# Patient Record
Sex: FEMALE | Race: WHITE | NOT HISPANIC OR LATINO | Employment: FULL TIME | URBAN - METROPOLITAN AREA
[De-identification: names, ages, dates, MRNs, and addresses within clinical notes are randomized per-mention and may not be internally consistent; named-entity substitution may affect disease eponyms.]

---

## 2017-01-02 ENCOUNTER — ANESTHESIA EVENT (OUTPATIENT)
Dept: SURGERY | Facility: HOSPITAL | Age: 52
End: 2017-01-02
Payer: MEDICAID

## 2017-01-03 ENCOUNTER — SURGERY (OUTPATIENT)
Age: 52
End: 2017-01-03

## 2017-01-03 ENCOUNTER — ANESTHESIA (OUTPATIENT)
Dept: SURGERY | Facility: HOSPITAL | Age: 52
End: 2017-01-03
Payer: MEDICAID

## 2017-01-03 PROCEDURE — 25000003 PHARM REV CODE 250

## 2017-01-03 PROCEDURE — 63600175 PHARM REV CODE 636 W HCPCS: Performed by: NURSE ANESTHETIST, CERTIFIED REGISTERED

## 2017-01-03 PROCEDURE — 63600175 PHARM REV CODE 636 W HCPCS

## 2017-01-03 PROCEDURE — 25000003 PHARM REV CODE 250: Performed by: ANESTHESIOLOGY

## 2017-01-03 PROCEDURE — D9220A PRA ANESTHESIA: Mod: ANES,,, | Performed by: ANESTHESIOLOGY

## 2017-01-03 PROCEDURE — D9220A PRA ANESTHESIA: Mod: CRNA,,, | Performed by: NURSE ANESTHETIST, CERTIFIED REGISTERED

## 2017-01-03 RX ORDER — PHENYLEPHRINE HYDROCHLORIDE 10 MG/ML
INJECTION INTRAVENOUS
Status: DISCONTINUED | OUTPATIENT
Start: 2017-01-03 | End: 2017-01-03

## 2017-01-03 RX ADMIN — MIDAZOLAM HYDROCHLORIDE 2 MG: 1 INJECTION, SOLUTION INTRAMUSCULAR; INTRAVENOUS at 02:01

## 2017-01-03 RX ADMIN — ONDANSETRON 4 MG: 2 INJECTION, SOLUTION INTRAMUSCULAR; INTRAVENOUS at 02:01

## 2017-01-03 RX ADMIN — FENTANYL CITRATE 50 MCG: 50 INJECTION INTRAMUSCULAR; INTRAVENOUS at 02:01

## 2017-01-03 RX ADMIN — IOHEXOL 50 ML: 300 INJECTION, SOLUTION INTRAVENOUS at 01:01

## 2017-01-03 RX ADMIN — LIDOCAINE HYDROCHLORIDE 50 ML: 40 SOLUTION TOPICAL at 01:01

## 2017-01-03 RX ADMIN — PHENYLEPHRINE HYDROCHLORIDE 100 MCG: 10 INJECTION INTRAVENOUS at 02:01

## 2017-01-03 RX ADMIN — PROPOFOL 170 MG: 10 INJECTION, EMULSION INTRAVENOUS at 02:01

## 2017-01-03 RX ADMIN — METOCLOPRAMIDE 10 MG: 5 INJECTION, SOLUTION INTRAMUSCULAR; INTRAVENOUS at 02:01

## 2017-01-03 RX ADMIN — LIDOCAINE HYDROCHLORIDE 100 MG: 20 INJECTION, SOLUTION INFILTRATION; PERINEURAL at 02:01

## 2017-01-03 RX ADMIN — SODIUM CHLORIDE, SODIUM LACTATE, POTASSIUM CHLORIDE, AND CALCIUM CHLORIDE: .6; .31; .03; .02 INJECTION, SOLUTION INTRAVENOUS at 02:01

## 2017-01-03 RX ADMIN — CIPROFLOXACIN 400 MG: 2 INJECTION, SOLUTION INTRAVENOUS at 02:01

## 2017-01-03 RX ADMIN — GLYCOPYRROLATE 0.2 MG: 0.2 INJECTION, SOLUTION INTRAMUSCULAR; INTRAVENOUS at 02:01

## 2017-01-03 NOTE — ANESTHESIA POSTPROCEDURE EVALUATION
"Anesthesia Post Evaluation    Patient: Lorraine Childers    Procedure(s) Performed: Procedure(s):  CYSTOSCOPY WITH HYDRODISTENSION AND BLADDER INSTILLATION  PYELOGRAM-RETROGRADE    Final Anesthesia Type: MAC  Patient location during evaluation: PACU  Patient participation: Yes- Able to Participate  Level of consciousness: awake and alert and oriented  Post-procedure vital signs: reviewed and stable  Pain management: adequate  Airway patency: patent  PONV status at discharge: No PONV  Anesthetic complications: no      Cardiovascular status: blood pressure returned to baseline and hemodynamically stable  Respiratory status: unassisted, spontaneous ventilation and room air  Hydration status: euvolemic  Follow-up not needed.        Visit Vitals    /73 (BP Location: Left arm, Patient Position: Lying, BP Method: Automatic)    Pulse 79    Temp 35.8 °C (96.4 °F) (Tympanic)    Resp 17    Ht 5' 7" (1.702 m)    Wt 91.6 kg (202 lb)    LMP 08/29/2012    SpO2 99%    Breastfeeding No    BMI 31.64 kg/m2       Pain/Leodan Score: Pain Assessment Performed: Yes (1/3/2017  2:49 PM)  Presence of Pain: non-verbal indicators absent (sedated) (1/3/2017  2:49 PM)  Pain Rating Prior to Med Admin: 0 (1/3/2017  2:49 PM)  Leodan Score: 4 (1/3/2017  2:49 PM)      "

## 2017-01-03 NOTE — TRANSFER OF CARE
"Anesthesia Transfer of Care Note    Patient: Lorraine Childers    Procedure(s) Performed: Procedure(s):  CYSTOSCOPY WITH HYDRODISTENSION AND BLADDER INSTILLATION  PYELOGRAM-RETROGRADE    Patient location: PACU    Anesthesia Type: general    Transport from OR: Transported from OR on room air with adequate spontaneous ventilation    Post pain: adequate analgesia    Post assessment: no apparent anesthetic complications and tolerated procedure well    Post vital signs: stable    Level of consciousness: sedated and responds to stimulation    Nausea/Vomiting: no nausea/vomiting    Complications: none          Last vitals:   Visit Vitals    /73 (BP Location: Left arm, Patient Position: Lying, BP Method: Automatic)    Pulse 79    Temp 35.8 °C (96.4 °F) (Tympanic)    Resp 17    Ht 5' 7" (1.702 m)    Wt 91.6 kg (202 lb)    LMP 08/29/2012    SpO2 99%    Breastfeeding No    BMI 31.64 kg/m2     "

## 2017-01-03 NOTE — ANESTHESIA PREPROCEDURE EVALUATION
01/03/2017  Lorraine Childers is a 51 y.o., female.    [facility] Anesthesia Evaluation    I have reviewed the Patient Summary Reports.     I have reviewed the Medications.     Review of Systems  Anesthesia Hx:  History of prior surgery of interest to airway management or planning:   Social:  Smoker    Cardiovascular:  Cardiovascular Normal     Renal/:  Renal/ Normal     Hepatic/GI:  Hepatic/GI Normal    Musculoskeletal:  Spine Disorders:    Endocrine:   Diabetes    Psych:   anxiety             Anesthesia Plan  Type of Anesthesia, risks & benefits discussed:  Anesthesia Type:  general  Patient's Preference:   Intra-op Monitoring Plan: standard ASA monitors  Intra-op Monitoring Plan Comments:   Post Op Pain Control Plan:   Post Op Pain Control Plan Comments:   Induction:   IV  Beta Blocker:  Patient is not currently on a Beta-Blocker (No further documentation required).       Informed Consent: Patient understands risks and agrees with Anesthesia plan.  Questions answered. Anesthesia consent signed with patient.  ASA Score: 2     Day of Surgery Review of History & Physical:    H&P update referred to the provider.         Ready For Surgery From Anesthesia Perspective.

## 2017-02-10 ENCOUNTER — OFFICE VISIT (OUTPATIENT)
Dept: FAMILY MEDICINE | Facility: CLINIC | Age: 52
End: 2017-02-10
Payer: MEDICAID

## 2017-02-10 VITALS
SYSTOLIC BLOOD PRESSURE: 118 MMHG | WEIGHT: 205.69 LBS | HEIGHT: 67 IN | TEMPERATURE: 98 F | HEART RATE: 78 BPM | OXYGEN SATURATION: 98 % | BODY MASS INDEX: 32.28 KG/M2 | DIASTOLIC BLOOD PRESSURE: 72 MMHG

## 2017-02-10 DIAGNOSIS — M25.512 ACUTE PAIN OF LEFT SHOULDER: Primary | ICD-10-CM

## 2017-02-10 DIAGNOSIS — M75.82 ROTATOR CUFF TENDONITIS, LEFT: ICD-10-CM

## 2017-02-10 PROCEDURE — 99214 OFFICE O/P EST MOD 30 MIN: CPT | Mod: S$PBB,,, | Performed by: NURSE PRACTITIONER

## 2017-02-10 PROCEDURE — 99999 PR PBB SHADOW E&M-EST. PATIENT-LVL III: CPT | Mod: PBBFAC,,, | Performed by: NURSE PRACTITIONER

## 2017-02-10 PROCEDURE — 99213 OFFICE O/P EST LOW 20 MIN: CPT | Mod: PBBFAC,PO | Performed by: NURSE PRACTITIONER

## 2017-02-10 RX ORDER — IBUPROFEN 800 MG/1
800 TABLET ORAL 3 TIMES DAILY PRN
Qty: 90 TABLET | Refills: 0 | Status: SHIPPED | OUTPATIENT
Start: 2017-02-10 | End: 2018-08-31

## 2017-02-10 RX ORDER — TIZANIDINE 4 MG/1
4 TABLET ORAL EVERY 6 HOURS PRN
Qty: 60 TABLET | Refills: 0 | Status: SHIPPED | OUTPATIENT
Start: 2017-02-10 | End: 2017-02-20

## 2017-02-10 NOTE — PROGRESS NOTES
This dictation has been generated using Dragon Dictation some phonetic errors may occur.     Lorraine Fonseca was seen today for shoulder pain.    Diagnoses and all orders for this visit:    Acute pain of left shoulder    Rotator cuff tendonitis, left    Other orders  -     ibuprofen (ADVIL,MOTRIN) 800 MG tablet; Take 1 tablet (800 mg total) by mouth 3 (three) times daily as needed for Pain.  -     tizanidine (ZANAFLEX) 4 MG tablet; Take 1 tablet (4 mg total) by mouth every 6 (six) hours as needed.      Left shoulder pain rotator cuff tendinitis treat with NSAID and muscle relaxer as above.  Consult considered steroids next.  Patient refused joint injection.    Return in about 2 weeks (around 2/24/2017).      ________________________________________________________________  ________________________________________________________________        Chief Complaint   Patient presents with    Shoulder Pain     History of present illness  This 51 y.o. presents today for complaint of left shoulder pain.  Patient notes onset of symptoms at the end of last year.  In November she had x-rays of her shoulder while she was being evaluated for the chest pain.  No abnormalities noted.  Reviewed those images.  Patient notes some progression of the shoulder pain since then.  She states she cannot raise the arm above the head.  She has pain with rotation.  She does have difficulty dressing.  Review of systems  No chest pain or shortness of breath  Patient denies a rash or bruising  No neck pain.  No cervical radiculopathy symptoms.  Past medical and social history reviewed.    Past Medical History   Diagnosis Date    Anxiety     Back problem     Diabetes mellitus type I      no meds since weight loss few years back    General anesthetics causing adverse effect in therapeutic use      sometimes hard to sedate--    Pneumonia     Thyroid disease      thyroid nodules       Past Surgical History   Procedure Laterality Date     "Hysterectomy       Partial abd     section  x2      and     Rhinoplasty tip         Family History   Problem Relation Age of Onset    Diabetes Mother     Heart disease Mother     Hyperlipidemia Mother     Hypertension Mother        Social History     Social History    Marital status: Single     Spouse name: N/A    Number of children: N/A    Years of education: N/A     Social History Main Topics    Smoking status: Current Every Day Smoker     Packs/day: 1.00     Types: Cigarettes    Smokeless tobacco: None    Alcohol use No    Drug use: No    Sexual activity: Not Asked     Other Topics Concern    None     Social History Narrative       Current Outpatient Prescriptions   Medication Sig Dispense Refill    escitalopram oxalate (LEXAPRO) 10 MG tablet Take 1 tablet (10 mg total) by mouth once daily. 30 tablet 11    lorazepam (ATIVAN) 0.5 MG tablet Take 0.5 mg by mouth every 4 (four) hours as needed for Anxiety.      methen-m.blue-s.phos-phsal-hyo 118-10-40.8-36 mg Cap Take 1 capsule by mouth 3 (three) times daily as needed (bladder pain). 20 capsule 11    oxycodone-acetaminophen (PERCOCET) 5-325 mg per tablet Take 1 tablet by mouth every 4 (four) hours as needed for Pain. 10 tablet 0    tramadol (ULTRAM) 50 mg tablet Take 50 mg by mouth every 6 (six) hours as needed for Pain.      ibuprofen (ADVIL,MOTRIN) 800 MG tablet Take 1 tablet (800 mg total) by mouth 3 (three) times daily as needed for Pain. 90 tablet 0    tizanidine (ZANAFLEX) 4 MG tablet Take 1 tablet (4 mg total) by mouth every 6 (six) hours as needed. 60 tablet 0     No current facility-administered medications for this visit.        Review of patient's allergies indicates:   Allergen Reactions    Vicodin [hydrocodone-acetaminophen] Other (See Comments)     "Makes me Hyper." Doesn't help the pain.    Percocet [oxycodone-acetaminophen] Nausea Only     Can take Percocet if she takes Phenergan or Zofran.       Physical " examination  Vitals Reviewed  Gen. Well-dressed well-nourished in mild distress due to left shoulder pain.  Skin warm dry and intact.  No rashes noted.  Chest.  Respirations are even unlabored.  Lungs are clear to auscultation.  Cardiac regular rate and rhythm.  No chest wall adenopathy noted.  Neuro. Awake alert oriented x4.  Normal judgment and cognition noted.  Extremities no clubbing cyanosis or edema noted.  Left shoulder decreased range of motion.  Patient has pain with elevation at 90°.  She is able to elevate the arm more in the vertical plane versus the horizontal plane.  She has pain with internal and external rotation and external rotation is limited.  She is able to get the back of the hand over the small of the back.  Patient states pain with doing so.  She has some palpable spasms of the deltoid supraspinatus and shoulder girdle muscles.    Call or return to clinic prn if these symptoms worsen or fail to improve as anticipated.

## 2017-02-10 NOTE — MR AVS SNAPSHOT
[facility]  [address]  [place] [address]  Phone: [phone]  Fax: [phone]                  Lorraine Childers   2/10/2017 1:20 PM   Office Visit    Description:  Female : 1965   Provider:  Jose Felix NP   Department:  [facility]           Reason for Visit     Shoulder Pain           Diagnoses this Visit        Comments    Acute pain of left shoulder    -  Primary     Rotator cuff tendonitis, left                To Do List           Goals (5 Years of Data)     None      Follow-Up and Disposition     Return in about 2 weeks (around 2017).       These Medications        Disp Refills Start End    ibuprofen (ADVIL,MOTRIN) 800 MG tablet 90 tablet 0 2/10/2017     Take 1 tablet (800 mg total) by mouth 3 (three) times daily as needed for Pain. - Oral    Pharmacy: [facility] [address] [place] [address] EXPY AT [facility] Ph #: [phone]       tizanidine (ZANAFLEX) 4 MG tablet 60 tablet 0 2/10/2017 2017    Take 1 tablet (4 mg total) by mouth every 6 (six) hours as needed. - Oral    Pharmacy: [facility] [address] [place] [address] EXP AT [facility] Ph #: [phone]         [facility] On Call     [facility] On Call Nurse Care Line -  Assistance  Registered nurses in the [facility] provide clinical advisement, health education, appointment booking, and other advisory services.  Call for this free service at [phone].             Medications           Message regarding Medications     Verify the changes and/or additions to your medication regime listed below are the same as discussed with your clinician today.  If any of these changes or additions are incorrect, please notify your healthcare provider.        START taking these NEW medications        Refills    ibuprofen (ADVIL,MOTRIN) 800 MG tablet 0    Sig: Take 1 tablet (800 mg total) by mouth 3 (three)  "times daily as needed for Pain.    Class: Normal    Route: Oral    tizanidine (ZANAFLEX) 4 MG tablet 0    Sig: Take 1 tablet (4 mg total) by mouth every 6 (six) hours as needed.    Class: Normal    Route: Oral           Verify that the below list of medications is an accurate representation of the medications you are currently taking.  If none reported, the list may be blank. If incorrect, please contact your healthcare provider. Carry this list with you in case of emergency.           Current Medications     escitalopram oxalate (LEXAPRO) 10 MG tablet Take 1 tablet (10 mg total) by mouth once daily.    lorazepam (ATIVAN) 0.5 MG tablet Take 0.5 mg by mouth every 4 (four) hours as needed for Anxiety.    methen-m.blue-s.phos-phsal-hyo 118-10-40.8-36 mg Cap Take 1 capsule by mouth 3 (three) times daily as needed (bladder pain).    oxycodone-acetaminophen (PERCOCET) 5-325 mg per tablet Take 1 tablet by mouth every 4 (four) hours as needed for Pain.    tramadol (ULTRAM) 50 mg tablet Take 50 mg by mouth every 6 (six) hours as needed for Pain.    ibuprofen (ADVIL,MOTRIN) 800 MG tablet Take 1 tablet (800 mg total) by mouth 3 (three) times daily as needed for Pain.    tizanidine (ZANAFLEX) 4 MG tablet Take 1 tablet (4 mg total) by mouth every 6 (six) hours as needed.           Clinical Reference Information           Your Vitals Were     BP Pulse Temp Height Weight Last Period    118/72 (BP Location: Right arm, Patient Position: Sitting, BP Method: Manual) 78 97.9 °F (36.6 °C) (Oral) 5' 7" (1.702 m) 93.3 kg (205 lb 11 oz) 08/29/2012    SpO2 BMI             98% 32.22 kg/m2         Blood Pressure          Most Recent Value    BP  118/72      Allergies as of 2/10/2017     Vicodin [Hydrocodone-acetaminophen]    Percocet [Oxycodone-acetaminophen]      Immunizations Administered on Date of Encounter - 2/10/2017     None      Smoking Cessation     If you would like to quit smoking:   You may be eligible for free services if you " are a [place] resident and started smoking cigarettes before September 1, 1988.  Call the Smoking Cessation [employer] (SCT) toll free at (661) 618-2647 or (768) 486-5446.   Call 1-800-QUIT-NOW if you do not meet the above criteria.            Language Assistance Services     ATTENTION: Language assistance services are available, free of charge. Please call [phone].      ATENCIÓN: Si habla español, tiene a granados disposición servicios gratuitos de asistencia lingüística. Llame al [phone].     CHÚ Ý: N?u b?n nói Ti?ng Vi?t, có các d?ch v? h? tr? ngôn ng? mi?n phí dành cho b?n. G?i s? [phone].         [facility] complies with applicable Federal civil rights laws and does not discriminate on the basis of race, color, national origin, age, disability, or sex.

## 2017-04-10 ENCOUNTER — OFFICE VISIT (OUTPATIENT)
Dept: FAMILY MEDICINE | Facility: CLINIC | Age: 52
End: 2017-04-10
Payer: MEDICAID

## 2017-04-10 ENCOUNTER — HOSPITAL ENCOUNTER (OUTPATIENT)
Dept: RADIOLOGY | Facility: HOSPITAL | Age: 52
Discharge: HOME | End: 2017-04-10
Attending: NURSE PRACTITIONER
Payer: MEDICAID

## 2017-04-10 VITALS
TEMPERATURE: 98 F | HEIGHT: 67 IN | BODY MASS INDEX: 31.11 KG/M2 | OXYGEN SATURATION: 97 % | WEIGHT: 198.19 LBS | HEART RATE: 82 BPM | DIASTOLIC BLOOD PRESSURE: 78 MMHG | SYSTOLIC BLOOD PRESSURE: 108 MMHG

## 2017-04-10 DIAGNOSIS — M54.2 NECK PAIN: ICD-10-CM

## 2017-04-10 DIAGNOSIS — M77.8 TENDINITIS OF SHOULDER, LEFT: ICD-10-CM

## 2017-04-10 DIAGNOSIS — R39.9 UTI SYMPTOMS: ICD-10-CM

## 2017-04-10 DIAGNOSIS — J18.9 PNEUMONIA DUE TO INFECTIOUS ORGANISM, UNSPECIFIED LATERALITY, UNSPECIFIED PART OF LUNG: ICD-10-CM

## 2017-04-10 DIAGNOSIS — M54.2 NECK PAIN: Primary | ICD-10-CM

## 2017-04-10 DIAGNOSIS — M25.512 ACUTE PAIN OF LEFT SHOULDER: ICD-10-CM

## 2017-04-10 LAB
BILIRUB SERPL-MCNC: NEGATIVE MG/DL
BLOOD URINE, POC: NEGATIVE
COLOR, POC UA: YELLOW
GLUCOSE UR QL STRIP: NEGATIVE
KETONES UR QL STRIP: NEGATIVE
LEUKOCYTE ESTERASE URINE, POC: NEGATIVE
NITRITE, POC UA: NEGATIVE
PH, POC UA: 6
PROTEIN, POC: NEGATIVE
SPECIFIC GRAVITY, POC UA: 1.01
UROBILINOGEN, POC UA: NEGATIVE

## 2017-04-10 PROCEDURE — 99999 PR PBB SHADOW E&M-EST. PATIENT-LVL IV: CPT | Mod: PBBFAC,,, | Performed by: NURSE PRACTITIONER

## 2017-04-10 PROCEDURE — 72040 X-RAY EXAM NECK SPINE 2-3 VW: CPT | Mod: TC,PO

## 2017-04-10 PROCEDURE — 71020 XR CHEST PA AND LATERAL: CPT | Mod: TC,PO

## 2017-04-10 PROCEDURE — 99215 OFFICE O/P EST HI 40 MIN: CPT | Mod: S$PBB,,, | Performed by: NURSE PRACTITIONER

## 2017-04-10 PROCEDURE — 72040 X-RAY EXAM NECK SPINE 2-3 VW: CPT | Mod: 26,,, | Performed by: RADIOLOGY

## 2017-04-10 PROCEDURE — 71020 XR CHEST PA AND LATERAL: CPT | Mod: 26,,, | Performed by: RADIOLOGY

## 2017-04-10 RX ORDER — AMOXICILLIN AND CLAVULANATE POTASSIUM 875; 125 MG/1; MG/1
1 TABLET, FILM COATED ORAL EVERY 12 HOURS
Qty: 20 TABLET | Refills: 0 | Status: SHIPPED | OUTPATIENT
Start: 2017-04-10 | End: 2017-04-20

## 2017-04-10 RX ORDER — BUPROPION HYDROCHLORIDE 150 MG/1
150 TABLET ORAL EVERY MORNING
Refills: 3 | COMMUNITY
Start: 2017-03-09 | End: 2017-05-16 | Stop reason: SDUPTHER

## 2017-04-10 RX ORDER — PREDNISONE 20 MG/1
20 TABLET ORAL 2 TIMES DAILY
Qty: 10 TABLET | Refills: 0 | Status: SHIPPED | OUTPATIENT
Start: 2017-04-10 | End: 2017-04-15

## 2017-04-10 RX ORDER — HYDROCODONE BITARTRATE AND ACETAMINOPHEN 7.5; 325 MG/1; MG/1
1 TABLET ORAL EVERY 6 HOURS PRN
Qty: 60 TABLET | Refills: 0 | Status: SHIPPED | OUTPATIENT
Start: 2017-04-10 | End: 2017-05-16

## 2017-04-10 RX ORDER — PROMETHAZINE HYDROCHLORIDE 25 MG/1
25 TABLET ORAL EVERY 6 HOURS PRN
Qty: 60 TABLET | Refills: 0 | Status: SHIPPED | OUTPATIENT
Start: 2017-04-10 | End: 2018-04-19

## 2017-04-10 NOTE — PROGRESS NOTES
This dictation has been generated using Dragon Dictation some phonetic errors may occur.     Lorraine Fonseca was seen today for arm pain, cough, nasal congestion, flank pain, back pain and urinary tract infection.    Diagnoses and all orders for this visit:    Neck pain  -     X-Ray Cervical Spine AP And Lateral; Future  -     Ambulatory Referral to Physical/Occupational Therapy    Acute pain of left shoulder  -     Ambulatory Referral to Physical/Occupational Therapy    Pneumonia due to infectious organism, unspecified laterality, unspecified part of lung  -     X-Ray Chest PA And Lateral; Future    UTI symptoms  -     POCT urine dipstick without microscope    Tendinitis of shoulder, left  -     Ambulatory Referral to Physical/Occupational Therapy    Other orders  -     amoxicillin-clavulanate 875-125mg (AUGMENTIN) 875-125 mg per tablet; Take 1 tablet by mouth every 12 (twelve) hours.  -     predniSONE (DELTASONE) 20 MG tablet; Take 1 tablet (20 mg total) by mouth 2 (two) times daily.  -     hydrocodone-acetaminophen 7.5-325mg (NORCO) 7.5-325 mg per tablet; Take 1 tablet by mouth every 6 (six) hours as needed for Pain.  -     promethazine (PHENERGAN) 25 MG tablet; Take 1 tablet (25 mg total) by mouth every 6 (six) hours as needed for Nausea.      In excessive of 45 minutes spent with patient with greater than 50% of time dedicated to education on symptoms, diagnosis, treatments, and coordination of care.     Neck pain likely arthritic.  Check x-ray as above.  Old x-ray from prior practice did note fracture.  Left shoulder pain calcified tendinitis noted on prior x-ray.  Pain med and prednisone as above for orthopedic issues.  Consider PT. Order placed.    Productive cough check chest x-ray rule out pneumonia.  Also with back pain and recommended updating mammogram.  Needs to follow-up with gynecologist Dr. Jensen.  Last reported mammography 3 years ago.  Record not available for my review.  UTI symptoms point of  service urinalysis no UTI per my interpretation.  Neck shoulder and back pain referred to PT.    Return if symptoms worsen or fail to improve.    Colonoscopy up to date per pt.   ________________________________________________________________  ________________________________________________________________        Chief Complaint   Patient presents with    Arm Pain     left arm    Cough    Nasal Congestion    Flank Pain     right side    Back Pain    Urinary Tract Infection     History of present illness  This 51 y.o. presents today for complaint of multiple complaints.  Patient notes cough and cold symptoms.  She is coughing up green for about 2 weeks.  She's had some shortness of breath with coughing.  She denies any chest pain.  Unfortunately still smoking some.  Left shoulder pain.  This has been an ongoing issue.  We reviewed the old x-ray report.  She does have some calcified tendinitis in the left shoulder.  She denies any new injury.  Patient notes neck pain.  Remote injury of the neck.  Recent flare of pain.  Depression is probably also contributing to pain symptoms.  We will update her x-ray images as prior images are not available for my review.  Depression.  Patient notes loss of job.  Job stress.  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  No suicidal or violent ideation.  Caring for elementary age daughter.  Also helping her young adult daughter and grandchild.  Also with elderly mother who has diabetes and chronic medical disease.  Review of systems  No fever or chills.  No facial pain or pressure.  No sore throat.  Patient denies gland swelling  No nausea vomiting or diarrhea  Patient denies chest pain.  She does note thoracic back pain.  Neck and shoulder pain.  Patient denies a rash    Past medical and social history reviewed.    Past Medical History:   Diagnosis Date    Anxiety     Back problem     Diabetes mellitus type I     no meds since weight loss few  years back    General anesthetics causing adverse effect in therapeutic use     sometimes hard to sedate--    Pneumonia     Thyroid disease     thyroid nodules       Past Surgical History:   Procedure Laterality Date     SECTION  x2     and     HYSTERECTOMY      Partial abd    RHINOPLASTY TIP         Family History   Problem Relation Age of Onset    Diabetes Mother     Heart disease Mother     Hyperlipidemia Mother     Hypertension Mother        Social History     Social History    Marital status: Single     Spouse name: N/A    Number of children: N/A    Years of education: N/A     Social History Main Topics    Smoking status: Current Every Day Smoker     Packs/day: 1.00     Types: Cigarettes    Smokeless tobacco: None    Alcohol use No    Drug use: No    Sexual activity: Not Asked     Other Topics Concern    None     Social History Narrative       Current Outpatient Prescriptions   Medication Sig Dispense Refill    buPROPion (WELLBUTRIN XL) 150 MG TB24 tablet Take 150 mg by mouth every morning.  3    ibuprofen (ADVIL,MOTRIN) 800 MG tablet Take 1 tablet (800 mg total) by mouth 3 (three) times daily as needed for Pain. 90 tablet 0    lorazepam (ATIVAN) 0.5 MG tablet Take 0.5 mg by mouth every 4 (four) hours as needed for Anxiety.      methen-m.blue-s.phos-phsal-hyo 118-10-40.8-36 mg Cap Take 1 capsule by mouth 3 (three) times daily as needed (bladder pain). 20 capsule 11    amoxicillin-clavulanate 875-125mg (AUGMENTIN) 875-125 mg per tablet Take 1 tablet by mouth every 12 (twelve) hours. 20 tablet 0    hydrocodone-acetaminophen 7.5-325mg (NORCO) 7.5-325 mg per tablet Take 1 tablet by mouth every 6 (six) hours as needed for Pain. 60 tablet 0    predniSONE (DELTASONE) 20 MG tablet Take 1 tablet (20 mg total) by mouth 2 (two) times daily. 10 tablet 0    promethazine (PHENERGAN) 25 MG tablet Take 1 tablet (25 mg total) by mouth every 6 (six) hours as needed for Nausea. 60 tablet  "0     No current facility-administered medications for this visit.        Review of patient's allergies indicates:   Allergen Reactions    Vicodin [hydrocodone-acetaminophen] Other (See Comments)     "Makes me Hyper." Doesn't help the pain.    Percocet [oxycodone-acetaminophen] Nausea Only     Can take Percocet if she takes Phenergan or Zofran.       Physical examination  Vitals Reviewed  Gen. Well-dressed well-nourished no apparent distress  Skin warm dry and intact.  No rashes noted.  HEENT.  TM intact bilateral with normal light reflex.  No mastoid tenderness during percussion.  Nares patent bilateral.  Pharynx is unremarkable.  No maxillary or frontal sinus tenderness when percussed.    Neck is supple without adenopathy  Chest.  Respirations are even unlabored.  Lungs are clear to auscultation.  Cardiac regular rate and rhythm.  No chest wall adenopathy noted.  Abdomen is soft and not distended.  Bowel sounds are present.  No tenderness during palpation of the abdomen.  No Hepatosplenomegaly noted.  No hernia noted.  No CVA tenderness to percussion.    Neuro. Awake alert oriented x4.  Normal judgment and cognition noted.  Extremities no clubbing cyanosis or edema noted. Palpable spasm cervical and thoracic spine.  Palpable spasm left shoulder deltoid and triceps muscle.     Call or return to clinic prn if these symptoms worsen or fail to improve as anticipated.    "

## 2017-04-10 NOTE — MR AVS SNAPSHOT
[facility]  [address]  [place] [address]  Phone: [phone]  Fax: [phone]                  Lorraine Childers   4/10/2017 2:40 PM   Office Visit    Description:  Female : 1965   Provider:  Jose Felix NP   Department:  [facility]           Reason for Visit     Arm Pain     Cough     Nasal Congestion     Flank Pain     Back Pain     Urinary Tract Infection           Diagnoses this Visit        Comments    Neck pain    -  Primary     Acute pain of left shoulder         Pneumonia due to infectious organism, unspecified laterality, unspecified part of lung                To Do List           Goals (5 Years of Data)     None      Follow-Up and Disposition     Return if symptoms worsen or fail to improve.       These Medications        Disp Refills Start End    amoxicillin-clavulanate 875-125mg (AUGMENTIN) 875-125 mg per tablet 20 tablet 0 4/10/2017 2017    Take 1 tablet by mouth every 12 (twelve) hours. - Oral    Pharmacy: [facility] [address] [facility] Ph #: [phone]       predniSONE (DELTASONE) 20 MG tablet 10 tablet 0 4/10/2017 4/15/2017    Take 1 tablet (20 mg total) by mouth 2 (two) times daily. - Oral    Pharmacy: [facility] [address] [place] [facility] Ph #: [phone]       hydrocodone-acetaminophen 7.5-325mg (NORCO) 7.5-325 mg per tablet 60 tablet 0 4/10/2017     Take 1 tablet by mouth every 6 (six) hours as needed for Pain. - Oral    Pharmacy: [facility] [address] [place] [facility] Ph #: [phone]       promethazine (PHENERGAN) 25 MG tablet 60 tablet 0 4/10/2017     Take 1 tablet (25 mg total) by mouth every 6 (six) hours as needed for Nausea. - Oral    Pharmacy: [facility] [address] [facility] Ph  #: [phone]         [facility]     [facility] On Call Nurse Care Line - 24/7 Assistance  Unless otherwise directed by your provider, please contact [facility], our nurse care line that is available for 24/7 assistance.     Registered nurses in the [facility] provide: appointment scheduling, clinical advisement, health education, and other advisory services.  Call: [phone] (toll free)               Medications           Message regarding Medications     Verify the changes and/or additions to your medication regime listed below are the same as discussed with your clinician today.  If any of these changes or additions are incorrect, please notify your healthcare provider.        START taking these NEW medications        Refills    amoxicillin-clavulanate 875-125mg (AUGMENTIN) 875-125 mg per tablet 0    Sig: Take 1 tablet by mouth every 12 (twelve) hours.    Class: Normal    Route: Oral    predniSONE (DELTASONE) 20 MG tablet 0    Sig: Take 1 tablet (20 mg total) by mouth 2 (two) times daily.    Class: Normal    Route: Oral    hydrocodone-acetaminophen 7.5-325mg (NORCO) 7.5-325 mg per tablet 0    Sig: Take 1 tablet by mouth every 6 (six) hours as needed for Pain.    Class: Print    Route: Oral    promethazine (PHENERGAN) 25 MG tablet 0    Sig: Take 1 tablet (25 mg total) by mouth every 6 (six) hours as needed for Nausea.    Class: Normal    Route: Oral      STOP taking these medications     escitalopram oxalate (LEXAPRO) 10 MG tablet Take 1 tablet (10 mg total) by mouth once daily.    tramadol (ULTRAM) 50 mg tablet Take 50 mg by mouth every 6 (six) hours as needed for Pain.    oxycodone-acetaminophen (PERCOCET) 5-325 mg per tablet Take 1 tablet by mouth every 4 (four) hours as needed for Pain.           Verify that the below list of medications is an accurate representation of the medications you are currently taking.  If none reported, the list may be blank. If incorrect, please contact  "your healthcare provider. Carry this list with you in case of emergency.           Current Medications     buPROPion (WELLBUTRIN XL) 150 MG TB24 tablet Take 150 mg by mouth every morning.    ibuprofen (ADVIL,MOTRIN) 800 MG tablet Take 1 tablet (800 mg total) by mouth 3 (three) times daily as needed for Pain.    lorazepam (ATIVAN) 0.5 MG tablet Take 0.5 mg by mouth every 4 (four) hours as needed for Anxiety.    methen-mJuwanblue-s.phos-phsal-hyo 118-10-40.8-36 mg Cap Take 1 capsule by mouth 3 (three) times daily as needed (bladder pain).    amoxicillin-clavulanate 875-125mg (AUGMENTIN) 875-125 mg per tablet Take 1 tablet by mouth every 12 (twelve) hours.    hydrocodone-acetaminophen 7.5-325mg (NORCO) 7.5-325 mg per tablet Take 1 tablet by mouth every 6 (six) hours as needed for Pain.    predniSONE (DELTASONE) 20 MG tablet Take 1 tablet (20 mg total) by mouth 2 (two) times daily.    promethazine (PHENERGAN) 25 MG tablet Take 1 tablet (25 mg total) by mouth every 6 (six) hours as needed for Nausea.           Clinical Reference Information           Your Vitals Were     BP Pulse Temp Height Weight Last Period    108/78 (BP Location: Right arm, Patient Position: Sitting, BP Method: Manual) 82 97.9 °F (36.6 °C) (Oral) 5' 7" (1.702 m) 89.9 kg (198 lb 3.1 oz) 08/29/2012    SpO2 BMI             97% 31.04 kg/m2         Blood Pressure          Most Recent Value    BP  108/78      Allergies as of 4/10/2017     Vicodin [Hydrocodone-acetaminophen]    Percocet [Oxycodone-acetaminophen]      Immunizations Administered on Date of Encounter - 4/10/2017     None      Orders Placed During Today's Visit     Future Labs/Procedures Expected by Expires    X-Ray Cervical Spine AP And Lateral  4/10/2017 4/10/2018    X-Ray Chest PA And Lateral  4/10/2017 4/10/2018      Smoking Cessation     If you would like to quit smoking:   You may be eligible for free services if you are a [place] resident and started smoking cigarettes before September 1, " 1988.  Call the Smoking Cessation Trust ([employer]) toll free at (687) 516-4258 or (960) 625-6987.   Call 1-800-QUIT-NOW if you do not meet the above criteria.   Contact us via email: [email or web address]   View our website for more information: [email or web address]        Language Assistance Services     ATTENTION: Language assistance services are available, free of charge. Please call [phone].      ATENCIÓN: Si amarilis woody, tiene a granados disposición servicios gratuitos de asistencia lingüística. Llame al [phone].     CHÚ Ý: N?u b?n nói Ti?ng Vi?t, có các d?ch v? h? tr? ngôn ng? mi?n phí dành cho b?n. G?i s? [phone].         [facility] complies with applicable Federal civil rights laws and does not discriminate on the basis of race, color, national origin, age, disability, or sex.

## 2017-04-11 ENCOUNTER — TELEPHONE (OUTPATIENT)
Dept: FAMILY MEDICINE | Facility: CLINIC | Age: 52
End: 2017-04-11

## 2017-04-11 DIAGNOSIS — M54.12 CERVICAL RADICULOPATHY AT C6: Primary | ICD-10-CM

## 2017-04-11 NOTE — TELEPHONE ENCOUNTER
I contacted the pt about her neck xray. i am ordering an MRI of the neck. She will be expecting your call.

## 2017-05-10 RX ORDER — LORAZEPAM 0.5 MG/1
TABLET ORAL
Qty: 60 TABLET | Refills: 0 | Status: SHIPPED | OUTPATIENT
Start: 2017-05-10 | End: 2017-05-16 | Stop reason: SDUPTHER

## 2017-05-16 ENCOUNTER — OFFICE VISIT (OUTPATIENT)
Dept: FAMILY MEDICINE | Facility: CLINIC | Age: 52
End: 2017-05-16
Payer: MEDICAID

## 2017-05-16 ENCOUNTER — LAB VISIT (OUTPATIENT)
Dept: LAB | Facility: HOSPITAL | Age: 52
End: 2017-05-16
Payer: MEDICAID

## 2017-05-16 VITALS
TEMPERATURE: 98 F | HEIGHT: 67 IN | WEIGHT: 196.44 LBS | SYSTOLIC BLOOD PRESSURE: 110 MMHG | BODY MASS INDEX: 30.83 KG/M2 | OXYGEN SATURATION: 99 % | DIASTOLIC BLOOD PRESSURE: 70 MMHG | HEART RATE: 100 BPM

## 2017-05-16 DIAGNOSIS — R10.11 RUQ ABDOMINAL PAIN: ICD-10-CM

## 2017-05-16 DIAGNOSIS — R53.83 FATIGUE, UNSPECIFIED TYPE: Primary | ICD-10-CM

## 2017-05-16 DIAGNOSIS — Z80.8 FAMILY HISTORY OF THYROID CANCER: ICD-10-CM

## 2017-05-16 DIAGNOSIS — R53.83 FATIGUE, UNSPECIFIED TYPE: ICD-10-CM

## 2017-05-16 DIAGNOSIS — E04.1 THYROID NODULE: ICD-10-CM

## 2017-05-16 LAB
25(OH)D3+25(OH)D2 SERPL-MCNC: 28 NG/ML
TSH SERPL DL<=0.005 MIU/L-ACNC: 1.28 UIU/ML
VIT B12 SERPL-MCNC: 684 PG/ML

## 2017-05-16 PROCEDURE — 84443 ASSAY THYROID STIM HORMONE: CPT

## 2017-05-16 PROCEDURE — 99999 PR PBB SHADOW E&M-EST. PATIENT-LVL III: CPT | Mod: PBBFAC,,, | Performed by: NURSE PRACTITIONER

## 2017-05-16 PROCEDURE — 82607 VITAMIN B-12: CPT

## 2017-05-16 PROCEDURE — 82306 VITAMIN D 25 HYDROXY: CPT

## 2017-05-16 PROCEDURE — 36415 COLL VENOUS BLD VENIPUNCTURE: CPT | Mod: PO

## 2017-05-16 PROCEDURE — 99214 OFFICE O/P EST MOD 30 MIN: CPT | Mod: S$PBB,,, | Performed by: NURSE PRACTITIONER

## 2017-05-16 RX ORDER — LORAZEPAM 0.5 MG/1
0.5 TABLET ORAL EVERY 6 HOURS PRN
Qty: 60 TABLET | Refills: 3 | Status: SHIPPED | OUTPATIENT
Start: 2017-05-16 | End: 2017-10-03 | Stop reason: SDUPTHER

## 2017-05-16 RX ORDER — BUPROPION HYDROCHLORIDE 150 MG/1
150 TABLET ORAL EVERY MORNING
Qty: 30 TABLET | Refills: 3 | Status: SHIPPED | OUTPATIENT
Start: 2017-05-16 | End: 2018-04-19

## 2017-05-16 NOTE — PROGRESS NOTES
This dictation has been generated using Dragon Dictation some phonetic errors may occur.     Lorraine Fonseca was seen today for abdominal pain, shoulder pain and urinary tract infection.    Diagnoses and all orders for this visit:    Fatigue, unspecified type  -     Vitamin B12; Future  -     Vitamin D; Future  -     TSH; Future    Family history of thyroid cancer  -     US Soft Tissue Head Neck Thyroid; Future    Thyroid nodule  -     US Soft Tissue Head Neck Thyroid; Future    RUQ abdominal pain  -     Urinalysis; Future  -     Urine culture; Future    Other orders  -     buPROPion (WELLBUTRIN XL) 150 MG TB24 tablet; Take 1 tablet (150 mg total) by mouth every morning.  -     lorazepam (ATIVAN) 0.5 MG tablet; Take 1 tablet (0.5 mg total) by mouth every 6 (six) hours as needed.      Fatigue check vitamin levels thyroid as above.  History of thyroid nodules repeat ultrasound.  Patient requests referral for specialist FNA.    Right upper quadrant abdominal pain.  Check urinalysis urine culture rule out infection.  Consider gastritis.  Also consider anxiety/depression contributing to pain symptoms and abdominal pain.  Refill Wellbutrin.  Refill lorazepam.  MRI of neck needs reorder. Pt unable to keep apt due to family issues. Reviewed images DDD of cervical spine noted.  Back pain: I had ordered xray of the lumbar spine 7/2016 but that was not completed. Continues with back pain for more that a year now.     Return in about 1 week (around 5/23/2017).      ________________________________________________________________  ________________________________________________________________        Chief Complaint   Patient presents with    Abdominal Pain    Shoulder Pain    Urinary Tract Infection     History of present illness  This 51 y.o. presents today for complaint of fatigue, right upper quadrant abdominal pain, and shoulder pain.  Patient is concerned she might have UTI.  She also thinks stress might be adding to  her upper abdominal complaints.  Patient does note decreased appetite.  Symptoms are not exacerbated by eating.  Occasional nausea.  No vomiting or diarrhea.  No stool changes.  Patient has ongoing chronic back pain unclear etiology(previously ordered xrays).  Also with shoulder pain similar to previous visit(tendonitis).  No history of injury.  Refuses pain med.  Continues with neck pain DDD noted on xray. Pt complains of radiculopathy as previously noted.  Pt unable to complete mri as previously ordered family issue.  ROS:   CONST: weight stable.  EYES: no vision change.  ENT: No sore throat, headache, or earache.  CV: no chest pain w/ exertion.  RESP: No shortness of breath. No BRIONES. No cough. GI: no nausea, vomiting, diarrhea. No dysphagia.   : no urinary issues.  No vaginal issues. RLQ pain needs to follow up with Dr. Jensen.   MUSCULOSKELETAL: no new myalgias or arthralgias. Ongoing left shoulder pain. SKIN: no new changes.  NEURO: no focal deficits. Denies headache.  PSYCH: no new issues.  ENDOCRINE: no polyuria.  HEME: no lymph nodes.  ALLERGY: no general pruritis.      Reviewed histories.     Past Medical History:   Diagnosis Date    Anxiety     Back problem     Diabetes mellitus type I     no meds since weight loss few years back    General anesthetics causing adverse effect in therapeutic use     sometimes hard to sedate--    Pneumonia     Thyroid disease     thyroid nodules       Past Surgical History:   Procedure Laterality Date     SECTION  x2     and     HYSTERECTOMY      Partial abd    RHINOPLASTY TIP         Family History   Problem Relation Age of Onset    Diabetes Mother     Heart disease Mother     Hyperlipidemia Mother     Hypertension Mother        Social History     Social History    Marital status: Single     Spouse name: N/A    Number of children: N/A    Years of education: N/A     Social History Main Topics    Smoking status: Current Every Day Smoker      "Packs/day: 1.00     Types: Cigarettes    Smokeless tobacco: None    Alcohol use No    Drug use: No    Sexual activity: Not Asked     Other Topics Concern    None     Social History Narrative       Current Outpatient Prescriptions   Medication Sig Dispense Refill    buPROPion (WELLBUTRIN XL) 150 MG TB24 tablet Take 1 tablet (150 mg total) by mouth every morning. 30 tablet 3    ibuprofen (ADVIL,MOTRIN) 800 MG tablet Take 1 tablet (800 mg total) by mouth 3 (three) times daily as needed for Pain. 90 tablet 0    lorazepam (ATIVAN) 0.5 MG tablet Take 1 tablet (0.5 mg total) by mouth every 6 (six) hours as needed. 60 tablet 3    promethazine (PHENERGAN) 25 MG tablet Take 1 tablet (25 mg total) by mouth every 6 (six) hours as needed for Nausea. 60 tablet 0     No current facility-administered medications for this visit.        Review of patient's allergies indicates:   Allergen Reactions    Vicodin [hydrocodone-acetaminophen] Other (See Comments)     "Makes me Hyper." Doesn't help the pain.    Percocet [oxycodone-acetaminophen] Nausea Only     Can take Percocet if she takes Phenergan or Zofran.       Physical examination  Vitals Reviewed  Gen. Well-dressed well-nourished no apparent distress  Skin warm dry and intact.  No rashes noted.  Neck is supple without adenopathy  Chest.  Respirations are even unlabored.  Lungs are clear to auscultation.  Cardiac regular rate and rhythm.  No chest wall adenopathy noted.  Abdomen is soft and not distended.  Bowel sounds are present.  Minimal tenderness during palpation of the RUQ abdomen.  Tender with palpation of RLQ.  Negative Jacob sign.  No Hepatosplenomegaly noted.  No hernia noted.  No CVA tenderness to percussion.   Neuro. Awake alert oriented x4.  Normal judgment and cognition noted.  Extremities no clubbing cyanosis or edema noted. Neck and back assessment unchanged. Crepitus in shoulder.     Call or return to clinic prn if these symptoms worsen or fail to improve " as anticipated.

## 2017-05-16 NOTE — MR AVS SNAPSHOT
[facility]  [address]  [place] [address]  Phone: [phone]  Fax: [phone]                  Lorraine Childers   2017 1:00 PM   Office Visit    Description:  Female : 1965   Provider:  Jose Felix NP   Department:  [facility]           Reason for Visit     Abdominal Pain     Shoulder Pain     Urinary Tract Infection           Diagnoses this Visit        Comments    Fatigue, unspecified type    -  Primary     Family history of thyroid cancer         Thyroid nodule                To Do List           Goals (5 Years of Data)     None      Follow-Up and Disposition     Return in about 1 week (around 2017).       These Medications        Disp Refills Start End    buPROPion (WELLBUTRIN XL) 150 MG TB24 tablet 30 tablet 3 2017     Take 1 tablet (150 mg total) by mouth every morning. - Oral    Pharmacy: [facility] [place] [address] [address] [facility] Ph #: [phone]       lorazepam (ATIVAN) 0.5 MG tablet 60 tablet 3 2017     Take 1 tablet (0.5 mg total) by mouth every 6 (six) hours as needed. - Oral    [address] [facility] [address] [facility] Ph #: [phone]         [facility]     [facility] Nurse Care Line - 24 Assistance  Unless otherwise directed by your provider, please contact [facility], our nurse care line that is available for / assistance.     Registered nurses in the [facility] provide: appointment scheduling, clinical advisement, health education, and other advisory services.  Call: [phone] (toll free)               Medications           Message regarding Medications     Verify the changes and/or additions to your medication regime listed below are the same as discussed with your clinician today.  If any of these changes or additions are incorrect, please notify your healthcare provider.        CHANGE how you are taking  "these medications     Start Taking Instead of    buPROPion (WELLBUTRIN XL) 150 MG TB24 tablet buPROPion (WELLBUTRIN XL) 150 MG TB24 tablet    Dosage:  Take 1 tablet (150 mg total) by mouth every morning. Dosage:  Take 150 mg by mouth every morning.    Reason for Change:  Reorder     lorazepam (ATIVAN) 0.5 MG tablet lorazepam (ATIVAN) 0.5 MG tablet    Dosage:  Take 1 tablet (0.5 mg total) by mouth every 6 (six) hours as needed. Dosage:  TAKE 1 TABLET BY MOUTH EVERY 6 HOURS AS NEEDED    Reason for Change:  Reorder       STOP taking these medications     hydrocodone-acetaminophen 7.5-325mg (NORCO) 7.5-325 mg per tablet Take 1 tablet by mouth every 6 (six) hours as needed for Pain.    methen-m.blue-s.phos-phsal-hyo 118-10-40.8-36 mg Cap Take 1 capsule by mouth 3 (three) times daily as needed (bladder pain).           Verify that the below list of medications is an accurate representation of the medications you are currently taking.  If none reported, the list may be blank. If incorrect, please contact your healthcare provider. Carry this list with you in case of emergency.           Current Medications     buPROPion (WELLBUTRIN XL) 150 MG TB24 tablet Take 1 tablet (150 mg total) by mouth every morning.    ibuprofen (ADVIL,MOTRIN) 800 MG tablet Take 1 tablet (800 mg total) by mouth 3 (three) times daily as needed for Pain.    lorazepam (ATIVAN) 0.5 MG tablet Take 1 tablet (0.5 mg total) by mouth every 6 (six) hours as needed.    promethazine (PHENERGAN) 25 MG tablet Take 1 tablet (25 mg total) by mouth every 6 (six) hours as needed for Nausea.           Clinical Reference Information           Your Vitals Were     BP Pulse Temp Height Weight Last Period    110/70 (BP Location: Right arm, Patient Position: Sitting, BP Method: Manual) 100 97.9 °F (36.6 °C) 5' 7" (1.702 m) 89.1 kg (196 lb 6.9 oz) 08/29/2012    SpO2 BMI             99% 30.77 kg/m2         Blood Pressure          Most Recent Value    BP  110/70      Allergies " as of 5/16/2017     Vicodin [Hydrocodone-acetaminophen]    Percocet [Oxycodone-acetaminophen]      Immunizations Administered on Date of Encounter - 5/16/2017     None      Orders Placed During Today's Visit     Future Labs/Procedures Expected by Expires    TSH  5/16/2017 8/14/2017    US Soft Tissue Head Neck Thyroid  5/16/2017 5/16/2018    Vitamin B12  5/16/2017 7/15/2018    Vitamin D  5/16/2017 7/15/2018      Smoking Cessation     If you would like to quit smoking:   You may be eligible for free services if you are a [place] resident and started smoking cigarettes before September 1, 1988.  Call the Smoking Cessation Trust ([facility]) toll free at ([phone] or (846) 589-3843.   Call 1-800-QUIT-NOW if you do not meet the above criteria.   Contact us via email: [email or web address]   View our website for more information: [email or web address]        Language Assistance Services     ATTENTION: Language assistance services are available, free of charge. Please call [phone].      ATENCIÓN: Si habla español, tiene a granados disposición servicios gratuitos de asistencia lingüística. Llame al [phone].     CHÚ Ý: N?u b?n nói Ti?ng Vi?t, có các d?ch v? h? tr? ngôn ng? mi?n phí dành cho b?n. G?i s? [phone].         [facility] complies with applicable Federal civil rights laws and does not discriminate on the basis of race, color, national origin, age, disability, or sex.

## 2017-05-17 ENCOUNTER — TELEPHONE (OUTPATIENT)
Dept: FAMILY MEDICINE | Facility: CLINIC | Age: 52
End: 2017-05-17

## 2017-05-17 ENCOUNTER — PATIENT MESSAGE (OUTPATIENT)
Dept: FAMILY MEDICINE | Facility: CLINIC | Age: 52
End: 2017-05-17

## 2017-05-17 DIAGNOSIS — M54.30 BACK PAIN WITH SCIATICA: Primary | ICD-10-CM

## 2017-05-17 DIAGNOSIS — M54.9 BACK PAIN WITH SCIATICA: Primary | ICD-10-CM

## 2017-05-17 RX ORDER — ERGOCALCIFEROL 1.25 MG/1
50000 CAPSULE ORAL
Qty: 4 CAPSULE | Refills: 11 | Status: SHIPPED | OUTPATIENT
Start: 2017-05-17 | End: 2017-06-16

## 2017-05-17 NOTE — TELEPHONE ENCOUNTER
Call pt. I reviewed her xrays. We do not have a low back xray. I need to order that before doing any MRIs. I have placed the order please arrange a time for the pt to do xray.

## 2017-05-18 ENCOUNTER — TELEPHONE (OUTPATIENT)
Dept: FAMILY MEDICINE | Facility: CLINIC | Age: 52
End: 2017-05-18

## 2017-05-18 ENCOUNTER — HOSPITAL ENCOUNTER (OUTPATIENT)
Dept: RADIOLOGY | Facility: HOSPITAL | Age: 52
Discharge: HOME | End: 2017-05-18
Attending: NURSE PRACTITIONER
Payer: MEDICAID

## 2017-05-18 DIAGNOSIS — M54.12 CERVICAL RADICULOPATHY: Primary | ICD-10-CM

## 2017-05-18 DIAGNOSIS — M54.9 BACK PAIN WITH SCIATICA: ICD-10-CM

## 2017-05-18 DIAGNOSIS — M54.16 LUMBAR RADICULOPATHY: ICD-10-CM

## 2017-05-18 DIAGNOSIS — M54.30 BACK PAIN WITH SCIATICA: ICD-10-CM

## 2017-05-18 PROCEDURE — 72100 X-RAY EXAM L-S SPINE 2/3 VWS: CPT | Mod: 26,,, | Performed by: RADIOLOGY

## 2017-05-18 PROCEDURE — 72100 X-RAY EXAM L-S SPINE 2/3 VWS: CPT | Mod: TC,PO

## 2017-05-18 NOTE — TELEPHONE ENCOUNTER
Call pt  She has arthritis changes in her back which are not that severe. Her disc space looks good. With the pain the legs we will do the MRI of the back.  Also MRI of neck as discussed at visit.

## 2017-05-18 NOTE — TELEPHONE ENCOUNTER
Urine testing is normal so far. We will have final results tomorrow or the weekend.   I am going to refer her to urology. She needs to follow up with Dr. Hamilton

## 2017-05-18 NOTE — TELEPHONE ENCOUNTER
Patient returned call about results. He verbally understood.about her test results. She stated she she is still burning with urination. Please advise.

## 2017-06-16 DIAGNOSIS — Z12.31 OTHER SCREENING MAMMOGRAM: ICD-10-CM

## 2017-06-23 DIAGNOSIS — Z12.31 OTHER SCREENING MAMMOGRAM: ICD-10-CM

## 2017-07-24 ENCOUNTER — TELEPHONE (OUTPATIENT)
Dept: FAMILY MEDICINE | Facility: CLINIC | Age: 52
End: 2017-07-24

## 2017-07-24 ENCOUNTER — LAB VISIT (OUTPATIENT)
Dept: LAB | Facility: HOSPITAL | Age: 52
End: 2017-07-24
Attending: NURSE PRACTITIONER
Payer: MEDICAID

## 2017-07-24 DIAGNOSIS — R10.2 PELVIC PAIN IN FEMALE: ICD-10-CM

## 2017-07-24 DIAGNOSIS — Z20.9 EXPOSURE TO COMMUNICABLE DISEASE: ICD-10-CM

## 2017-07-24 DIAGNOSIS — R10.9 FLANK PAIN: ICD-10-CM

## 2017-07-24 DIAGNOSIS — R31.9 HEMATURIA: ICD-10-CM

## 2017-07-24 DIAGNOSIS — R07.89 CHEST WALL PAIN: ICD-10-CM

## 2017-07-24 DIAGNOSIS — R10.2 PELVIC PAIN IN FEMALE: Primary | ICD-10-CM

## 2017-07-24 LAB
ALBUMIN SERPL BCP-MCNC: 3.7 G/DL
ALP SERPL-CCNC: 84 U/L
ALT SERPL W/O P-5'-P-CCNC: 17 U/L
ANION GAP SERPL CALC-SCNC: 6 MMOL/L
AST SERPL-CCNC: 13 U/L
BASOPHILS # BLD AUTO: 0.03 K/UL
BASOPHILS NFR BLD: 0.6 %
BILIRUB SERPL-MCNC: 0.3 MG/DL
BUN SERPL-MCNC: 14 MG/DL
C TRACH DNA SPEC QL NAA+PROBE: NOT DETECTED
CALCIUM SERPL-MCNC: 9.2 MG/DL
CHLORIDE SERPL-SCNC: 105 MMOL/L
CO2 SERPL-SCNC: 27 MMOL/L
CREAT SERPL-MCNC: 0.8 MG/DL
DIFFERENTIAL METHOD: NORMAL
EOSINOPHIL # BLD AUTO: 0.2 K/UL
EOSINOPHIL NFR BLD: 3.9 %
ERYTHROCYTE [DISTWIDTH] IN BLOOD BY AUTOMATED COUNT: 13.7 %
EST. GFR  (AFRICAN AMERICAN): >60 ML/MIN/1.73 M^2
EST. GFR  (NON AFRICAN AMERICAN): >60 ML/MIN/1.73 M^2
GLUCOSE SERPL-MCNC: 87 MG/DL
HCT VFR BLD AUTO: 42 %
HGB BLD-MCNC: 13.8 G/DL
LYMPHOCYTES # BLD AUTO: 1.7 K/UL
LYMPHOCYTES NFR BLD: 30.6 %
MCH RBC QN AUTO: 29 PG
MCHC RBC AUTO-ENTMCNC: 32.9 G/DL
MCV RBC AUTO: 88 FL
MONOCYTES # BLD AUTO: 0.4 K/UL
MONOCYTES NFR BLD: 7.1 %
N GONORRHOEA DNA SPEC QL NAA+PROBE: NOT DETECTED
NEUTROPHILS # BLD AUTO: 3.1 K/UL
NEUTROPHILS NFR BLD: 57.6 %
PLATELET # BLD AUTO: 208 K/UL
PMV BLD AUTO: 10.4 FL
POTASSIUM SERPL-SCNC: 4.1 MMOL/L
PROT SERPL-MCNC: 7.1 G/DL
RBC # BLD AUTO: 4.76 M/UL
SODIUM SERPL-SCNC: 138 MMOL/L
WBC # BLD AUTO: 5.39 K/UL

## 2017-07-24 PROCEDURE — 85025 COMPLETE CBC W/AUTO DIFF WBC: CPT

## 2017-07-24 PROCEDURE — 80053 COMPREHEN METABOLIC PANEL: CPT

## 2017-07-24 PROCEDURE — 87591 N.GONORRHOEAE DNA AMP PROB: CPT

## 2017-07-24 PROCEDURE — 36415 COLL VENOUS BLD VENIPUNCTURE: CPT | Mod: PO

## 2017-07-24 PROCEDURE — 86703 HIV-1/HIV-2 1 RESULT ANTBDY: CPT

## 2017-07-24 NOTE — TELEPHONE ENCOUNTER
Patient called about pelvic pain and dysuria.  She's also had some right-sided back and chest pain.  She has experienced right-sided and chest and back pain previously.  She was unable to attend the MRI due to transportation and business issues at that time.  She continues to mention neck shoulder and arm pain.  She has some difficulty with ADLs.  We discussed MRI in the past but again as noted had to reschedule.  She states intent to call and reschedule MRI.  We discussed checking a urine test and some blood work.  I'll go ahead and schedule that.  Offered std test.

## 2017-07-25 LAB — HIV 1+2 AB+HIV1 P24 AG SERPL QL IA: NEGATIVE

## 2017-07-25 RX ORDER — OXYCODONE AND ACETAMINOPHEN 5; 325 MG/1; MG/1
1 TABLET ORAL EVERY 6 HOURS PRN
Qty: 15 TABLET | Refills: 0 | Status: SHIPPED | OUTPATIENT
Start: 2017-07-25 | End: 2018-04-19

## 2017-07-25 NOTE — TELEPHONE ENCOUNTER
Spoke with pt again today. Lab results shared. Pt states fever 101.3 last night.  She has abd and back pain and urine test shows blood in urine. I advise her to get CT. I will order stat due to blood, pain, and fever.    Consider renal stone. Add percocet.

## 2017-07-26 ENCOUNTER — TELEPHONE (OUTPATIENT)
Dept: FAMILY MEDICINE | Facility: CLINIC | Age: 52
End: 2017-07-26

## 2017-07-26 ENCOUNTER — HOSPITAL ENCOUNTER (OUTPATIENT)
Dept: RADIOLOGY | Facility: HOSPITAL | Age: 52
Discharge: HOME | End: 2017-07-26
Attending: NURSE PRACTITIONER
Payer: MEDICAID

## 2017-07-26 DIAGNOSIS — R10.9 FLANK PAIN: ICD-10-CM

## 2017-07-26 DIAGNOSIS — R31.9 HEMATURIA: ICD-10-CM

## 2017-07-26 DIAGNOSIS — R10.2 PELVIC PAIN IN FEMALE: ICD-10-CM

## 2017-07-26 DIAGNOSIS — R10.2 PELVIC PAIN IN FEMALE: Primary | ICD-10-CM

## 2017-07-26 PROCEDURE — 74177 CT ABD & PELVIS W/CONTRAST: CPT | Mod: 26,,, | Performed by: RADIOLOGY

## 2017-07-26 PROCEDURE — 25500020 PHARM REV CODE 255: Performed by: NURSE PRACTITIONER

## 2017-07-26 PROCEDURE — 74177 CT ABD & PELVIS W/CONTRAST: CPT | Mod: TC

## 2017-07-26 RX ADMIN — IOHEXOL 100 ML: 350 INJECTION, SOLUTION INTRAVENOUS at 07:07

## 2017-07-26 NOTE — TELEPHONE ENCOUNTER
Call pt. Ct looks good. No masses.  Urine had trace blood. I am going to refer her to urology for evaluation. This is routine.   Also consider hormones contributing.

## 2017-07-31 ENCOUNTER — OFFICE VISIT (OUTPATIENT)
Dept: FAMILY MEDICINE | Facility: CLINIC | Age: 52
End: 2017-07-31
Payer: MEDICAID

## 2017-07-31 VITALS
TEMPERATURE: 98 F | SYSTOLIC BLOOD PRESSURE: 118 MMHG | OXYGEN SATURATION: 97 % | BODY MASS INDEX: 30.49 KG/M2 | DIASTOLIC BLOOD PRESSURE: 78 MMHG | HEIGHT: 67 IN | HEART RATE: 98 BPM | WEIGHT: 194.25 LBS

## 2017-07-31 DIAGNOSIS — M50.30 DDD (DEGENERATIVE DISC DISEASE), CERVICAL: Primary | ICD-10-CM

## 2017-07-31 DIAGNOSIS — M25.512 ACUTE PAIN OF LEFT SHOULDER: ICD-10-CM

## 2017-07-31 DIAGNOSIS — M54.50 ACUTE BILATERAL LOW BACK PAIN WITHOUT SCIATICA: ICD-10-CM

## 2017-07-31 PROCEDURE — 99213 OFFICE O/P EST LOW 20 MIN: CPT | Mod: PBBFAC,PO | Performed by: NURSE PRACTITIONER

## 2017-07-31 PROCEDURE — 99999 PR PBB SHADOW E&M-EST. PATIENT-LVL III: CPT | Mod: PBBFAC,,, | Performed by: NURSE PRACTITIONER

## 2017-07-31 PROCEDURE — 99215 OFFICE O/P EST HI 40 MIN: CPT | Mod: S$PBB,,, | Performed by: NURSE PRACTITIONER

## 2017-07-31 RX ORDER — ESTRADIOL 0.1 MG/G
CREAM VAGINAL
COMMUNITY
Start: 2017-07-26 | End: 2018-04-19

## 2017-07-31 NOTE — PROGRESS NOTES
This dictation has been generated using Dragon Dictation some phonetic errors may occur.     Lorraine Fonseca was seen today for results.    Diagnoses and all orders for this visit:    DDD (degenerative disc disease), cervical  -     Ambulatory Referral to Physical/Occupational Therapy    Acute pain of left shoulder  Comments:  suspect rotator cuff involvement. likely degenerative  Orders:  -     Ambulatory Referral to Physical/Occupational Therapy    Acute bilateral low back pain without sciatica      Degenerative disc disease cervical spine patient will call at MRI arranged.  Also proceed with PT.  Left shoulder pain I suspect some degenerative rotator cuff disease.  Proceed with physical therapy.  Offered patient an orthopedic referral but she is hesitant.  Not really desirous of shot at this time.  Low back pain.  Refer to physical therapy.  Atrophic vaginitis.  GYN has started her on vaginal hormone replacement.  Also reviewing the record I see suggestion of interstitial cystitis on cystoscope.  She needs to follow-up with urology.  These 2 things likely account for hematuria.  Patient denies any gross hematuria.      Return if symptoms worsen or fail to improve.    In excessive of 45 minutes spent with patient with greater than 50% of time dedicated to education on symptoms, diagnosis, treatments, and coordination of care.     ________________________________________________________________  ________________________________________________________________        Chief Complaint   Patient presents with    Results     History of present illness  This 51 y.o. presents today for complaint of follow-up on multiple issues.  She contacted me recently about vaginal discomfort.  She has seen GYN since that discussion.  As suspected she was diagnosed with atrophic vaginitis.  She states they have started vaginal hormones etc.  She has had some blood in her urine on testing in the past.  She did see urology at the end of  last year and had a cystoscope in January.  There was some suggestion of interstitial cystitis.  She has not followed up with urology.  We discussed that she needs to do so.  Patient continues to note neck pain and left arm pain.  She notes pain that goes down the arm.  She does get numbness in her fingers at times.  Previous x-ray of the neck does note some degenerative disc disease.  She hasn't completed the MRI as scheduled.  Discussed that she would probably benefit from some physical therapy.  Patient also notes some shoulder pain.  She notes decreased range of motion in the shoulder.  Patient indicates pain with elevation.  She is on replace her arm behind her back.  She has difficulty dressing.  We discussed physical therapy might be of benefit.  I offered her orthopedic referral with injection but she was hesitant.  Low back pain.  This has been an ongoing issue.  Old x-ray and MRI is reviewed.  She is open to trying PT.  Review of systems  No fever or chills  No chest pain at today's visit.  No shortness of breath or dyspnea on exertion.  No nausea vomiting or diarrhea  No rash  Denies gross hematuria  No change in bowel or bladder habits    Past medical and social history reviewed    Past Medical History:   Diagnosis Date    Anxiety     Back problem     Diabetes mellitus type I     no meds since weight loss few years back    General anesthetics causing adverse effect in therapeutic use     sometimes hard to sedate--    Pneumonia     Thyroid disease     thyroid nodules       Past Surgical History:   Procedure Laterality Date     SECTION  x2     and     HYSTERECTOMY      Partial abd    RHINOPLASTY TIP         Family History   Problem Relation Age of Onset    Diabetes Mother     Heart disease Mother     Hyperlipidemia Mother     Hypertension Mother        Social History     Social History    Marital status: Single     Spouse name: N/A    Number of children: N/A    Years of  "education: N/A     Social History Main Topics    Smoking status: Current Every Day Smoker     Packs/day: 1.00     Types: Cigarettes    Smokeless tobacco: None    Alcohol use No    Drug use: No    Sexual activity: Not Asked     Other Topics Concern    None     Social History Narrative    None       Current Outpatient Prescriptions   Medication Sig Dispense Refill    buPROPion (WELLBUTRIN XL) 150 MG TB24 tablet Take 1 tablet (150 mg total) by mouth every morning. 30 tablet 3    ESTRACE 0.01 % (0.1 mg/gram) vaginal cream       ibuprofen (ADVIL,MOTRIN) 800 MG tablet Take 1 tablet (800 mg total) by mouth 3 (three) times daily as needed for Pain. 90 tablet 0    lorazepam (ATIVAN) 0.5 MG tablet Take 1 tablet (0.5 mg total) by mouth every 6 (six) hours as needed. 60 tablet 3    oxycodone-acetaminophen (PERCOCET) 5-325 mg per tablet Take 1 tablet by mouth every 6 (six) hours as needed for Pain. 15 tablet 0    promethazine (PHENERGAN) 25 MG tablet Take 1 tablet (25 mg total) by mouth every 6 (six) hours as needed for Nausea. 60 tablet 0     No current facility-administered medications for this visit.        Review of patient's allergies indicates:   Allergen Reactions    Vicodin [hydrocodone-acetaminophen] Other (See Comments)     "Makes me Hyper." Doesn't help the pain.    Percocet [oxycodone-acetaminophen] Nausea Only     Can take Percocet if she takes Phenergan or Zofran.       Physical examination  Vitals Reviewed  Gen. Well-dressed well-nourished no apparent distress  Skin warm dry and intact.  No rashes noted.  Neck is supple without adenopathy  Chest.  Respirations are even unlabored.  Lungs are clear to auscultation.  Cardiac regular rate and rhythm.  No chest wall adenopathy noted.    Abdomen is soft and nondistended.  Neuro. Awake alert oriented x4.  Normal judgment and cognition noted.  Straight leg raise is negative.  Extremities no clubbing cyanosis or edema noted.  Fairly normal spinal alignment.  " Patient does have tenderness in the lumbar spine.  She has palpable spasms of the lumbar paraspinal muscles and thoracic.  No pain with internal or external rotation of the hip.  No pain with cross leg examination.  Left shoulder decreased range of motion due to stated pain even with passive range of motion patient unable to elevate the arm 90°.  She is unable to put her hand behind her back.  Strength is intact when compared to the ipsilateral side so doubtful of acute complete tear.  She is able to externally rotate against minimal resistance and internally rotate partially against minimal resistance.  She is also able to move the elbow laterally away from the body and towards the body against minimal resistance.  She does have some restriction in range of motion on the right also but denies any pain.  This restriction is loss of range of motion with elevation of the arm above the head.  Cervical spine evaluation fairly normal.  There is minimal loss of curvature.  There is some restriction in range of motion.    Call or return to clinic prn if these symptoms worsen or fail to improve as anticipated.

## 2017-08-09 ENCOUNTER — CLINICAL SUPPORT (OUTPATIENT)
Dept: REHABILITATION | Facility: HOSPITAL | Age: 52
End: 2017-08-09
Attending: NURSE PRACTITIONER
Payer: MEDICAID

## 2017-08-09 DIAGNOSIS — M62.89 MUSCLE TIGHTNESS: ICD-10-CM

## 2017-08-09 DIAGNOSIS — G89.29 CHRONIC LEFT SHOULDER PAIN: ICD-10-CM

## 2017-08-09 DIAGNOSIS — M54.2 NECK PAIN: ICD-10-CM

## 2017-08-09 DIAGNOSIS — R29.3 POSTURE IMBALANCE: ICD-10-CM

## 2017-08-09 DIAGNOSIS — M25.512 CHRONIC LEFT SHOULDER PAIN: ICD-10-CM

## 2017-08-09 PROCEDURE — 97110 THERAPEUTIC EXERCISES: CPT | Mod: PN

## 2017-08-09 PROCEDURE — 97161 PT EVAL LOW COMPLEX 20 MIN: CPT | Mod: PN

## 2017-08-09 NOTE — PLAN OF CARE
"  TIME RECORD    Date: 08/09/2017    Start Time:  1414  Stop Time:  1510      OUTPATIENT PHYSICAL THERAPY   PATIENT EVALUATION  Onset Date: 3-4 months prior  Primary Diagnosis:   1. Neck pain     2. Chronic left shoulder pain     3. Posture imbalance       Treatment Diagnosis: muscle tightness, posture imbalance, muscle weakness, neck pain, shoulder pain, decreased ROM  Past Medical History:   Diagnosis Date    Anxiety     Back problem     Diabetes mellitus type I     no meds since weight loss few years back    General anesthetics causing adverse effect in therapeutic use     sometimes hard to sedate--    Pneumonia     Thyroid disease     thyroid nodules     Precautions: as listed above  Prior Therapy: none  Medications: Lorraine Childers has a current medication list which includes the following prescription(s): bupropion, estrace, ibuprofen, lorazepam, oxycodone-acetaminophen, and promethazine.  Nutrition:  Normal, Overweight  History of Present Illness: PT states she fell about 15 years prior falling off of a 4 awad landing on her L side, however, pt was able to get up without problem. Pt also stating she was a  in [place] about 20 years prior with heavy use of the LUE. Pt states her recent pain onset was about 3-4 months prior that began with swelling of her LUE and has intermittently progressed with pain into the forearm. Pt denies treatment.   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 Pt with great difficulty grabbing a cup of " "coffee or donning upper body clothing. PT is R hand dominant. PT has noticed a decrease in  strength, however, no loss of fine motor. PT repots pain wakes her up at night, sometimes she is able to change position and return to sleep. "I feel like I am carrying cement on my shoulders"    Pain:  Location: L side cervical spine, LUE    Description: Aching, Dull, Burning, Throbbing, Tight, Tingling, Sharp, Electric and Shooting  Activities Which Increase Pain: anything requiring UE movement  Activities Which Decrease Pain: rest  Pain Scale: 3/10 at best 5/10 now  10/10 at worst    Objective     OBSERVATION/POSTURE: pt in sitting with forward head posture, rounded shoulder  Spurling: positive - pressur ein lower back  Vertebral Artery: NT    Cervical ROM:   Pain/Dysfunction with Movement:   Norms   Flexion  35* 80-90   Extension  34* 70   Right side bending  33* 20-45   Left side bending  25* 20-45   Right rotation  30% limitaiton increased pain 90   Left rotation  25% limitaiton increased pain L side 90       Segment Myotome Positive/Negative   C5 Infraspinatus (suprascapular n.) +*    Deltoid (axillary n.) +*   C6 Subscapularis (subscapular n.) +*    Biceps (musculocutaneous n.) +*    ECRL (Radial n.) +*   C7 Triceps (radial n.) +*    FCU (ulnar n.) +*   C8 Extensor Pollicis Longus (radial n) +*    Opponens Pollicis (median n.) +*   T1 Interossei  (deep ulnar n.) +*     RUE: WNL thorughout    SENSATION/REFLEX: grossly intact to light touch throughout BUE  PALPATION: significant tenderness to palpation over the L upper trap, L levator, L supraspinatus, LUE  TESTS:     Cervical Spine joint mobility: unable to assess 2/2 pain   Distraction: mild relief of symptoms  ULTT   Median Nerve: +   Radial Nerve: +   Ulnar Nerve: +  Thoracic Outlet: NT  SHOULDER SCREEN:  Shoulder  Right   Left  Pain/Dysfunction with Movement    AROM PROM MMT AROM PROM MMT    flexion WNL  4 113*  NT Shoulder and under arm   extension 45  4 38*  " NT    abduction 128  4 72*  NT Pain in lateral shoulder   adduction   4   NT    Internal rotation T8  4 sacrum*  NT Pain in shoulder and neck   ER at 90° abd C7  4 occiput*  NT Pain in shoulder and neck     SCAPULAR STRENGTH: RUE 4-/5 throughout - LUE unable to be tested 2/2 pain    FOTO: 54% limitation    today's treatment x15 minutes:  education: educated in evaluation findings and POC.  Educated in expectations of treatment, provider's contact information (email and phone) given to pt for communication of any questions and concerns.  HEP instruction and ther ex: instructed and performed following:  manual therapy: application of kinesiotape to the L upper trap, inhibition technique to anterior and posterior fibers using Y strip. Patient received education regarding appropriate care and removal of Kinesiotape. Patient instructed in proper removal techniques if skin irritation occurs.        Assessment       Initial Assessment (Pertinent finding, problem list and factors affecting outcome): Ms. Lorraine Benedict is a 50 y/o female referred to outpatient PT for neck and shoulder pain. Pt presenting with decreased ROM of the cervical spine and L shoulder 2/2 pain, muscle tightness of the cervical spine, posture imbalance, weakness of trunk musculature, neural tension throughout the LUE and positive myotomes throughout the LUE. Pt signs and symptoms consistent with referring diagnosis likely secondary to increased tension of the L upper trap, overuse injury of the L rotator cuff, and postural imbalance. Pt would benefit from skilled PT to address above stated problems, as well as, achieve pt goals within a timely manner. Pt has set realistic goals and has verbalized good understanding and agreement with reported diagnosis, prognosis and treatment. Pt demonstrates no additional cultural, spiritual or educational need and currently has no barriers to learning.      History  Co-morbidities and personal factors that may  impact the plan of care Examination  Body Structures and Functions, activity limitations and participation restrictions that may impact the plan of care Clinical Presentation   Decision Making/ Complexity Score   Co-morbidities:      anxiety            Personal Factors:   Takes care of 2 acre yard and is a single mother Body Regions:  weakness, impaired functional mobility, pain, decreased ROM, impaired joint extensibility and impaired muscle length  Body Systems:   MSK, NM      Activity limitations:   Unable to lift, reach, push, pull    Participation Restrictions: (W/D/S)  Unable to drive for work, difficulty sitting at desk, unable to assist with household chores       stable  low             Rehab Potiential: fair    Short Term Goals (4 Weeks):   1. Pt will report 20% reduction in pain of the L cervical spine and shoulder for ease with ADL's  2. PT will demonstrate 5 degree improvement in BUE and cervical spine ROM for ease with donning upper body clothing  3. Pt will demonstrate 1/3 MMT improvement in bilateral pericapsular strength for ease with sustaining upright posture at work  Long Term Goals (8 Weeks):   1. Pt will report 50% reduction in pain of the L cervical spine and shoulder for ease with resting at night  2. Pt will demonstrate functional cervical spine and LUE ROM in all planes for ease with donning undergarments  3. Pt will demonstrate 4/5 strength or greater in the L shoudler and periscapular muscles for ease with completing work related activities  4. Pt will report being independent with HEP for maintenance of improvements gained during therapy session    Plan     Certification Period: 8/9/17 to 11/9/17  Recommended Treatment Plan: 2 times per week for 8 weeks: Electrical Stimulation PRN, Iontophoresis (with dexamethasone PRN), Manual Therapy, Moist Heat/ Ice, Neuromuscular Re-ed, Patient Education, Therapeutic Activites, Therapeutic Exercise and Other therapeutic taping, dry needling, aquatic  therapy    Other Recommendations: none      Therapist: OSIEL Muniz THE NEED FOR THESE SERVICES FURNISHED UNDER THIS PLAN OF TREATMENT AND WHILE UNDER MY CARE    Physician's comments: ________________________________________________________________________________________________________________________________________________      Physician's Name: ___________________________________

## 2017-08-23 ENCOUNTER — CLINICAL SUPPORT (OUTPATIENT)
Dept: REHABILITATION | Facility: HOSPITAL | Age: 52
End: 2017-08-23
Attending: NURSE PRACTITIONER
Payer: MEDICAID

## 2017-08-23 DIAGNOSIS — G89.29 CHRONIC LEFT SHOULDER PAIN: ICD-10-CM

## 2017-08-23 DIAGNOSIS — M54.2 NECK PAIN: Primary | ICD-10-CM

## 2017-08-23 DIAGNOSIS — M25.512 CHRONIC LEFT SHOULDER PAIN: ICD-10-CM

## 2017-08-23 DIAGNOSIS — M62.89 MUSCLE TIGHTNESS: ICD-10-CM

## 2017-08-23 DIAGNOSIS — R29.3 POSTURE IMBALANCE: ICD-10-CM

## 2017-08-23 PROCEDURE — 97110 THERAPEUTIC EXERCISES: CPT | Mod: PN

## 2017-08-23 NOTE — PROGRESS NOTES
Name: Lorraine Childers  Clinic Number: [id number]  Date of Treatment: 08/23/2017   Diagnosis:   Encounter Diagnoses   Name Primary?    Neck pain Yes    Chronic left shoulder pain     Posture imbalance     Muscle tightness        Time in: 0200  Time Out: 0300  Total Treatment Time: 60'        Subjective:    Lorraine Fonseca reports continues increased pain into the LUE and cervical spine. Pt stating she has had to go out of town and on vacation so she hasn't been able to come to therapy. Pt reports mild relief from kinesiotape but would like to try dry needling. Pt stating the only way she can sleep is if she uses her granddaughters boppy.  Patient reports their pain to be 4/10 on a 0-10 scale with 0 being no pain and 10 being the worst pain imaginable.    Objective    Patient received individual therapy to increase strength, endurance, ROM, flexibility, posture and core stabilization with activities as follows:     Lorraine Fonseca received therapeutic exercises to develop strength, endurance, ROM, flexibility, posture and core stabilization for 45 minutes including:   UBE 3' forward 3' back  Seated DNF 3x10 3 second holds  Seated upper trap stretch 2x30 seconds ea.   Seated levator stretch 2x30 seconds  Seated scapular retraction 2x30 seconds  BUE flexion with dowel 2x10   Passive L shoulder ABD painfree range with grade 1 distraction 2x10  Isometric (PT resisted) gentle L shoulder ER/IR at 45 degrees abd  Supine L UE median nerve glide pain free range at 45 degrees abd      Lorraine Fonseca received the following manual therapy techniques: Soft tissue Mobilization were applied to the: L cervical spine for 15 minutes.   Application of FDN (10 minutes): Pt educated on benefits and potential side effects of dry needling. Educated pt on benefits, precautions, side effects followign IDN. Educated pt to use heat following treatment sessions if pt is experiencing pain or soreness. Pt verbalized good understanding of  education.  Pt signed written consent to dry needling Rx. Pt gave verbal consent for DN   Pre Post   Active Cervical Flexion DN DN   Active Cervical Extension DN DN (10% improvement)   Cervical Rotation DN DN (25% improvement to R)     Pt received dry needling to the below listed muscles using 1 needles.  L upper trap - twitch response elicited - stim applied      Written Home Exercises Provided: reviewed from IE  Pt demo good understanding of the education provided. Lorraine Fonseca demonstrated good return demonstration of activities.     Assessment:   Lorraine tolerated treatment session well this morning. Pt with excellent tissue response to FDN with great improvement in tissue mobility and immediate mild improvements in cervical spine ROM and would like to continue next treatment session. Pt requiring cueing for correct performance of scapular strengthening for decreased upper trap recruitment, as well as, for correct DNF without compensation. Pt with reported decreased subjective reports of soreness at end of treatment session.   Pt will continue to benefit from skilled PT intervention. Medical Necessity is demonstrated by:  Unable to participate fully in daily activities, Requires skilled supervision to complete and progress HEP and Weakness.    Patient is making good progress towards established goals.    Short Term Goals (4 Weeks):   1. Pt will report 20% reduction in pain of the L cervical spine and shoulder for ease with ADL's  2. PT will demonstrate 5 degree improvement in BUE and cervical spine ROM for ease with donning upper body clothing  3. Pt will demonstrate 1/3 MMT improvement in bilateral pericapsular strength for ease with sustaining upright posture at work  Long Term Goals (8 Weeks):   1. Pt will report 50% reduction in pain of the L cervical spine and shoulder for ease with resting at night  2. Pt will demonstrate functional cervical spine and LUE ROM in all planes for ease with donning  undergarments  3. Pt will demonstrate 4/5 strength or greater in the L shoudler and periscapular muscles for ease with completing work related activities  4. Pt will report being independent with HEP for maintenance of improvements gained during therapy session     Plan      Certification Period: 8/9/17 to 11/9/17  Recommended Treatment Plan: 2 times per week for 8 weeks: Electrical Stimulation PRN, Iontophoresis (with dexamethasone PRN), Manual Therapy, Moist Heat/ Ice, Neuromuscular Re-ed, Patient Education, Therapeutic Activites, Therapeutic Exercise and Other therapeutic taping, dry needling, aquatic therapy     Other Recommendations: none       Continue with established Plan of Care towards PT goals.     Salima Engle, PT

## 2017-08-25 ENCOUNTER — CLINICAL SUPPORT (OUTPATIENT)
Dept: REHABILITATION | Facility: HOSPITAL | Age: 52
End: 2017-08-25
Attending: NURSE PRACTITIONER
Payer: MEDICAID

## 2017-08-25 DIAGNOSIS — M25.512 CHRONIC LEFT SHOULDER PAIN: ICD-10-CM

## 2017-08-25 DIAGNOSIS — R29.3 POSTURE IMBALANCE: ICD-10-CM

## 2017-08-25 DIAGNOSIS — M54.2 NECK PAIN: Primary | ICD-10-CM

## 2017-08-25 DIAGNOSIS — M62.89 MUSCLE TIGHTNESS: ICD-10-CM

## 2017-08-25 DIAGNOSIS — G89.29 CHRONIC LEFT SHOULDER PAIN: ICD-10-CM

## 2017-08-25 PROCEDURE — 97110 THERAPEUTIC EXERCISES: CPT | Mod: PN

## 2017-08-25 NOTE — PROGRESS NOTES
Name: Lorraine Childers  Clinic Number: [id number]  Date of Treatment: 08/25/2017   Diagnosis:   Encounter Diagnoses   Name Primary?    Neck pain Yes    Chronic left shoulder pain     Posture imbalance     Muscle tightness        Time in: 1000  Time Out: 1045  Total Treatment Time: 45' (plus 10 minutes of heat at end of session)        Subjective:    Lorraine Fonseca reports she can tell a difference after being dry needled, yet continues to be sore in the LUE. Pt stating she would like to try needling again next week.   Patient reports their pain to be 3- 4/10 on a 0-10 scale with 0 being no pain and 10 being the worst pain imaginable. Pt reports compliance with her HEP    Objective    Patient received individual therapy to increase strength, endurance, ROM, flexibility, posture and core stabilization with activities as follows:     Lorraine Fonseca received therapeutic exercises to develop strength, endurance, ROM, flexibility, posture and core stabilization for 40 minutes including:   UBE 3' forward 3' back  Supine DNF 3x10 3 second holds  Supine L shoulder IR/ER AROM at 70 degrees abd 2x10  Sidelying shoulder ER 2x10 (painfree range)  Sidelying shoulder ABD painfree 2x10  Seated DNF 2x10  Seated cervical rotation x2 minutes  Seated upper trap stretch 2x30 seconds ea.   Seated levator stretch 2x30 seconds  Seated scapular retraction 2x10 3 second holds  BUE flexion with dowel 2x10   Standing median nerve glides (UE in neutral, sustained wrist extension with elbow flexion/extension) 2x10    Lorraine Fonseca received the following manual therapy techniques: Soft tissue Mobilization were applied to the: L cervical spine for 5 minutes.       Written Home Exercises Provided: reviewed from IE  Pt demo good understanding of the education provided. Lorraine Fonseca demonstrated good return demonstration of activities.     Assessment:   Lorraine tolerated treatment session well this morning. Pt with excellent tolerance to  progression of exercises this session, demonstrating ability to perform AROM of shoulder and cervical spine without significant provocation of pain in mid range. Pt able to perform median nerve glides independently without significant provocation of neural tension symptoms. Plan to continue improving periscapular strength and UE ROM as tolerated in the following sessions. Pt will continue to benefit from skilled PT intervention. Medical Necessity is demonstrated by:  Unable to participate fully in daily activities, Requires skilled supervision to complete and progress HEP and Weakness.    Patient is making good progress towards established goals.    Short Term Goals (4 Weeks):   1. Pt will report 20% reduction in pain of the L cervical spine and shoulder for ease with ADL's  2. PT will demonstrate 5 degree improvement in BUE and cervical spine ROM for ease with donning upper body clothing  3. Pt will demonstrate 1/3 MMT improvement in bilateral pericapsular strength for ease with sustaining upright posture at work  Long Term Goals (8 Weeks):   1. Pt will report 50% reduction in pain of the L cervical spine and shoulder for ease with resting at night  2. Pt will demonstrate functional cervical spine and LUE ROM in all planes for ease with donning undergarments  3. Pt will demonstrate 4/5 strength or greater in the L shoudler and periscapular muscles for ease with completing work related activities  4. Pt will report being independent with HEP for maintenance of improvements gained during therapy session     Plan      Certification Period: 8/9/17 to 11/9/17  Recommended Treatment Plan: 2 times per week for 8 weeks: Electrical Stimulation PRN, Iontophoresis (with dexamethasone PRN), Manual Therapy, Moist Heat/ Ice, Neuromuscular Re-ed, Patient Education, Therapeutic Activites, Therapeutic Exercise and Other therapeutic taping, dry needling, aquatic therapy     Other Recommendations: none       Continue with established  Plan of Care towards PT goals.     Salima Engle, PT

## 2017-09-25 ENCOUNTER — OFFICE VISIT (OUTPATIENT)
Dept: UROLOGY | Facility: CLINIC | Age: 52
End: 2017-09-25
Payer: MEDICAID

## 2017-09-25 VITALS
BODY MASS INDEX: 31.39 KG/M2 | RESPIRATION RATE: 14 BRPM | DIASTOLIC BLOOD PRESSURE: 78 MMHG | SYSTOLIC BLOOD PRESSURE: 116 MMHG | WEIGHT: 200 LBS | HEIGHT: 67 IN

## 2017-09-25 DIAGNOSIS — R31.29 MICROHEMATURIA: ICD-10-CM

## 2017-09-25 DIAGNOSIS — R10.2 PELVIC PAIN IN FEMALE: Primary | ICD-10-CM

## 2017-09-25 DIAGNOSIS — N94.10 DYSPAREUNIA IN FEMALE: ICD-10-CM

## 2017-09-25 DIAGNOSIS — N39.0 RECURRENT UTI: ICD-10-CM

## 2017-09-25 PROCEDURE — 99214 OFFICE O/P EST MOD 30 MIN: CPT | Mod: S$PBB,,, | Performed by: UROLOGY

## 2017-09-25 PROCEDURE — 3078F DIAST BP <80 MM HG: CPT | Mod: ,,, | Performed by: UROLOGY

## 2017-09-25 PROCEDURE — 3074F SYST BP LT 130 MM HG: CPT | Mod: ,,, | Performed by: UROLOGY

## 2017-09-25 PROCEDURE — 99213 OFFICE O/P EST LOW 20 MIN: CPT | Mod: PBBFAC | Performed by: UROLOGY

## 2017-09-25 PROCEDURE — 99999 PR PBB SHADOW E&M-EST. PATIENT-LVL III: CPT | Mod: PBBFAC,,, | Performed by: UROLOGY

## 2017-09-25 PROCEDURE — 3008F BODY MASS INDEX DOCD: CPT | Mod: ,,, | Performed by: UROLOGY

## 2017-09-25 RX ORDER — METHOCARBAMOL 750 MG/1
TABLET, FILM COATED ORAL
COMMUNITY
Start: 2017-08-07 | End: 2017-12-25 | Stop reason: ALTCHOICE

## 2017-09-25 RX ORDER — METRONIDAZOLE 7.5 MG/G
GEL VAGINAL
COMMUNITY
Start: 2017-08-09 | End: 2018-04-19 | Stop reason: ALTCHOICE

## 2017-09-25 RX ORDER — ESTRADIOL 1 MG/1
TABLET ORAL
Refills: 11 | COMMUNITY
Start: 2017-07-26 | End: 2018-04-19

## 2017-09-25 NOTE — LETTER
September 25, 2017      Jose Felix, NP  [address] [facility] [address]           [facility]  [address] [facility] [address]  [facility] [address]  Phone: [phone]          Patient: Lorraine Childers   MR Number: [id number]   YOB: 1965   Date of Visit: 9/25/2017       Dear Jose Felix:    Thank you for referring Lorraine Childers to me for evaluation. Attached you will find relevant portions of my assessment and plan of care.    If you have questions, please do not hesitate to call me. I look forward to following Lorraine Childers along with you.    Sincerely,    Loreta Hamilton MD    Enclosure  CC:  No Recipients    If you would like to receive this communication electronically, please contact [email or web address] or [phone] to request more information on [employer] Link access.    For providers and/or their staff who would like to refer a patient to [facility], please contact us through our one-stop-shop provider referral line, [facility] , at [phone].    If you feel you have received this communication in error or would no longer like to receive these types of communications, please e-mail [email or web address]

## 2017-09-25 NOTE — PROGRESS NOTES
Subjective:       Lorraine Childers is a 51 y.o. female who is an established patient who was referred by Isidro BEGUM for evaluation of pelvic pain.      She has been seeing PCP for pelvic pain/dysuria. She reports recurrent UTI symptoms since 10/16. Gyn has examined her and treated her for BV and yeast vaginitis (notes not available for review). She continues to have dysuria, frequency, and urgency. Denies UI. She continues to report constant pelvic pain. She had pelvic US from gyn that was reportedly normal. She is s/p hysterectomy. She saw GI who is planning colonoscopy in 1/17. Denies nephrolithiasis. Also with urgency, frequency, nocturia x 2. Pain worse when laying down at night.     She reports pain in pelvis but not during voiding. Pain is in pelvic region. She took Tramadol and ibuprofen rx from Isidro BEGUM with some relief.     She saw Dr Garcia several years ago for similar symptoms who did cystoscopy under anesthesia due to pain. I repeated cystoscopy in 1/17 - +petechiae after hydrodistention.     She has known long term anxiety. Currently everyday smoker. Chart states DM1 but not accurate - she is prediabetic. Not on insulin.     UCx 12/16, 5/17, 7/17 - negative    She returns now with c/o microhematuria, continued pelvic pain, and UTIs (all cultures negative). She continues to have issues with should pain, LBP, etc (seeing PT). She does not high level of stress lately. UA clear of RBCs today. UA micro 7/17 showed 6 RBCs.       The following portions of the patient's history were reviewed and updated as appropriate: allergies, current medications, past family history, past medical history, past social history, past surgical history and problem list.    Review of Systems  Constitutional: no fever or chills  ENT: no nasal congestion or sore throat  Respiratory: no cough or shortness of breath  Cardiovascular: no chest pain or palpitations  Gastrointestinal: no nausea or vomiting, tolerating  "diet  Genitourinary: as per HPI  Hematologic/Lymphatic: no easy bruising or lymphadenopathy  Musculoskeletal: no arthralgias or myalgias  Skin: no rashes or lesions  Neurological: no seizures or tremors  Behavioral/Psych: no auditory or visual hallucinations       Objective:    Vitals:   /78   Resp 14   Ht 5' 7" (1.702 m)   Wt 90.7 kg (200 lb)   LMP 08/29/2012   BMI 31.32 kg/m²     Physical Exam   General: well developed, well nourished in no acute distress  Head: normocephalic, atraumatic  Neck: supple, trachea midline, no obvious enlargement of thyroid  HEENT: EOMI, mucus membranes moist, sclera anicteric, no hearing impairment  Lungs: symmetric expansion, non-labored breathing  Cardiovascular: regular rate and rhythm, normal pulses  Abdomen: soft, non tender, non distended, no palpable masses, no hepatosplenomegaly, no hernias, no CVA tenderness  Musculoskeletal: no peripheral edema, normal ROM in bilateral upper and lower extremities  Lymphatics: no cervical or inguinal lymphadenopathy  Skin: no rashes or lesions  Neuro: alert and oriented x 3, no gross deficits  Psych: normal judgment and insight, normal mood/affect and non-anxious  Genitourinary:   patient declined exam      Lab Review   Urine analysis today in clinic shows negative for all components     Lab Results   Component Value Date    WBC 5.39 07/24/2017    HGB 13.8 07/24/2017    HCT 42.0 07/24/2017    MCV 88 07/24/2017     07/24/2017     Lab Results   Component Value Date    CREATININE 0.8 07/24/2017    BUN 14 07/24/2017       Imaging  NA       Assessment/Plan:      1. Pelvic pain in female / dyspareunia / dysuria   - UA clear   - Uribel PRN - new rx given   - CT abd/pel 7/17 - normal kidneys   - Cystoscopy with lidocaine instillation in OR on 1/3/17    - Continue vaginal cream for atrophic vaginitis   - Consider vaginal valium, PFPT - discussed again today   - Referral for PFPT ([facility])     2. Recurrent UTI   - UCx negative   - May " be more stress related irritation    3. Microhematuria   - UA clear today   - Cystoscopy 1/17 essentially normal (no tumors)   - No need to repeat cystoscopy now      Follow up in 3 months

## 2017-10-03 RX ORDER — LORAZEPAM 0.5 MG/1
0.5 TABLET ORAL EVERY 6 HOURS PRN
Qty: 60 TABLET | Refills: 3 | Status: SHIPPED | OUTPATIENT
Start: 2017-10-03 | End: 2018-01-12 | Stop reason: SDUPTHER

## 2017-10-04 NOTE — PROGRESS NOTES
Requested refill. She had to cancel apt today due to daughters health and office visit at [facility].

## 2017-11-17 DIAGNOSIS — Z12.11 COLON CANCER SCREENING: ICD-10-CM

## 2017-12-25 ENCOUNTER — HOSPITAL ENCOUNTER (EMERGENCY)
Facility: HOSPITAL | Age: 52
Discharge: HOME | End: 2017-12-25
Attending: EMERGENCY MEDICINE
Payer: MEDICAID

## 2017-12-25 VITALS
TEMPERATURE: 97 F | BODY MASS INDEX: 31.39 KG/M2 | SYSTOLIC BLOOD PRESSURE: 131 MMHG | OXYGEN SATURATION: 99 % | WEIGHT: 200 LBS | HEART RATE: 59 BPM | HEIGHT: 67 IN | DIASTOLIC BLOOD PRESSURE: 69 MMHG | RESPIRATION RATE: 20 BRPM

## 2017-12-25 DIAGNOSIS — M79.602 LEFT ARM PAIN: Primary | ICD-10-CM
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
## 2017-12-25 PROCEDURE — 99283 EMERGENCY DEPT VISIT LOW MDM: CPT | Mod: 25

## 2017-12-25 PROCEDURE — 96372 THER/PROPH/DIAG INJ SC/IM: CPT

## 2017-12-25 PROCEDURE — 63600175 PHARM REV CODE 636 W HCPCS: Performed by: NURSE PRACTITIONER

## 2017-12-25 RX ORDER — MORPHINE SULFATE 10 MG/ML
2 INJECTION INTRAMUSCULAR; INTRAVENOUS; SUBCUTANEOUS
Status: COMPLETED | OUTPATIENT
Start: 2017-12-25 | End: 2017-12-25

## 2017-12-25 RX ORDER — ORPHENADRINE CITRATE 30 MG/ML
30 INJECTION INTRAMUSCULAR; INTRAVENOUS
Status: COMPLETED | OUTPATIENT
Start: 2017-12-25 | End: 2017-12-25

## 2017-12-25 RX ORDER — SULINDAC 150 MG/1
150 TABLET ORAL 2 TIMES DAILY
Qty: 10 TABLET | Refills: 0 | Status: SHIPPED | OUTPATIENT
Start: 2017-12-25 | End: 2018-04-19

## 2017-12-25 RX ORDER — KETOROLAC TROMETHAMINE 30 MG/ML
15 INJECTION, SOLUTION INTRAMUSCULAR; INTRAVENOUS
Status: DISCONTINUED | OUTPATIENT
Start: 2017-12-25 | End: 2017-12-25

## 2017-12-25 RX ORDER — CYCLOBENZAPRINE HCL 10 MG
10 TABLET ORAL 3 TIMES DAILY PRN
Qty: 21 TABLET | Refills: 0 | Status: SHIPPED | OUTPATIENT
Start: 2017-12-25 | End: 2018-04-19

## 2017-12-25 RX ADMIN — ORPHENADRINE CITRATE 30 MG: 30 INJECTION INTRAMUSCULAR; INTRAVENOUS at 10:12

## 2017-12-25 RX ADMIN — MORPHINE SULFATE 2 MG: 10 INJECTION INTRAVENOUS at 10:12

## 2017-12-25 NOTE — DISCHARGE INSTRUCTIONS
Use sling for comfort, maintain Ace wrap around elbow.  Do not take medications prescribed for 8 hours following discharge from emergency Department.  Medications given may cause drowsiness do not drive or operate heavy machinery while taking these medications.

## 2018-01-12 ENCOUNTER — OFFICE VISIT (OUTPATIENT)
Dept: FAMILY MEDICINE | Facility: CLINIC | Age: 53
End: 2018-01-12
Payer: MEDICAID

## 2018-01-12 VITALS
DIASTOLIC BLOOD PRESSURE: 72 MMHG | HEART RATE: 79 BPM | TEMPERATURE: 99 F | BODY MASS INDEX: 31.87 KG/M2 | HEIGHT: 67 IN | OXYGEN SATURATION: 99 % | WEIGHT: 203.06 LBS | SYSTOLIC BLOOD PRESSURE: 120 MMHG

## 2018-01-12 DIAGNOSIS — M25.522 LEFT ELBOW PAIN: Primary | ICD-10-CM

## 2018-01-12 DIAGNOSIS — T14.90XA TRAUMA: ICD-10-CM

## 2018-01-12 PROCEDURE — 99214 OFFICE O/P EST MOD 30 MIN: CPT | Mod: S$PBB,,, | Performed by: NURSE PRACTITIONER

## 2018-01-12 PROCEDURE — 99213 OFFICE O/P EST LOW 20 MIN: CPT | Mod: PBBFAC,PO | Performed by: NURSE PRACTITIONER

## 2018-01-12 PROCEDURE — 99999 PR PBB SHADOW E&M-EST. PATIENT-LVL III: CPT | Mod: PBBFAC,,, | Performed by: NURSE PRACTITIONER

## 2018-01-12 RX ORDER — LORAZEPAM 0.5 MG/1
TABLET ORAL
Qty: 90 TABLET | Refills: 2 | Status: SHIPPED | OUTPATIENT
Start: 2018-01-12 | End: 2018-05-16 | Stop reason: SDUPTHER

## 2018-01-12 NOTE — PROGRESS NOTES
This dictation has been generated using Dragon Dictation some phonetic errors may occur.     Lorraine Fonseca was seen today for fall.    Diagnoses and all orders for this visit:    Left elbow pain  -     X-Ray Elbow Complete Left; Future    Trauma  -     X-Ray Elbow Complete Left; Future      Left elbow pain check x-ray as above.  Discussed that sometimes small hairline fractures of missed on early x-rays and after some calcium deposits become more evident.  If x-rays are unremarkable consider proceeding to MRI of the elbow.  I'll review results and address accordingly.    Follow-up if symptoms worsen or fail to improve.      ________________________________________________________________  ________________________________________________________________        Chief Complaint   Patient presents with    Fall     left arm     History of present illness  This 52 y.o. presents today for complaint of left elbow pain.  Patient reports trauma to the elbow.  She followed up at the emergency room.  They did check an x-ray.  I reviewed the x-ray images and see no evidence of a fracture.  Given the history of fall and continued pain we discussed the possibility of a hairline fracture.  We will repeat the x-ray.  If nothing is evident we will check MRI.  Patient has pain with movement.  She notes pain with rotation of the radial head.  She notes pain with extension of the elbow.  She did have some numbness in the left index finger but that has resolved.  She still has some swelling and bruising.  Patient has been taking over-the-counter pain meds.  Review of systems  Complete review of systems otherwise negative    Past Medical History:   Diagnosis Date    Anxiety     Back problem     Diabetes mellitus type I     no meds since weight loss few years back    General anesthetics causing adverse effect in therapeutic use     sometimes hard to sedate--    Pneumonia     Thyroid disease     thyroid nodules       Past Surgical  History:   Procedure Laterality Date     SECTION  x2     and     HYSTERECTOMY      Partial abd    RHINOPLASTY TIP         Family History   Problem Relation Age of Onset    Diabetes Mother     Heart disease Mother     Hyperlipidemia Mother     Hypertension Mother        Social History     Social History    Marital status: Single     Spouse name: N/A    Number of children: N/A    Years of education: N/A     Social History Main Topics    Smoking status: Current Every Day Smoker     Packs/day: 1.00     Types: Cigarettes    Smokeless tobacco: Never Used    Alcohol use No    Drug use: No    Sexual activity: Not Asked     Other Topics Concern    None     Social History Narrative    None       Current Outpatient Prescriptions   Medication Sig Dispense Refill    buPROPion (WELLBUTRIN XL) 150 MG TB24 tablet Take 1 tablet (150 mg total) by mouth every morning. 30 tablet 3    cyclobenzaprine (FLEXERIL) 10 MG tablet Take 1 tablet (10 mg total) by mouth 3 (three) times daily as needed for Muscle spasms. 21 tablet 0    ESTRACE 0.01 % (0.1 mg/gram) vaginal cream       estradiol (ESTRACE) 1 MG tablet TK 1 T PO QAM  11    ibuprofen (ADVIL,MOTRIN) 800 MG tablet Take 1 tablet (800 mg total) by mouth 3 (three) times daily as needed for Pain. 90 tablet 0    methen-m.blue-s.phos-phsal-hyo 118-10-40.8-36 mg Cap Take 1 capsule by mouth 3 (three) times daily as needed (bladder pain). 40 capsule 11    metronidazole (METROGEL) 0.75 % vaginal gel       oxycodone-acetaminophen (PERCOCET) 5-325 mg per tablet Take 1 tablet by mouth every 6 (six) hours as needed for Pain. 15 tablet 0    promethazine (PHENERGAN) 25 MG tablet Take 1 tablet (25 mg total) by mouth every 6 (six) hours as needed for Nausea. 60 tablet 0    sulindac (CLINORIL) 150 MG tablet Take 1 tablet (150 mg total) by mouth 2 (two) times daily. 10 tablet 0    LORazepam (ATIVAN) 0.5 MG tablet TAKE 1 TABLET BY MOUTH EVERY 6 HOURS AS NEEDED 90  "tablet 2     No current facility-administered medications for this visit.        Review of patient's allergies indicates:   Allergen Reactions    Vicodin [hydrocodone-acetaminophen] Other (See Comments)     "Makes me Hyper." Doesn't help the pain.    Percocet [oxycodone-acetaminophen] Nausea Only     Can take Percocet if she takes Phenergan or Zofran.       Physical examination  Vitals Reviewed  Gen. Well-dressed well-nourished no apparent distress  Skin warm dry and intact.  No rashes noted.  Chest.  Respirations are even unlabored.    Neuro. Awake alert oriented x4.  Normal judgment and cognition noted.  Extremities no clubbing cyanosis or edema noted.  Left elbow patient able to extend elbow fully but.  Carefully due to pain.  She is able to rotate the thumb laterally without limitation but does so slowly due to pain.  She indicates pain over the area of the radial head.  She has some bruising over the lateral epicondyles.  No localized warmth palpable.  No crepitus with range of motion.    Call or return to clinic prn if these symptoms worsen or fail to improve as anticipated.    "

## 2018-01-16 ENCOUNTER — TELEPHONE (OUTPATIENT)
Dept: FAMILY MEDICINE | Facility: CLINIC | Age: 53
End: 2018-01-16

## 2018-01-16 NOTE — TELEPHONE ENCOUNTER
----- Message from Evy Wang sent at 1/12/2018  4:01 PM CST -----  Contact: Self   Patient returned your phone call. Please call back at [phone].

## 2018-02-07 ENCOUNTER — TELEPHONE (OUTPATIENT)
Dept: FAMILY MEDICINE | Facility: CLINIC | Age: 53
End: 2018-02-07

## 2018-02-07 NOTE — TELEPHONE ENCOUNTER
----- Message from Tami Kulkarni sent at 2/7/2018  8:45 AM CST -----  Contact: self  [phone]  Pt is requesting to speak to you regarding a xray on her arm and a sooner appt then what she has, Pt has a appt on 2-26-18. Pls call pt [phone]. Thanks......Dawn

## 2018-02-26 ENCOUNTER — OFFICE VISIT (OUTPATIENT)
Dept: FAMILY MEDICINE | Facility: CLINIC | Age: 53
End: 2018-02-26
Payer: MEDICAID

## 2018-02-26 VITALS
BODY MASS INDEX: 31.66 KG/M2 | TEMPERATURE: 98 F | HEART RATE: 70 BPM | WEIGHT: 201.75 LBS | OXYGEN SATURATION: 98 % | HEIGHT: 67 IN | SYSTOLIC BLOOD PRESSURE: 124 MMHG | DIASTOLIC BLOOD PRESSURE: 64 MMHG

## 2018-02-26 DIAGNOSIS — M54.9 BACK PAIN, UNSPECIFIED BACK LOCATION, UNSPECIFIED BACK PAIN LATERALITY, UNSPECIFIED CHRONICITY: ICD-10-CM

## 2018-02-26 DIAGNOSIS — M79.604 PAIN OF RIGHT LOWER EXTREMITY: ICD-10-CM

## 2018-02-26 DIAGNOSIS — M54.2 NECK PAIN: ICD-10-CM

## 2018-02-26 DIAGNOSIS — M54.16 LUMBAR RADICULOPATHY: ICD-10-CM

## 2018-02-26 DIAGNOSIS — T14.90XA TRAUMA: Primary | ICD-10-CM

## 2018-02-26 DIAGNOSIS — M54.12 CERVICAL RADICULOPATHY: ICD-10-CM

## 2018-02-26 DIAGNOSIS — M25.522 LEFT ELBOW PAIN: ICD-10-CM

## 2018-02-26 PROCEDURE — 99215 OFFICE O/P EST HI 40 MIN: CPT | Mod: PBBFAC,PO | Performed by: NURSE PRACTITIONER

## 2018-02-26 PROCEDURE — 99213 OFFICE O/P EST LOW 20 MIN: CPT | Mod: S$PBB,,, | Performed by: NURSE PRACTITIONER

## 2018-02-26 PROCEDURE — 99999 PR PBB SHADOW E&M-EST. PATIENT-LVL V: CPT | Mod: PBBFAC,,, | Performed by: NURSE PRACTITIONER

## 2018-02-26 PROCEDURE — 3008F BODY MASS INDEX DOCD: CPT | Mod: ,,, | Performed by: NURSE PRACTITIONER

## 2018-02-26 RX ORDER — DIAZEPAM 5 MG/1
TABLET ORAL
Qty: 4 TABLET | Refills: 0 | Status: SHIPPED | OUTPATIENT
Start: 2018-02-26 | End: 2018-04-19

## 2018-02-26 NOTE — PROGRESS NOTES
This dictation has been generated using Dragon Dictation some phonetic errors may occur.     Lorraine Fonseca was seen today for elbow pain.    Diagnoses and all orders for this visit:    Trauma    Left elbow pain  -     X-Ray Elbow Complete Left; Future    Neck pain  -     MRI Cervical Spine Without Contrast; Future    Cervical radiculopathy  -     Cancel: X-Ray Cervical Spine AP And Lateral; Future  -     MRI Cervical Spine Without Contrast; Future    Lumbar radiculopathy  -     Cancel: X-Ray Lumbar Spine AP And Lateral; Future  -     MRI Lumbar Spine Without Contrast; Future    Back pain, unspecified back location, unspecified back pain laterality, unspecified chronicity  -     Urinalysis; Future    Pain of right lower extremity  -     Cancel: US Lower Extremity Arteries Right; Future  -     US Lower Extremity Arteries Right; Future    Other orders  -     diazePAM (VALIUM) 5 MG tablet; ONE (2) TABLETS 30 MINUTES BEFORE PROCEDURE AS NEEDED FOR ANXIETY. PT MAY REPEAT X ONE (1-2) DOSES IF NEEDED.      History of a fall with trauma.  Ongoing left elbow pain was unable to reschedule the x-ray after last visit.  She had to  her daughter.  We will reschedule the x-ray. She is desirous of further imaging of the elbow and request MRI however I explained her that really need to repeat the x-ray and make sure there is a hairline fracture that was not visible on acute presentation to the emergency department. She does have a history of degenerative disc disease and since fall has had flare up in neck back and radicular pains.  Update MRI of the cervical and lumbar spine.  She is claustrophobic and will try her some diazepam for the imaging.    With lower back pain check urinalysis rule out urinary tract infection  Family history of neuropathy and personal history of numbness in the foot.  Patient concerned about circulation issues.  Unfortunately does have a history of smoking.  Check ultrasound as above.    Follow-up  if symptoms worsen or fail to improve.      ________________________________________________________________  ________________________________________________________________        Chief Complaint   Patient presents with    Elbow Pain     History of present illness  This 52 y.o. presents today for complaint of multiple complaints.  Patient does note trauma to the left elbow.  We had ordered an x-ray at last visit however she had to  her daughter for school and was unable to make it active in time.  She's had trouble rescheduling and we will reorder the x-ray at today's visit.  Since the fall she's had increase in neck and back pain.  She notes cervical radiculopathy on the left with pain going down into the index and middle finger of the left hand.  She also notes pain going down the left leg.  She does have numbness in the right leg at times and indicates the thigh area lateral aspect.  She does have a family history of neuropathy.  Her father who is not diabetic had neuropathy and ultimately had amputation of the toe.  Patient is concerned about her circulation.  She does smoke and expresses interest in quitting.  She has tried meds in the past but notes currently anxiety issues interfere with her ability to quit.  Left shoulder pain.  She has seen [facility] for surgery and awaiting a call back from them.  I encouraged her to reach out for them again today.  Has difficulties with activities of daily living dressing and combing hair and such.  Review of systems  No fever or chills.  Weight is stable.  No chest pain or shortness of breath  No nausea vomiting or diarrhea  No suicidal or violent ideation  Patient denies rash    Past Medical History:   Diagnosis Date    Anxiety     Back problem     Diabetes mellitus type I     no meds since weight loss few years back    General anesthetics causing adverse effect in therapeutic use     sometimes hard to sedate--    Pneumonia     Thyroid disease      thyroid nodules       Past Surgical History:   Procedure Laterality Date     SECTION  x2     and     HYSTERECTOMY      Partial abd    RHINOPLASTY TIP         Family History   Problem Relation Age of Onset    Diabetes Mother     Heart disease Mother     Hyperlipidemia Mother     Hypertension Mother        Social History     Social History    Marital status: Single     Spouse name: N/A    Number of children: N/A    Years of education: N/A     Social History Main Topics    Smoking status: Current Every Day Smoker     Packs/day: 1.00     Types: Cigarettes    Smokeless tobacco: Never Used    Alcohol use No    Drug use: No    Sexual activity: Not Asked     Other Topics Concern    None     Social History Narrative    None       Current Outpatient Prescriptions   Medication Sig Dispense Refill    buPROPion (WELLBUTRIN XL) 150 MG TB24 tablet Take 1 tablet (150 mg total) by mouth every morning. 30 tablet 3    cyclobenzaprine (FLEXERIL) 10 MG tablet Take 1 tablet (10 mg total) by mouth 3 (three) times daily as needed for Muscle spasms. 21 tablet 0    ESTRACE 0.01 % (0.1 mg/gram) vaginal cream       estradiol (ESTRACE) 1 MG tablet TK 1 T PO QAM  11    ibuprofen (ADVIL,MOTRIN) 800 MG tablet Take 1 tablet (800 mg total) by mouth 3 (three) times daily as needed for Pain. 90 tablet 0    LORazepam (ATIVAN) 0.5 MG tablet TAKE 1 TABLET BY MOUTH EVERY 6 HOURS AS NEEDED 90 tablet 2    andrew-m.blue-s.phos-phsal-hyo 118-10-40.8-36 mg Cap Take 1 capsule by mouth 3 (three) times daily as needed (bladder pain). 40 capsule 11    metronidazole (METROGEL) 0.75 % vaginal gel       oxycodone-acetaminophen (PERCOCET) 5-325 mg per tablet Take 1 tablet by mouth every 6 (six) hours as needed for Pain. 15 tablet 0    promethazine (PHENERGAN) 25 MG tablet Take 1 tablet (25 mg total) by mouth every 6 (six) hours as needed for Nausea. 60 tablet 0    sulindac (CLINORIL) 150 MG tablet Take 1 tablet (150 mg  "total) by mouth 2 (two) times daily. 10 tablet 0    diazePAM (VALIUM) 5 MG tablet ONE (2) TABLETS 30 MINUTES BEFORE PROCEDURE AS NEEDED FOR ANXIETY. PT MAY REPEAT X ONE (1-2) DOSES IF NEEDED. 4 tablet 0     No current facility-administered medications for this visit.        Review of patient's allergies indicates:   Allergen Reactions    Vicodin [hydrocodone-acetaminophen] Other (See Comments)     "Makes me Hyper." Doesn't help the pain.    Percocet [oxycodone-acetaminophen] Nausea Only     Can take Percocet if she takes Phenergan or Zofran.       Physical examination  Vitals Reviewed  Gen. Well-dressed well-nourished no apparent distress  Skin warm dry and intact.  No rashes noted.  Neck is supple without adenopathy  Chest.  Respirations are even unlabored.  Lungs are clear to auscultation.  Cardiac regular rate and rhythm.  No chest wall adenopathy noted.  Neuro. Awake alert oriented x4.  Normal judgment and cognition noted.  Extremities no clubbing cyanosis or edema noted.  Continues with tenderness over the lateral aspect of the elbow.  She does have pain with rotation of the radial head.  No localized warmth in the affected area.    Call or return to clinic prn if these symptoms worsen or fail to improve as anticipated.    "

## 2018-03-16 ENCOUNTER — TELEPHONE (OUTPATIENT)
Dept: FAMILY MEDICINE | Facility: CLINIC | Age: 53
End: 2018-03-16

## 2018-03-16 DIAGNOSIS — M25.519 SHOULDER PAIN, UNSPECIFIED CHRONICITY, UNSPECIFIED LATERALITY: Primary | ICD-10-CM

## 2018-03-16 DIAGNOSIS — S46.009D ROTATOR CUFF INJURY, UNSPECIFIED LATERALITY, SUBSEQUENT ENCOUNTER: ICD-10-CM

## 2018-03-16 NOTE — TELEPHONE ENCOUNTER
----- Message from Kaylyn Bautista sent at 3/16/2018  9:43 AM CDT -----  Contact: Pt  Pt was calling to get a referral to orthopedics.     Pt would like a call back at [phone].      Thank You

## 2018-03-19 ENCOUNTER — TELEPHONE (OUTPATIENT)
Dept: FAMILY MEDICINE | Facility: CLINIC | Age: 53
End: 2018-03-19

## 2018-03-26 ENCOUNTER — HOSPITAL ENCOUNTER (OUTPATIENT)
Dept: RADIOLOGY | Facility: HOSPITAL | Age: 53
Discharge: HOME | End: 2018-03-26
Attending: ORTHOPAEDIC SURGERY
Payer: MEDICAID

## 2018-03-26 ENCOUNTER — OFFICE VISIT (OUTPATIENT)
Dept: SPORTS MEDICINE | Facility: CLINIC | Age: 53
End: 2018-03-26
Payer: MEDICAID

## 2018-03-26 VITALS
BODY MASS INDEX: 31.55 KG/M2 | WEIGHT: 201 LBS | SYSTOLIC BLOOD PRESSURE: 107 MMHG | HEIGHT: 67 IN | DIASTOLIC BLOOD PRESSURE: 78 MMHG

## 2018-03-26 DIAGNOSIS — M25.512 LEFT SHOULDER PAIN, UNSPECIFIED CHRONICITY: ICD-10-CM

## 2018-03-26 DIAGNOSIS — M75.102 ROTATOR CUFF SYNDROME OF LEFT SHOULDER: ICD-10-CM

## 2018-03-26 DIAGNOSIS — G89.29 CHRONIC LEFT SHOULDER PAIN: ICD-10-CM

## 2018-03-26 DIAGNOSIS — M75.22 BICEPS TENDINITIS OF LEFT UPPER EXTREMITY: ICD-10-CM

## 2018-03-26 DIAGNOSIS — M25.512 CHRONIC LEFT SHOULDER PAIN: ICD-10-CM

## 2018-03-26 DIAGNOSIS — Z72.0 TOBACCO USER: ICD-10-CM

## 2018-03-26 DIAGNOSIS — M75.102 ROTATOR CUFF SYNDROME OF LEFT SHOULDER: Primary | ICD-10-CM

## 2018-03-26 DIAGNOSIS — M25.512 LEFT SHOULDER PAIN, UNSPECIFIED CHRONICITY: Primary | ICD-10-CM

## 2018-03-26 DIAGNOSIS — S46.012A TRAUMATIC COMPLETE TEAR OF LEFT ROTATOR CUFF, INITIAL ENCOUNTER: ICD-10-CM

## 2018-03-26 DIAGNOSIS — E66.9 OBESITY, UNSPECIFIED CLASSIFICATION, UNSPECIFIED OBESITY TYPE, UNSPECIFIED WHETHER SERIOUS COMORBIDITY PRESENT: ICD-10-CM

## 2018-03-26 PROCEDURE — 99213 OFFICE O/P EST LOW 20 MIN: CPT | Mod: PBBFAC,25,PO | Performed by: ORTHOPAEDIC SURGERY

## 2018-03-26 PROCEDURE — 99204 OFFICE O/P NEW MOD 45 MIN: CPT | Mod: 57,S$PBB,, | Performed by: ORTHOPAEDIC SURGERY

## 2018-03-26 PROCEDURE — 73030 X-RAY EXAM OF SHOULDER: CPT | Mod: TC,FY,PO,LT

## 2018-03-26 PROCEDURE — 73030 X-RAY EXAM OF SHOULDER: CPT | Mod: 26,LT,, | Performed by: RADIOLOGY

## 2018-03-26 PROCEDURE — 99999 PR PBB SHADOW E&M-EST. PATIENT-LVL III: CPT | Mod: PBBFAC,,, | Performed by: ORTHOPAEDIC SURGERY

## 2018-03-26 NOTE — LETTER
March 26, 2018      Jose Felix, NP  [address]  [place] [address]           [address] [address]  Phone: [phone]          Patient: Lorraine Childers   MR Number: [id number]   YOB: 1965   Date of Visit: 3/26/2018       Dear Jose Felix:    Thank you for referring Lorraine Childers to me for evaluation. Attached you will find relevant portions of my assessment and plan of care.    If you have questions, please do not hesitate to call me. I look forward to following Lorraine Childers along with you.    Sincerely,    Susanna Vann MD    Enclosure  CC:  No Recipients    If you would like to receive this communication electronically, please contact [email or web address] or [phone] to request more information on [employer] Link access.    For providers and/or their staff who would like to refer a patient to [facility], please contact us through our one-stop-shop provider referral line, [facility], at [phone].    If you feel you have received this communication in error or would no longer like to receive these types of communications, please e-mail [email or web address]

## 2018-03-26 NOTE — PATIENT INSTRUCTIONS
Rotator Cuff Tear  The rotator cuff is a group of muscles and tendons that surround the shoulder joint. These muscles and tendons hold the arm in its joint. They also help the shoulder to rotate. The rotator cuff can be torn from overuse or injury. Gradual wear and tear can lead to inflammation of these tendons. This can progress to gradual or sudden tears.  Symptoms of a torn rotator cuff include:  · Shoulder pain that gets worse when you raise your arm overhead  · Weakness of the shoulder muscles with overhead activity  · Popping and clicking when you move your shoulder  · Shoulder pain that wakes you up at night when sleeping on the hurt shoulder  Diagnosis is made by an MRI or arthroscopy. This is a surgical procedure to look inside the joint through a small tube. Partial rotator cuff tears can be treated by first resting, then strengthening the rotator cuff muscles.  Anti-inflammatory medicines, such as ibuprofen or naproxen, are useful. A limited number of steroid injections can be given. Surgery may be recommended for complete tears and partial tears that do not respond to medical treatment.  Home care  · Avoid activities that make your pain worse. This includes overhead activities, doing the same motion over and over, and heavy lifting.  · You may use over-the-counter pain medicines to control pain, unless another medicine was prescribed. If you have chronic liver or kidney disease or ever had a stomach ulcer or GI bleeding, talk with your healthcare provider before using these medicines.  · If you were given a sling, use it for comfort. After your pain decreases, dont keep your arm in the sling all the time. Take your arm out several times a day and move the shoulder joint, as you are able.  · Your healthcare provider may recommend gentle pendulum exercises. Stand or sit with your arm vertical and close to your side. Relax your shoulder muscles and gently swing the arm forward and back, side to side, and  in small circles for about 5 minutes. Do this once or twice a day. There should be only slight pain with this exercise.  · You may benefit from physical therapy or a home exercise program to strengthen your shoulder muscles. This will also increase your pain-free range of motion. Applying heat prior to exercises can help prepare the muscles and joint for activity. Talk to your healthcare provider about what is best for your condition.  Follow-up care  Follow up with your healthcare provider, or as advised.  When to seek medical advice  Call your healthcare provider right away if any of the following occur:  · Increasing shoulder pain  · Rapid swelling in the involved shoulder or arm  · Numbness, tingling, or pain radiating down the arm to the hand  · Loss of strength in the affected arm  Date Last Reviewed: 11/23/2015  © 6785-9445 [employer]. [address] [place] [address], [place] [address]. All rights reserved. This information is not intended as a substitute for professional medical care. Always follow your healthcare professional's instructions.        Understanding a Rotator Cuff Tendon Tear    The rotator cuff is a group of 4 muscles and their tendons in the shoulder. The muscles are located in the front, back, and top of the shoulder joint. They each have a strong band of tissue (tendon) that attaches to the top of the upper arm bone. This helps keep the arm bone firmly in place in the socket of the shoulder joint. The muscles and tendons of the rotator cuff also help the shoulder joint with certain movements. These include reaching the arm over the head and rotating the arm.  Any one of the rotator cuff tendons can fray or tear from causes such as injury and overuse. A tear may be partial, with some of the tendon still intact. Or it may be a complete tear, with the tendon fully torn. Both types can cause pain and weakness, and limit arm and shoulder movement. A rotator cuff tear often needs treatment  to heal properly.  Causes of a rotator cuff tendon tear  Causes can include:  · Wear and tear of the tendons from aging or normal use over time  · Overuse of the tendons from sports or work activities, especially those that involve repeated overhead movements  · Injury to the tendons from a fall or other accident  Symptoms of a rotator cuff tendon tear  Some people with a rotator cuff tendon tear have few or no symptoms. Others may have symptoms that range from mild to severe. Possible symptoms include:  · Pain in your shoulder, which may be worse with overhead movements or at night from lying on the affected side  · Weakness in your arm and shoulder  · Trouble lifting your arm up or rotating your arm  · Clicking or crackling sounds when moving or using your arm and shoulder  Treating a rotator cuff tendon tear  Treatment for a rotator cuff tendon tear depends on several factors. These include the severity of the tear and your symptoms. Options may include:  · Resting your arm and shoulder. This involves limiting certain movements, such as reaching above your head or lifting your arm up. These can slow healing and worsen symptoms. You may also need to avoid certain sports and types of work for a time.  · Cold packs or heat packs. These help reduce pain and swelling.  · Prescription or over-the-counter pain medicines. These help reduce pain and swelling.  · Injections of medicine into your shoulder. These help relieve pain and swelling for a time.  · Physical therapy and exercises.  These help improve strength, flexibility, and range of motion in your arm and shoulder.   · Surgery. You may need surgery if your tendon is completely torn or if other treatments dont relieve your symptoms. Different options are available. In many cases, the damaged tendon is repaired. It is then reattached to your arm bone.  Possible complications  · If a partial tear isnt given time to heal, it may get larger or tear completely. You  may then need more treatment.  · Even with treatment, a partial or complete tear may sometimes have trouble healing. The problem may become long-term (chronic). This can cause ongoing pain, weakness, and limited movement of your arm and shoulder.     When to call your healthcare provider  Call your healthcare provider right away if you have any of these:  · Fever of 100.4°F (38°C) or higher, or as directed  · Symptoms that dont get better with treatment, or get worse  · After surgery, symptoms at the incision sites such as redness, warmth, swelling, bleeding, or drainage  · New symptoms   Date Last Reviewed: 3/10/2016  © 8867-7702 [employer]. [address] [place] [address], [place] [address]. All rights reserved. This information is not intended as a substitute for professional medical care. Always follow your healthcare professional's instructions.        Shoulder Arthroscopy: Conditions Treated  You will be given instructions for how to prepare for your surgery and when you should arrive at the hospital or surgery center. Just before surgery, a doctor will talk to you about the anesthesia that will be used to keep you free of pain during surgery. You may be asked by several people to confirm which shoulder is being operated on. This is for your safety. Below are common shoulder problems and how they are treated during arthroscopy.       Impingement  · Repeated overhead movements can inflame your rotator cuff and bursa. A bone spur may also form. This causes pain and problems with certain arm movements. Impingement is also called bursitis or tendinitis.  · During surgery, an inflamed bursa may be removed. Bone may be trimmed. And the coracoacromial ligament may be detached. These steps make more room, relieving pressure and allowing the arm to move more freely.    Torn rotator cuff  · A rotator cuff can tear due to a sudden injury or from overuse. This can cause pain, weakness, and loss of normal shoulder  movement.  · During surgery, torn rotator cuff tendons may be trimmed. The tendons are then reattached to the humerus. This is done with stitches, anchors, or surgical tacks.    Stretched capsule  · A stretched capsule will remain loose. A loose capsule cant hold the joint firmly in place. The bones of the joint may feel like they move too much and the shoulder can dislocate.  · During arthroscopy, a stretched capsule is folded over itself and sutured in place. (This is done from inside the capsule.) This tightens the capsule, helping make the shoulder joint more stable.    Torn labrum  · The shoulder is a ball and socket joint.  The labrum normally supports and cushions the shoulder joint. In the case of a torn labrum, the socket that the ball (the upper end of your arm) fits into is not very deep. The shallow socket increases the  potential for instability.  · The labrum may tear off the rim of the glenoid. This can cause the joint to catch or feel like its slipping out of place. The shoulder may even dislocate.  · A torn labrum is repaired by reattaching it to the glenoid. This is often done with special anchors put into the glenoid bone. Sutures attached to the anchors are tied to hold the labrum in place. The joint then feels more stable.       Arthritis and loose bodies  · Arthritis is damage to joint cartilage with age and use. Injury or disease can also cause it. Wear and tear may also lead to loose bodies (pieces of bone or cartilage) or bone spurs in a joint.  · During surgery, bone spurs are removed. Rough parts of the joint are smoothed. Any loose body is removed from the joint. Bone may also be scraped or shaved to promote new cartilage growth.  Date Last Reviewed: 8/31/2015  © 8202-9570 [employer]. [address] [place] [address], [place] [address]. All rights reserved. This information is not intended as a substitute for professional medical care. Always follow your healthcare professional's  instructions.        Shoulder Arthroscopy  The shoulder is your bodys most flexible joint. It lets the arm move in almost any direction. But this flexibility has a price -- it makes the joint prone to injury. If you have a shoulder problem, a surgical procedure called arthroscopy can help.     A camera in the arthroscope allows your surgeon to view your shoulder joint on a monitor.   Your orthopaedic evaluation  Your doctor will ask about your symptoms and the history of your shoulder problem. He or she will examine your shoulder and may give you tests, such as an X-ray or MRI. These help your doctor find the cause of your shoulder problem.  Arthroscopy: Looking inside your joint  Arthroscopy is a procedure that allows your doctor to see and work inside your shoulder joint. Your doctor makes small incision in your shoulder and inserts a long, thin, lighted instrument, called an arthroscope. During surgery, the arthroscope sends live video images from inside your joint to a screen that your doctor views. Using these images, your doctor can diagnose and treat your shoulder problem. Because arthroscopy uses much smaller incisions than open surgery, recovery is often shorter and less painful.  Risks and possible complications of shoulder arthroscopy  · Stiffness or ongoing pain in your shoulder  · Bleeding or blood clots  · Infection  · Damage to nerves or blood vessels  You may still need open surgery after having arthroscopy.  Date Last Reviewed: 9/10/2015  © 6706-0100 [employer]. [address] [place] [address], [place] [address]. All rights reserved. This information is not intended as a substitute for professional medical care. Always follow your healthcare professional's instructions.        After Shoulder Arthroscopy     Physical therapy can help you regain full use of your shoulder. Your program will be tailored to your surgery.   After your arthroscopy, you will recover in the hospital or surgery center  for a few hours. In some cases, you may stay overnight. When you are able to go home, your healthcare provider will instruct you how to relieve any pain and how to care for your shoulder as it heals. To help with healing, your healthcare provider may prescribe a program of physical therapy (PT).  In the recovery room  After surgery, youll be taken to a recovery area to rest. Youll have a bandage to protect your incisions, and a sling to hold your arm in place. In some cases, cold packs or a cooling unit may be used to reduce swelling in your shoulder.  Going home  Before leaving the hospital or surgery center, be sure to know how to care for your shoulder at home. Ask any questions you have. Also know who to contact if you have questions later. When you are ready to leave the hospital or surgery center, an adult family member or friend will need to drive you home.  At home  · Take prescribed pain medicines as directed. Dont wait for pain to get bad before you take them.  · Ice your shoulder 3 times a day for 20 minutes at a time. Use an ice machine (if given one) or a bag of ice or frozen peas. Put a thin cloth between your skin and the ice source.  · Take care of your incisions as directed. You can begin bathing again in 3 days.  · See your doctor for a follow-up visit, typically around 5 days after surgery.  · Wear your sling as directed until your healthcare provider indicates you no longer need it.  · Complete your physical therapy program.  · Avoid these activities: _____________, _____________, ______________  Call your healthcare provider if you have  · Fever over 100.4°F (38°C)  · Bleeding from an incision  · Increased shoulder pain or swelling  · A red or oozing incision  · Numbness or tingling that doesnt go away 24 hours after surgery  Date Last Reviewed: 9/13/2015  © 4897-3453 [employer]. [address] [place] [address], [place] [address]. All rights reserved. This information is not intended  as a substitute for professional medical care. Always follow your healthcare professional's instructions.

## 2018-03-26 NOTE — PROGRESS NOTES
Subjective:          Chief Complaint: Lorraine Childers is a 52 y.o. female who had concerns including Pain of the Left Shoulder.    Patient is here for an evaluation of her left shoulder. She had a fall in December and went to the ED.     She received an injection and did three months of physical therapy.       Handedness: right-handed  Sport played:    Level:            Pain   This is a chronic problem. The current episode started more than 1 month ago. The problem occurs intermittently. The problem has been rapidly worsening. Pertinent negatives include no chest pain, fever, joint swelling, numbness or rash. The symptoms are aggravated by exertion. The treatment provided no relief.       Review of Systems   Constitution: Negative for fever and night sweats.   HENT: Negative for hearing loss.    Eyes: Negative for blurred vision and visual disturbance.   Cardiovascular: Negative for chest pain and leg swelling.   Respiratory: Negative for shortness of breath.    Endocrine: Negative for polyuria.   Hematologic/Lymphatic: Negative for bleeding problem.   Skin: Negative for rash.   Musculoskeletal: Negative for back pain, joint pain, joint swelling, muscle cramps and muscle weakness.   Gastrointestinal: Negative for melena.   Genitourinary: Negative for hematuria.   Neurological: Negative for loss of balance, numbness and paresthesias.   Psychiatric/Behavioral: Negative for altered mental status.       Pain Related Questions  Over the past 3 days, what was your average pain during activity? (I.e. running, jogging, walking, climbing stairs, getting dressed, ect.): 10  Over the past 3 days, what was your highest pain level?: 10  Over the past 3 days, what was your lowest pain level? : 0    Other  How many nights a week are you awakened by your affected body part?: 0  Was the patient's HEIGHT measured or patient reported?: Patient Reported  Was the patient's WEIGHT measured or patient reported?: Patient Reported       Objective:        General: Lorraine Fonseca is well-developed, well-nourished, appears stated age, in no acute distress, alert and oriented to time, place and person.     General    Vitals reviewed.  Constitutional: She is oriented to person, place, and time. She appears well-developed and well-nourished. No distress.   HENT:   Nose: Nose normal.   Mouth/Throat: No oropharyngeal exudate.   Eyes: Pupils are equal, round, and reactive to light. Right eye exhibits no discharge. Left eye exhibits no discharge.   Neck: Normal range of motion.   Cardiovascular: Normal rate and intact distal pulses.    Pulmonary/Chest: Effort normal and breath sounds normal. No respiratory distress.   Neurological: She is alert and oriented to person, place, and time. She has normal reflexes. She displays normal reflexes. No cranial nerve deficit. Coordination normal.   Psychiatric: She has a normal mood and affect. Her behavior is normal. Judgment and thought content normal.         Right Shoulder Exam     Inspection/Observation   Swelling: absent  Bruising: absent  Scars: absent  Deformity: absent  Scapular Winging: absent  Scapular Dyskinesia: negative  Atrophy: absent    Tenderness   The patient is experiencing no tenderness.        Range of Motion   Active Abduction:  90 normal   Passive Abduction:  100 normal   Extension:  0 normal   Forward Flexion:  180 normal   Forward Elevation: 180 normal  Adduction: 40 normal  External Rotation 0 degrees:  50 normal   External Rotation 90 degrees: 90 normal  Internal Rotation 0 degrees:  T8 normal   Internal Rotation 90 degrees:  30 normal     Tests & Signs   Apprehension: negative  Cross Arm: negative  Drop Arm: negative  Hawkin's test: negative  Impingement: negative  Sulcus: absent  Anterosuperior Escape: negative  Lag Sign 0 degrees: negative  Lag Sign 90 degrees: negative  Lift Off Sign: negative  Belly Press: negative  Active Compression Test (Roosevelt's Sign): negative  Yergason's Test:  negative  Speed's Test: negative  Anterior Drawer Test: 1+   Posterior Drawer Test: 1+  Relocation 90 degrees: negative  Relocation > 90 degrees: negative  Bear Hug: negative  Moving Valgus: negative  Jerk Test: negative    Other   Sensation: normal    Left Shoulder Exam     Inspection/Observation   Swelling: absent  Bruising: absent  Scars: absent  Deformity: absent  Scapular Winging: absent  Scapular Dyskinesia: negative  Atrophy: absent    Tenderness   The patient is tender to palpation of the greater tuberosity and acromioclavicular joint.    Range of Motion   Active Abduction:  90 abnormal   Passive Abduction:  90 abnormal   Extension:  0 normal   Forward Flexion:  90 abnormal   Forward Elevation: 90 abnormal  Adduction: 40 normal  External Rotation 0 degrees:  30 abnormal   External Rotation 90 degrees: 20 abnormal  Internal Rotation 0 degrees: abnormal Left shoulder internal rotation 0 degrees: body.   Internal Rotation 90 degrees:  0 abnormal     Tests & Signs   Apprehension: negative  Cross Arm: positive  Drop Arm: negative  Hawkin's test: positive  Impingement: positive  Sulcus: absent  Rotator Cuff Painful Arc/Range: severe  Anterosuperior Escape: negative  Lag Sign 0 degrees: negative  Lag Sign 90 degrees: negative  Lift Off Sign: negative  Belly Press: negative  Active Compression Test (Schoolcraft's Sign): positive  Yergasons's Test: negative  Speed's Test: positive  Anterior Drawer Test: 1+  Posterior Drawer Test: 1+  Relocation 90 degrees: negative  Relocation > 90 degrees: negative  Bear Hug: negative  Moving Valgus: negative  Jerk Test: negative    Other   Sensation: normal       Muscle Strength   Right Upper Extremity   Shoulder Abduction: 5/5   Shoulder Internal Rotation: 5/5   Shoulder External Rotation: 5/5   Supraspinatus: 5/5/5   Subscapularis: 5/5/5   Biceps: 5/5/5   Left Upper Extremity  Shoulder Abduction: 5/5   Shoulder Internal Rotation: 5/5   Shoulder External Rotation: 4/5   Supraspinatus:  5/5/5   Subscapularis: 5/5/5   Biceps: 5/5/5     Reflexes     Left Side  Biceps:  2+  Triceps:  2+  Brachioradialis:  2+    Right Side   Biceps:  2+  Triceps:  2+  Brachioradialis:  2+    Vascular Exam     Right Pulses      Radial:                    2+      Left Pulses      Radial:                    2+      Capillary Refill  Right Hand: normal capillary refill  Left Hand: normal capillary refill    RADIOGRAPHS TODAY    FINDINGS:  Mild DJD.  No fracture or bone destruction identified        Assessment:       Encounter Diagnoses   Name Primary?    Left shoulder pain, unspecified chronicity Yes    Obesity, unspecified classification, unspecified obesity type, unspecified whether serious comorbidity present     Chronic left shoulder pain     Rotator cuff syndrome of left shoulder     Biceps tendinitis of left upper extremity           Plan:       1. ASES and SF-12 was filled out today in clinic.     RTC in 3 weeks with Lisette Akbar PA-C for pre-operative history and physical. Patient will not fill out ASES, SF-12 on return.    2. Patient will bring MRI CD by the office for review    3. We reviewed with Lorraine Fonseca today, the pathology and natural history of her diagnosis. We have discussed a variety of treatment options including medications, physical therapy and other alternative treatments. I also explained the indications, risks and benefits of surgery. After discussion, Lorraine Fonseca decided to proceed with surgery. The decision was made to go forward with   1. Left shoulder arthroscopic rotator cuff repair with subacromial decompression  2. Left shoulder arthroscopic bicep tenodesis (suprapectoral)  3. Left shoulder arthroscopic AC joint resection  4. Left shoulder Amniox arthrocentesis    The details of the surgical procedure were explained, including the location of probable incisions and a description of likely hardware and/or grafts to be used.  The patient understands the likely convalescence  after surgery.  Also, we have thoroughly discussed the risks, benefits and alternatives to surgery, including, but not limited to, the risk of infection, joint stiffness, blood clot (including DVT and/or pulmonary embolus), neurologic and vascular injury.  It was explained that, if tissue has been repaired or reconstructed, there is a chance of failure, which may require further management.      All of the patient's questions were answered and informed consent was obtained. The patient will contact us if they have any questions or concerns in the interim.    4. Pre-hab with Salima at [facility] for motion    5. Discussed smoking cessation and effect on healing                    [facility] patient questionnaires have been collected today.

## 2018-04-09 ENCOUNTER — OFFICE VISIT (OUTPATIENT)
Dept: FAMILY MEDICINE | Facility: CLINIC | Age: 53
End: 2018-04-09
Payer: MEDICAID

## 2018-04-09 ENCOUNTER — CLINICAL SUPPORT (OUTPATIENT)
Dept: REHABILITATION | Facility: HOSPITAL | Age: 53
End: 2018-04-09
Attending: ORTHOPAEDIC SURGERY
Payer: MEDICAID

## 2018-04-09 VITALS
WEIGHT: 205.25 LBS | HEART RATE: 89 BPM | DIASTOLIC BLOOD PRESSURE: 68 MMHG | OXYGEN SATURATION: 98 % | TEMPERATURE: 98 F | SYSTOLIC BLOOD PRESSURE: 124 MMHG | BODY MASS INDEX: 32.15 KG/M2

## 2018-04-09 DIAGNOSIS — R73.03 PREDIABETES: ICD-10-CM

## 2018-04-09 DIAGNOSIS — M25.612 DECREASED ROM OF LEFT SHOULDER: ICD-10-CM

## 2018-04-09 DIAGNOSIS — R30.0 DYSURIA: Primary | ICD-10-CM

## 2018-04-09 DIAGNOSIS — M25.512 LEFT SHOULDER PAIN, UNSPECIFIED CHRONICITY: Primary | ICD-10-CM

## 2018-04-09 PROCEDURE — 99999 PR PBB SHADOW E&M-EST. PATIENT-LVL III: CPT | Mod: PBBFAC,,, | Performed by: NURSE PRACTITIONER

## 2018-04-09 PROCEDURE — 97161 PT EVAL LOW COMPLEX 20 MIN: CPT | Mod: PN

## 2018-04-09 PROCEDURE — 97110 THERAPEUTIC EXERCISES: CPT | Mod: PN

## 2018-04-09 PROCEDURE — 99213 OFFICE O/P EST LOW 20 MIN: CPT | Mod: PBBFAC,PO | Performed by: NURSE PRACTITIONER

## 2018-04-09 PROCEDURE — 99214 OFFICE O/P EST MOD 30 MIN: CPT | Mod: S$PBB,,, | Performed by: NURSE PRACTITIONER

## 2018-04-09 RX ORDER — OMEPRAZOLE 20 MG/1
20 CAPSULE, DELAYED RELEASE ORAL DAILY
COMMUNITY
Start: 2018-03-08 | End: 2018-05-16 | Stop reason: ALTCHOICE

## 2018-04-09 RX ORDER — DICYCLOMINE HYDROCHLORIDE 20 MG/1
TABLET ORAL
COMMUNITY
Start: 2018-03-06 | End: 2018-04-19

## 2018-04-09 NOTE — PROGRESS NOTES
This dictation has been generated using Dragon Dictation some phonetic errors may occur.     Problem List Items Addressed This Visit     Prediabetes    Relevant Orders    Lipid panel    Hemoglobin A1c      Other Visit Diagnoses     Dysuria    -  Primary    Relevant Orders    Urinalysis          Orders Placed This Encounter    Urinalysis    Lipid panel    Hemoglobin A1c     Shoulder pain and rotator cuff tear per patient report.  She will bring the MRI films to ortho.  I've offered this patient pain med in the past however did not find any relief with hydrocodone and oxycodone.  She's never had any inappropriate use of pain meds during 15 years that I have cared for her.  She does ask about options for management of pain after surgery interested in what an aunt had some sort of injection system in the shoulder.  I've asked her to discuss that with Dr. Vann.     No Follow-up on file.    ________________________________________________________________  ________________________________________________________________      Chief Complaint   Patient presents with    Medication Refill    surgery on 4/20/2018     History of present illness  This 52 y.o. presents today for complaint of a few issues.  She asked about get some refill of meds however will call us back about which ones.  She does note some burning with urination.  She has had some urinary frequency.  Did not complete urinalysis as ordered at last visit.  Does have some menopausal symptoms so we would have to consider atrophic vaginitis contributing however check urinalysis and rule out urinary tract infection.  Prediabetic bowel labs due for update of labs.  She needs a repeat labs fasting lipid panel and A1c.  ROS:   CONST: weight stable.  EYES: no vision change.  ENT: No sore throat, headache, or earache.  CV: no chest pain w/ exertion.  RESP: No shortness of breath.  GI: no nausea, vomiting, diarrhea. No dysphagia. Appetite good.   : no urinary  issues.  MUSCULOSKELETAL: no new myalgias or arthralgias.  Continues with the left shoulder pain and decreased range of motion.  Interruption and ADLs noted.  SKIN: no new changes.  NEURO: no focal deficits. Denies headache.  PSYCH: no new issues.  ENDOCRINE: no polyuria.  HEME: no lymph nodes.  ALLERGY: no general pruritis.      Past Medical History:   Diagnosis Date    Anxiety     Back problem     Diabetes mellitus type I     no meds since weight loss few years back    General anesthetics causing adverse effect in therapeutic use     sometimes hard to sedate--    Pneumonia     Thyroid disease     thyroid nodules       Past Surgical History:   Procedure Laterality Date     SECTION  x2     and     HYSTERECTOMY      Partial abd    RHINOPLASTY TIP         Family History   Problem Relation Age of Onset    Diabetes Mother     Heart disease Mother     Hyperlipidemia Mother     Hypertension Mother        Social History     Social History    Marital status: Single     Spouse name: N/A    Number of children: N/A    Years of education: N/A     Social History Main Topics    Smoking status: Current Every Day Smoker     Packs/day: 1.00     Types: Cigarettes    Smokeless tobacco: Never Used    Alcohol use No    Drug use: No    Sexual activity: Not Asked     Other Topics Concern    None     Social History Narrative    None       Current Outpatient Prescriptions   Medication Sig Dispense Refill    buPROPion (WELLBUTRIN XL) 150 MG TB24 tablet Take 1 tablet (150 mg total) by mouth every morning. 30 tablet 3    cyclobenzaprine (FLEXERIL) 10 MG tablet Take 1 tablet (10 mg total) by mouth 3 (three) times daily as needed for Muscle spasms. 21 tablet 0    diazePAM (VALIUM) 5 MG tablet ONE (2) TABLETS 30 MINUTES BEFORE PROCEDURE AS NEEDED FOR ANXIETY. PT MAY REPEAT X ONE (1-2) DOSES IF NEEDED. 4 tablet 0    dicyclomine (BENTYL) 20 mg tablet       ESTRACE 0.01 % (0.1 mg/gram) vaginal cream        "estradiol (ESTRACE) 1 MG tablet TK 1 T PO QAM  11    ibuprofen (ADVIL,MOTRIN) 800 MG tablet Take 1 tablet (800 mg total) by mouth 3 (three) times daily as needed for Pain. 90 tablet 0    LORazepam (ATIVAN) 0.5 MG tablet TAKE 1 TABLET BY MOUTH EVERY 6 HOURS AS NEEDED 90 tablet 2    methen-m.blue-s.phos-phsal-hyo 118-10-40.8-36 mg Cap Take 1 capsule by mouth 3 (three) times daily as needed (bladder pain). 40 capsule 11    metronidazole (METROGEL) 0.75 % vaginal gel       omeprazole (PRILOSEC) 20 MG capsule Take 20 mg by mouth once daily.      oxycodone-acetaminophen (PERCOCET) 5-325 mg per tablet Take 1 tablet by mouth every 6 (six) hours as needed for Pain. 15 tablet 0    promethazine (PHENERGAN) 25 MG tablet Take 1 tablet (25 mg total) by mouth every 6 (six) hours as needed for Nausea. 60 tablet 0    sulindac (CLINORIL) 150 MG tablet Take 1 tablet (150 mg total) by mouth 2 (two) times daily. 10 tablet 0     No current facility-administered medications for this visit.        Review of patient's allergies indicates:   Allergen Reactions    Vicodin [hydrocodone-acetaminophen] Other (See Comments)     "Makes me Hyper." Doesn't help the pain.    Percocet [oxycodone-acetaminophen] Nausea Only     Can take Percocet if she takes Phenergan or Zofran.       Physical examination  Vitals Reviewed  Gen. Well-dressed well-nourished no apparent distress  Skin warm dry and intact.  No rashes noted.  HEENT.  TM intact bilateral with normal light reflex.  No mastoid tenderness during percussion.  Nares patent bilateral.  Pharynx is unremarkable.  No maxillary or frontal sinus tenderness when percussed.    Neck is supple without adenopathy  Chest.  Respirations are even unlabored.  Lungs are clear to auscultation.  Cardiac regular rate and rhythm.  No chest wall adenopathy noted.  Abdomen is soft and not distended.  Bowel sounds are present.  No tenderness during palpation of the abdomen.  No Hepatosplenomegaly noted.  No " hernia noted.  No CVA tenderness to percussion.    Neuro. Awake alert oriented x4.  Normal judgment and cognition noted.  Extremities no clubbing cyanosis or edema noted.     Call or return to clinic prn if these symptoms worsen or fail to improve as anticipated.

## 2018-04-09 NOTE — PROGRESS NOTES
"  TIME RECORD    Date: 04/09/2018    Start Time:  1130 (pt 30 minutes late to treatment session)  Stop Time:  1200    OUTPATIENT PHYSICAL THERAPY   PATIENT EVALUATION  Primary Diagnosis:   1. Left shoulder pain, unspecified chronicity     2. Decreased ROM of left shoulder       Treatment Diagnosis: decreased ROM of L shoulder, weakness of L shoulder, trunk weakness  Past Medical History:   Diagnosis Date    Anxiety     Back problem     Diabetes mellitus type I     no meds since weight loss few years back    General anesthetics causing adverse effect in therapeutic use     sometimes hard to sedate--    Pneumonia     Thyroid disease     thyroid nodules     Precautions: standard  Prior Therapy: yes  Medications: Lorraine Childers has a current medication list which includes the following prescription(s): bupropion, cyclobenzaprine, diazepam, dicyclomine, estrace, estradiol, ibuprofen, lorazepam, methen-m.blue-s.phos-phsal-hyo, metronidazole, omeprazole, oxycodone-acetaminophen, promethazine, and sulindac.  Nutrition:  Normal  History of Present Illness: Pt reporting she fell on christmas morning falling onto her LUE. PT reports that her biggest problem Is sleeping at night. Pt reporting she had MRI done at [facility] reporting "6 inch tear of the L RTC". Pt Scheduled for arthroscopic Repair 4/20/2018 with Dr. Vann.   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   PT denies paresthesia within the LUE. Pt reports that her neck " is not nearly as bad as it was the last we had seen her.       Pain:  Location: Superior anterior and posterior L shoulder   Description: Aching, Dull, Burning, Throbbing, Tight   Activities Which Increase Pain: general activity of the LUE  Activities Which Decrease Pain: rest and motrin, ice, and heat  Pain Scale: 1/10 at best 4/10 now  10/10 at worst    Objective     Observation/posture: pt in sitting with rounded shoulder, mild forward head  Shoulder   Right    Left   Pain/Dysfunction with Movement     AROM  PROM  MMT  AROM  PROM  MMT     flexion  135  4 85*  NT    Extension  60  4 34*  NT    Abduction  145  4 75*  NT    Adduction  WNL  4 0*  NT    Internal rotation  T7  4 L hip*  NT    ER at 90° abd  T3  4 L ear*  NT      *= pain throughout the L shoulder    Sensation/Reflexes: grossly intact to light touch throughout BUE  Palpation: TTP L pec major/minor, coracoid process, long head of biceps tendon, subacromial region, infraspintaus, supraspintaus, upper trap - sorness throughout deltoid, biceps, and triceps  Scapula:  LUE NT - RUE as below   Rhomboids: 4            middle trap: 4                 lower trap:4                   serratus ant: 4                 Lats: 4  Tests:    joint play   Unable to be tested secondary to pain       RTC testing:  Unable to be tested 2/2 pain  shoulder instability testing: NT  Impingement test:      Curiel-christiano test: NT 2/2 pain  labral test and biceps: NT  AC joint: NT    Elbow screen: ROM WNL  Cervical screen: ROM WNL    FOTO: 52% limitation     today's treatment x15 minutes:  education: educated in evaluation findings and POC.  Educated in expectations of treatment, provider's contact information (email and phone) given to pt for communication of any questions and concerns.  HEP instruction and ther ex: instructed and performed following:   Supine AAROM flexion using hands clasped x10   Seated self isometric shoulder ER/IR x10 3 second holds  Pt verbally understood the  instructions as they were given and demonstrated proper form and technique during therapy. Pt was advised to perform these exercises free of pain, and to stop performing them if pain occurs.       Assessment       Initial Assessment (Pertinent finding, problem list and factors affecting outcome): Ms Lorraine Paredes is a 53 y/o female referred to outpatient PT for pre-hab for the L shoulder with arthroscopic RTC repair scheduled for 4/20/18. Pt presenting with decreased ROM of the L shoulder, weakness throughout the L shoulder girdle, tenderness throughout L shoulder, and limitations with functional mobility. Pt signs and symptoms consistent with referring diagnosis likely secondary to RTC tear seen on imaging. Pt would benefit from skilled PT to address above stated problems, as well as, achieve pt goals within a timely manner. Pt has set realistic goals and has verbalized good understanding and agreement with reported diagnosis, prognosis and treatment. Pt demonstrates no additional cultural, spiritual or educational need and currently has no barriers to learning.    History  Co-morbidities and personal factors that may impact the plan of care Examination  Body Structures and Functions, activity limitations and participation restrictions that may impact the plan of care    Clinical Presentation   Co-morbidities:   high BMI        Personal Factors:   lifestyle Body Regions:   upper extremities  trunk    Body Systems:    gross symmetry  ROM  strength  gross coordinated movement  balance  gait  transfers  transitions            Participation Restrictions:   Work, domestic, recreation, community     Activity limitations:   Learning and applying knowledge  no deficits    General Tasks and Commands  no deficits    Communication  no deficits    Mobility  lifting and carrying objects    Self care  dressing    Domestic Life  shopping  cooking  doing house work (cleaning house, washing dishes,  laundry)    Interactions/Relationships  no deficits    Life Areas  no deficits    Community and Social Life  community life  recreation and leisure         stable and uncomplicated                      low   low  moderate Decision Making/ Complexity Score:  low       Rehab Potiential: good    Short Term Goals (1 Weeks):   1. Pt will demonstrate independence with HEP for ease with maintenance of ROM prior to surgery   Long Term Goals (2 Weeks):   1. Pt will report understanding and agreement of post op instructions.    Post op goals to be determined by evaluating therapist.     Plan     Certification Period: 4/9/18 to 7/9/18  Recommended Treatment Plan: 2 times per week for 2 weeks: Electrical Stimulation PRN, Iontophoresis (with dexamethasone PRN), Manual Therapy, Moist Heat/ Ice, Neuromuscular Re-ed, Patient Education, Therapeutic Activites, Therapeutic Exercise and Other therapeutic taping, dry needling, aquatic therapy    Other Recommendations: PTA to be involved in pt POC.      Therapist: Salima Engle, PT    I CERTIFY THE NEED FOR THESE SERVICES FURNISHED UNDER THIS PLAN OF TREATMENT AND WHILE UNDER MY CARE    Physician's comments: ________________________________________________________________________________________________________________________________________________      Physician's Name: ___________________________________

## 2018-04-09 NOTE — PLAN OF CARE
"  TIME RECORD    Date: 04/09/2018    Start Time:  1130 (pt 30 minutes late to treatment session)  Stop Time:  1200    OUTPATIENT PHYSICAL THERAPY   PATIENT EVALUATION  Primary Diagnosis:   1. Left shoulder pain, unspecified chronicity     2. Decreased ROM of left shoulder       Treatment Diagnosis: decreased ROM of L shoulder, weakness of L shoulder, trunk weakness  Past Medical History:   Diagnosis Date    Anxiety     Back problem     Diabetes mellitus type I     no meds since weight loss few years back    General anesthetics causing adverse effect in therapeutic use     sometimes hard to sedate--    Pneumonia     Thyroid disease     thyroid nodules     Precautions: standard  Prior Therapy: yes  Medications: Lorraine Childers has a current medication list which includes the following prescription(s): bupropion, cyclobenzaprine, diazepam, dicyclomine, estrace, estradiol, ibuprofen, lorazepam, methen-m.blue-s.phos-phsal-hyo, metronidazole, omeprazole, oxycodone-acetaminophen, promethazine, and sulindac.  Nutrition:  Normal  History of Present Illness: Pt reporting she fell on christmas morning falling onto her LUE. PT reports that her biggest problem Is sleeping at night. Pt reporting she had MRI done at [facility] reporting "6 inch tear of the L RTC". Pt Scheduled for arthroscopic Repair 4/20/2018 with Dr. Vann.   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   PT denies paresthesia within the LUE. Pt reports that her neck " is not nearly as bad as it was the last we had seen her.       Pain:  Location: Superior anterior and posterior L shoulder   Description: Aching, Dull, Burning, Throbbing, Tight   Activities Which Increase Pain: general activity of the LUE  Activities Which Decrease Pain: rest and motrin, ice, and heat  Pain Scale: 1/10 at best 4/10 now  10/10 at worst    Objective     Observation/posture: pt in sitting with rounded shoulder, mild forward head  Shoulder   Right    Left   Pain/Dysfunction with Movement     AROM  PROM  MMT  AROM  PROM  MMT     flexion  135  4 85*  NT    Extension  60  4 34*  NT    Abduction  145  4 75*  NT    Adduction  WNL  4 0*  NT    Internal rotation  T7  4 L hip*  NT    ER at 90° abd  T3  4 L ear*  NT      *= pain throughout the L shoulder    Sensation/Reflexes: grossly intact to light touch throughout BUE  Palpation: TTP L pec major/minor, coracoid process, long head of biceps tendon, subacromial region, infraspintaus, supraspintaus, upper trap - sorness throughout deltoid, biceps, and triceps  Scapula:  LUE NT - RUE as below   Rhomboids: 4            middle trap: 4                 lower trap:4                   serratus ant: 4                 Lats: 4  Tests:    joint play   Unable to be tested secondary to pain       RTC testing:  Unable to be tested 2/2 pain  shoulder instability testing: NT  Impingement test:      Curiel-christiano test: NT 2/2 pain  labral test and biceps: NT  AC joint: NT    Elbow screen: ROM WNL  Cervical screen: ROM WNL    FOTO: 52% limitation     today's treatment x15 minutes:  education: educated in evaluation findings and POC.  Educated in expectations of treatment, provider's contact information (email and phone) given to pt for communication of any questions and concerns.  HEP instruction and ther ex: instructed and performed following:   Supine AAROM flexion using hands clasped x10   Seated self isometric shoulder ER/IR x10 3 second holds  Pt verbally understood the  instructions as they were given and demonstrated proper form and technique during therapy. Pt was advised to perform these exercises free of pain, and to stop performing them if pain occurs.       Assessment       Initial Assessment (Pertinent finding, problem list and factors affecting outcome): Ms Lorraine Paredes is a 51 y/o female referred to outpatient PT for pre-hab for the L shoulder with arthroscopic RTC repair scheduled for 4/20/18. Pt presenting with decreased ROM of the L shoulder, weakness throughout the L shoulder girdle, tenderness throughout L shoulder, and limitations with functional mobility. Pt signs and symptoms consistent with referring diagnosis likely secondary to RTC tear seen on imaging. Pt would benefit from skilled PT to address above stated problems, as well as, achieve pt goals within a timely manner. Pt has set realistic goals and has verbalized good understanding and agreement with reported diagnosis, prognosis and treatment. Pt demonstrates no additional cultural, spiritual or educational need and currently has no barriers to learning.    History  Co-morbidities and personal factors that may impact the plan of care Examination  Body Structures and Functions, activity limitations and participation restrictions that may impact the plan of care    Clinical Presentation   Co-morbidities:   high BMI        Personal Factors:   lifestyle Body Regions:   upper extremities  trunk    Body Systems:    gross symmetry  ROM  strength  gross coordinated movement  balance  gait  transfers  transitions            Participation Restrictions:   Work, domestic, recreation, community     Activity limitations:   Learning and applying knowledge  no deficits    General Tasks and Commands  no deficits    Communication  no deficits    Mobility  lifting and carrying objects    Self care  dressing    Domestic Life  shopping  cooking  doing house work (cleaning house, washing dishes,  laundry)    Interactions/Relationships  no deficits    Life Areas  no deficits    Community and Social Life  community life  recreation and leisure         stable and uncomplicated                      low   low  moderate Decision Making/ Complexity Score:  low       Rehab Potiential: good    Short Term Goals (1 Weeks):   1. Pt will demonstrate independence with HEP for ease with maintenance of ROM prior to surgery   Long Term Goals (2 Weeks):   1. Pt will report understanding and agreement of post op instructions.    Post op goals to be determined by evaluating therapist.     Plan     Certification Period: 4/9/18 to 7/9/18  Recommended Treatment Plan: 2 times per week for 2 weeks: Electrical Stimulation PRN, Iontophoresis (with dexamethasone PRN), Manual Therapy, Moist Heat/ Ice, Neuromuscular Re-ed, Patient Education, Therapeutic Activites, Therapeutic Exercise and Other therapeutic taping, dry needling, aquatic therapy    Other Recommendations: PTA to be involved in pt POC.      Therapist: Salima Engle, PT    I CERTIFY THE NEED FOR THESE SERVICES FURNISHED UNDER THIS PLAN OF TREATMENT AND WHILE UNDER MY CARE    Physician's comments: ________________________________________________________________________________________________________________________________________________      Physician's Name: ___________________________________

## 2018-04-10 ENCOUNTER — TELEPHONE (OUTPATIENT)
Dept: FAMILY MEDICINE | Facility: CLINIC | Age: 53
End: 2018-04-10

## 2018-04-10 ENCOUNTER — LAB VISIT (OUTPATIENT)
Dept: LAB | Facility: HOSPITAL | Age: 53
End: 2018-04-10
Attending: NURSE PRACTITIONER
Payer: MEDICAID

## 2018-04-10 DIAGNOSIS — R30.0 DYSURIA: ICD-10-CM

## 2018-04-10 LAB
BACTERIA #/AREA URNS AUTO: NORMAL /HPF
BILIRUB UR QL STRIP: NEGATIVE
CLARITY UR REFRACT.AUTO: CLEAR
COLOR UR AUTO: YELLOW
GLUCOSE UR QL STRIP: NEGATIVE
HGB UR QL STRIP: NEGATIVE
KETONES UR QL STRIP: NEGATIVE
LEUKOCYTE ESTERASE UR QL STRIP: ABNORMAL
MICROSCOPIC COMMENT: NORMAL
NITRITE UR QL STRIP: NEGATIVE
PH UR STRIP: 7 [PH] (ref 5–8)
PROT UR QL STRIP: NEGATIVE
SP GR UR STRIP: 1 (ref 1–1.03)
SQUAMOUS #/AREA URNS AUTO: 3 /HPF
URN SPEC COLLECT METH UR: ABNORMAL
UROBILINOGEN UR STRIP-ACNC: NEGATIVE EU/DL
WBC #/AREA URNS AUTO: 5 /HPF (ref 0–5)

## 2018-04-10 PROCEDURE — 81001 URINALYSIS AUTO W/SCOPE: CPT

## 2018-04-10 RX ORDER — ATORVASTATIN CALCIUM 40 MG/1
40 TABLET, FILM COATED ORAL DAILY
Qty: 90 TABLET | Refills: 3 | Status: SHIPPED | OUTPATIENT
Start: 2018-04-10 | End: 2018-07-13 | Stop reason: SDUPTHER

## 2018-04-10 NOTE — TELEPHONE ENCOUNTER
Call pt. Her cholesterol is elevated. She needs to be on a statin cholesterol med and stop smoking!  Med sent to pharmacy. Recheck labs 3 months  No DM  Urine test normal

## 2018-04-13 ENCOUNTER — OFFICE VISIT (OUTPATIENT)
Dept: SPORTS MEDICINE | Facility: CLINIC | Age: 53
End: 2018-04-13
Payer: MEDICAID

## 2018-04-13 VITALS
HEIGHT: 67 IN | BODY MASS INDEX: 32.18 KG/M2 | HEART RATE: 92 BPM | SYSTOLIC BLOOD PRESSURE: 117 MMHG | DIASTOLIC BLOOD PRESSURE: 76 MMHG | WEIGHT: 205 LBS

## 2018-04-13 DIAGNOSIS — M19.012 ARTHRITIS OF LEFT ACROMIOCLAVICULAR JOINT: ICD-10-CM

## 2018-04-13 DIAGNOSIS — M75.102 ROTATOR CUFF SYNDROME, LEFT: Primary | ICD-10-CM

## 2018-04-13 DIAGNOSIS — M75.122 COMPLETE TEAR OF LEFT ROTATOR CUFF: ICD-10-CM

## 2018-04-13 DIAGNOSIS — M75.22 BICEPS TENDONITIS ON LEFT: ICD-10-CM

## 2018-04-13 PROCEDURE — 99214 OFFICE O/P EST MOD 30 MIN: CPT | Mod: PBBFAC,PO | Performed by: PHYSICIAN ASSISTANT

## 2018-04-13 PROCEDURE — 99999 PR PBB SHADOW E&M-EST. PATIENT-LVL IV: CPT | Mod: PBBFAC,,, | Performed by: PHYSICIAN ASSISTANT

## 2018-04-13 PROCEDURE — 99499 UNLISTED E&M SERVICE: CPT | Mod: S$PBB,,, | Performed by: PHYSICIAN ASSISTANT

## 2018-04-13 RX ORDER — OXYCODONE AND ACETAMINOPHEN 10; 325 MG/1; MG/1
1 TABLET ORAL
Qty: 30 TABLET | Refills: 0 | Status: SHIPPED | OUTPATIENT
Start: 2018-04-13 | End: 2018-05-02 | Stop reason: SDUPTHER

## 2018-04-13 RX ORDER — PROMETHAZINE HYDROCHLORIDE 25 MG/1
25 TABLET ORAL EVERY 6 HOURS PRN
Qty: 30 TABLET | Refills: 0 | Status: SHIPPED | OUTPATIENT
Start: 2018-04-13 | End: 2018-05-02 | Stop reason: SDUPTHER

## 2018-04-13 RX ORDER — CELECOXIB 200 MG/1
200 CAPSULE ORAL 2 TIMES DAILY
Qty: 60 CAPSULE | Refills: 1 | Status: ON HOLD | OUTPATIENT
Start: 2018-04-13 | End: 2018-10-29 | Stop reason: CLARIF

## 2018-04-13 NOTE — H&P
Lorraine Childers  is here for a completion of her perioperative paperwork. she  Is scheduled to undergo 1. Left shoulder arthroscopic rotator cuff repair with subacromial decompression  2. Left shoulder arthroscopic bicep tenodesis (suprapectoral)  3. Left shoulder arthroscopic AC joint resection  4. Left shoulder Amniox arthrocentesis on 2018.  She is a healthy individual and does not need clearance for this procedure.     Risks, indications and benefits of the surgical procedure were discussed with the patient. All questions with regard to surgery, rehab, expected return to functional activities, activities of daily living and recreational endeavors were answered to her satisfaction.    Patient was informed and understands the risks of surgery are greater for patients with a current condition or history of heart disease, obesity, clotting disorders, recurrent infections, steroid use, current or past smoking, and factors such as sedentary lifestyle and noncompliance with medications, therapy or follow-up. The degree of the increased risk is hard to estimate with any degree of precision.    Once no other questions were asked, a brief history and physical exam was then performed.    PAST MEDICAL HISTORY:   Past Medical History:   Diagnosis Date    Anxiety     Back problem     Diabetes mellitus type I     no meds since weight loss few years back    General anesthetics causing adverse effect in therapeutic use     sometimes hard to sedate--    Pneumonia     Thyroid disease     thyroid nodules     PAST SURGICAL HISTORY:   Past Surgical History:   Procedure Laterality Date     SECTION  x2     and     HYSTERECTOMY      Partial abd    RHINOPLASTY TIP       FAMILY HISTORY:   Family History   Problem Relation Age of Onset    Diabetes Mother     Heart disease Mother     Hyperlipidemia Mother     Hypertension Mother      SOCIAL HISTORY:   Social History     Social History    Marital  status: Single     Spouse name: N/A    Number of children: N/A    Years of education: N/A     Occupational History    Not on file.     Social History Main Topics    Smoking status: Current Every Day Smoker     Packs/day: 1.00     Types: Cigarettes    Smokeless tobacco: Never Used    Alcohol use No    Drug use: No    Sexual activity: Not on file     Other Topics Concern    Not on file     Social History Narrative    No narrative on file       MEDICATIONS:   Current Outpatient Prescriptions:     atorvastatin (LIPITOR) 40 MG tablet, Take 1 tablet (40 mg total) by mouth once daily., Disp: 90 tablet, Rfl: 3    buPROPion (WELLBUTRIN XL) 150 MG TB24 tablet, Take 1 tablet (150 mg total) by mouth every morning., Disp: 30 tablet, Rfl: 3    cyclobenzaprine (FLEXERIL) 10 MG tablet, Take 1 tablet (10 mg total) by mouth 3 (three) times daily as needed for Muscle spasms., Disp: 21 tablet, Rfl: 0    diazePAM (VALIUM) 5 MG tablet, ONE (2) TABLETS 30 MINUTES BEFORE PROCEDURE AS NEEDED FOR ANXIETY. PT MAY REPEAT X ONE (1-2) DOSES IF NEEDED., Disp: 4 tablet, Rfl: 0    dicyclomine (BENTYL) 20 mg tablet, , Disp: , Rfl:     ESTRACE 0.01 % (0.1 mg/gram) vaginal cream, , Disp: , Rfl:     estradiol (ESTRACE) 1 MG tablet, TK 1 T PO QAM, Disp: , Rfl: 11    ibuprofen (ADVIL,MOTRIN) 800 MG tablet, Take 1 tablet (800 mg total) by mouth 3 (three) times daily as needed for Pain., Disp: 90 tablet, Rfl: 0    LORazepam (ATIVAN) 0.5 MG tablet, TAKE 1 TABLET BY MOUTH EVERY 6 HOURS AS NEEDED, Disp: 90 tablet, Rfl: 2    methen-m.blue-s.phos-phsal-hyo 118-10-40.8-36 mg Cap, Take 1 capsule by mouth 3 (three) times daily as needed (bladder pain)., Disp: 40 capsule, Rfl: 11    metronidazole (METROGEL) 0.75 % vaginal gel, , Disp: , Rfl:     omeprazole (PRILOSEC) 20 MG capsule, Take 20 mg by mouth once daily., Disp: , Rfl:     oxycodone-acetaminophen (PERCOCET) 5-325 mg per tablet, Take 1 tablet by mouth every 6 (six) hours as needed for  "Pain., Disp: 15 tablet, Rfl: 0    promethazine (PHENERGAN) 25 MG tablet, Take 1 tablet (25 mg total) by mouth every 6 (six) hours as needed for Nausea., Disp: 60 tablet, Rfl: 0    sulindac (CLINORIL) 150 MG tablet, Take 1 tablet (150 mg total) by mouth 2 (two) times daily., Disp: 10 tablet, Rfl: 0  ALLERGIES:   Review of patient's allergies indicates:   Allergen Reactions    Vicodin [hydrocodone-acetaminophen] Other (See Comments)     "Makes me Hyper." Doesn't help the pain.    Percocet [oxycodone-acetaminophen] Nausea Only     Can take Percocet if she takes Phenergan or Zofran.       Review of Systems   Constitution: Negative. Negative for chills, fever and night sweats.   HENT: Negative for congestion and headaches.    Eyes: Negative for blurred vision, left vision loss and right vision loss.   Cardiovascular: Negative for chest pain and syncope.   Respiratory: Negative for cough and shortness of breath.    Endocrine: Negative for polydipsia, polyphagia and polyuria.   Hematologic/Lymphatic: Negative for bleeding problem. Does not bruise/bleed easily.   Skin: Negative for dry skin, itching and rash.   Musculoskeletal: Negative for falls and muscle weakness.   Gastrointestinal: Negative for abdominal pain and bowel incontinence.   Genitourinary: Negative for bladder incontinence and nocturia.   Neurological: Negative for disturbances in coordination, loss of balance and seizures.   Psychiatric/Behavioral: Negative for depression. The patient does not have insomnia.    Allergic/Immunologic: Negative for hives and persistent infections.     PHYSICAL EXAM:  GEN: A&Ox3, WD WN NAD  HEENT: WNL  CHEST: CTAB, no W/R/R  HEART: RRR, no M/R/G   ABD: Soft, NT ND, BS x4 QUADS  MS: Refer to previous note for detailed MS exam  NEURO: CN II-XII intact       The surgical consent was then reviewed with the patient, who agreed with all the contents of the consent form and it was signed. she was then given the [facility] " packet to bring with her to [facility] for the anesthesia portion of her perioperative paperwork.     PHYSICAL THERAPY:  She was also instructed regarding physical therapy and will begin POD # 1-3 at [facility].    POST OP CARE:instructions were reviewed including care of the wound and dressing after surgery and when she can shower.     PAIN MANAGEMENT: Lorraine Childers was also given a pain management regime, which includes the TENS unit given to her by Keron Valera along with the education required for its use. She was also instructed regarding the Polar ice unit that will be in place after surgery and her postoperative pain medications.     PAIN MEDICATION:  Percocet 10/325mg 1 po q 4-6 hours prn pain  Phenergan 25 mg one p.o. q.4-6 hours p.r.n. nausea and vomiting.  Celebrex 200 mg BID    Patient denies history of seizures.     DVT prophylaxis was discussed with the patient today including risk factors for developing DVTs and history of DVTs. The patient was asked if any specific recommendations were given from the doctor/s that did pre-operative surgical clearance.      Patient was asked if they were taking or using OCP pills or devices. If they answered yes, then they were instructed to stop using OCPs at this pre-operative appointment until 2 months post-op to help prevent DVT development. They understand that there are other forms of birth control that do not involve hormones. They expressed understanding that ignoring/not following this instruction could result in a DVT which could turn into a deadly pulmonary embolism.      The patient was told that narcotic pain medications may make them drowsy and instructions were given to not sign legal documents, drive or operate heavy machinery, cars, or equipment while under the influence of narcotic medications.     As there were no other questions to be asked, she was given my business card along with Susanna Vann MD business card if she has any  questions or concerns prior to surgery or in the postop period.

## 2018-04-18 ENCOUNTER — ANESTHESIA EVENT (OUTPATIENT)
Dept: SURGERY | Facility: HOSPITAL | Age: 53
End: 2018-04-18
Payer: MEDICAID

## 2018-04-18 ENCOUNTER — DOCUMENTATION ONLY (OUTPATIENT)
Dept: REHABILITATION | Facility: HOSPITAL | Age: 53
End: 2018-04-18

## 2018-04-18 NOTE — PROGRESS NOTES
Today was this patients 4th cancel/ no show to her scheduled appointments.  Per policy patient was not called due to exceeding number of missed appointments.

## 2018-04-19 NOTE — ANESTHESIA PREPROCEDURE EVALUATION
"Pre-operative evaluation for Procedure(s) (LRB):  ARTHROSCOPY-SHOULDER (Left)  REPAIR-TENDON-BICEP (Left)  ARTHROSCOPY-SHOULDER EXCISION DISTAL CLAVICLE (Left)  DEBRIDEMENT-SHOULDER-ARTHROSCOPIC (Left)  INJECTION-STEROID; LEFT shoulder (Left)    Lorraine Childers is a 52 y.o. female tobacco abuse, anxiety    Patient Active Problem List   Diagnosis    Obesity    Prediabetes    Anxiety    Acute chest pain    Tobacco user    Pelvic pain in female    Neck pain    Chronic left shoulder pain    Posture imbalance    Muscle tightness    Rotator cuff syndrome of left shoulder    Left shoulder pain    Biceps tendinitis of left upper extremity    Decreased ROM of left shoulder    Rotator cuff syndrome, left       Review of patient's allergies indicates:   Allergen Reactions    Percocet [oxycodone-acetaminophen] Nausea Only     Can take Percocet if she takes Phenergan or Zofran.    Vicodin [hydrocodone-acetaminophen]      "Makes me Hyper." Doesn't help the pain.       No current facility-administered medications on file prior to encounter.      Current Outpatient Prescriptions on File Prior to Encounter   Medication Sig Dispense Refill    ibuprofen (ADVIL,MOTRIN) 800 MG tablet Take 1 tablet (800 mg total) by mouth 3 (three) times daily as needed for Pain. 90 tablet 0    LORazepam (ATIVAN) 0.5 MG tablet TAKE 1 TABLET BY MOUTH EVERY 6 HOURS AS NEEDED (Patient taking differently: TAKE 1 TABLET BY MOUTH EVERY BID) 90 tablet 2       Past Surgical History:   Procedure Laterality Date     SECTION  x2     and     HYSTERECTOMY      Partial abd    RHINOPLASTY TIP         Social History     Social History    Marital status: Single     Spouse name: N/A    Number of children: N/A    Years of education: N/A     Occupational History    Not on file.     Social History Main Topics    Smoking status: Current Every Day Smoker     Packs/day: 1.00     Types: Cigarettes    Smokeless tobacco: Never Used "    Alcohol use No    Drug use: No    Sexual activity: Not on file     Other Topics Concern    Not on file     Social History Narrative    No narrative on file         CBC: No results for input(s): WBC, RBC, HGB, HCT, PLT, MCV, MCH, MCHC in the last 72 hours.    CMP: No results for input(s): NA, K, CL, CO2, BUN, CREATININE, GLU, MG, PHOS, CALCIUM, ALBUMIN, PROT, ALKPHOS, ALT, AST, BILITOT in the last 72 hours.    INR  No results for input(s): PT, INR, PROTIME, APTT in the last 72 hours.        Diagnostic Studies:      E[facility] Echo:  No results found for this or any previous visit.

## 2018-04-19 NOTE — PRE-PROCEDURE INSTRUCTIONS
"Pt called and informed that she is scheduled to have "Regional anesthesia" or a "block" and General anesthesia for her surgery. Allowed time to ask questions. Questions answered.  "

## 2018-04-19 NOTE — PRE-PROCEDURE INSTRUCTIONS
Preop instructions: NPO after midnight, shower instructions, directions, leave all valuables at home, medication instructions for PM prior & am of procedure explained. Patient stated an understanding.     Patient denies any side effects or issues with anesthesia or sedation. Does report high tolerance for pain meds/ concerned about post op pain control

## 2018-04-20 ENCOUNTER — HOSPITAL ENCOUNTER (OUTPATIENT)
Facility: HOSPITAL | Age: 53
Discharge: HOME | End: 2018-04-20
Attending: ORTHOPAEDIC SURGERY | Admitting: ORTHOPAEDIC SURGERY
Payer: MEDICAID

## 2018-04-20 ENCOUNTER — SURGERY (OUTPATIENT)
Age: 53
End: 2018-04-20

## 2018-04-20 ENCOUNTER — ANESTHESIA (OUTPATIENT)
Dept: SURGERY | Facility: HOSPITAL | Age: 53
End: 2018-04-20
Payer: MEDICAID

## 2018-04-20 VITALS
RESPIRATION RATE: 16 BRPM | BODY MASS INDEX: 32.18 KG/M2 | WEIGHT: 205 LBS | TEMPERATURE: 97 F | HEIGHT: 67 IN | OXYGEN SATURATION: 99 % | HEART RATE: 64 BPM | DIASTOLIC BLOOD PRESSURE: 68 MMHG | SYSTOLIC BLOOD PRESSURE: 106 MMHG

## 2018-04-20 DIAGNOSIS — M75.122 COMPLETE TEAR OF LEFT ROTATOR CUFF: ICD-10-CM

## 2018-04-20 DIAGNOSIS — M75.102 ROTATOR CUFF SYNDROME OF LEFT SHOULDER: Primary | ICD-10-CM

## 2018-04-20 DIAGNOSIS — M75.102 ROTATOR CUFF SYNDROME, LEFT: ICD-10-CM

## 2018-04-20 DIAGNOSIS — M19.012 ARTHRITIS OF LEFT ACROMIOCLAVICULAR JOINT: ICD-10-CM

## 2018-04-20 DIAGNOSIS — M75.22 BICEPS TENDONITIS ON LEFT: ICD-10-CM

## 2018-04-20 LAB — POCT GLUCOSE: 158 MG/DL (ref 70–110)

## 2018-04-20 PROCEDURE — 01630 ANES OPN/ARTHR PX SHO JT NOS: CPT | Performed by: ORTHOPAEDIC SURGERY

## 2018-04-20 PROCEDURE — 29824 SHO ARTHRS SRG DSTL CLAVICLC: CPT | Mod: 51,LT,, | Performed by: ORTHOPAEDIC SURGERY

## 2018-04-20 PROCEDURE — 71000033 HC RECOVERY, INTIAL HOUR: Performed by: ORTHOPAEDIC SURGERY

## 2018-04-20 PROCEDURE — 63600175 PHARM REV CODE 636 W HCPCS: Performed by: PHYSICIAN ASSISTANT

## 2018-04-20 PROCEDURE — 63600175 PHARM REV CODE 636 W HCPCS: Performed by: NURSE ANESTHETIST, CERTIFIED REGISTERED

## 2018-04-20 PROCEDURE — C1889 IMPLANT/INSERT DEVICE, NOC: HCPCS | Performed by: ORTHOPAEDIC SURGERY

## 2018-04-20 PROCEDURE — 71000015 HC POSTOP RECOV 1ST HR: Performed by: ORTHOPAEDIC SURGERY

## 2018-04-20 PROCEDURE — 17999 UNLISTD PX SKN MUC MEMB SUBQ: CPT | Mod: ,,, | Performed by: ORTHOPAEDIC SURGERY

## 2018-04-20 PROCEDURE — 63600175 PHARM REV CODE 636 W HCPCS: Performed by: STUDENT IN AN ORGANIZED HEALTH CARE EDUCATION/TRAINING PROGRAM

## 2018-04-20 PROCEDURE — V2790 AMNIOTIC MEMBRANE: HCPCS | Performed by: ORTHOPAEDIC SURGERY

## 2018-04-20 PROCEDURE — 25000003 PHARM REV CODE 250: Performed by: NURSE ANESTHETIST, CERTIFIED REGISTERED

## 2018-04-20 PROCEDURE — 37000009 HC ANESTHESIA EA ADD 15 MINS: Performed by: ORTHOPAEDIC SURGERY

## 2018-04-20 PROCEDURE — D9220A PRA ANESTHESIA: Mod: ANES,,, | Performed by: ANESTHESIOLOGY

## 2018-04-20 PROCEDURE — 29828 SHO ARTHRS SRG BICP TENODSIS: CPT | Mod: 51,LT,, | Performed by: ORTHOPAEDIC SURGERY

## 2018-04-20 PROCEDURE — 27201423 OPTIME MED/SURG SUP & DEVICES STERILE SUPPLY: Performed by: ORTHOPAEDIC SURGERY

## 2018-04-20 PROCEDURE — 29826 SHO ARTHRS SRG DECOMPRESSION: CPT | Mod: LT,,, | Performed by: ORTHOPAEDIC SURGERY

## 2018-04-20 PROCEDURE — 25000003 PHARM REV CODE 250: Performed by: ORTHOPAEDIC SURGERY

## 2018-04-20 PROCEDURE — 25000003 PHARM REV CODE 250: Performed by: PHYSICIAN ASSISTANT

## 2018-04-20 PROCEDURE — 63600175 PHARM REV CODE 636 W HCPCS

## 2018-04-20 PROCEDURE — 29827 SHO ARTHRS SRG RT8TR CUF RPR: CPT | Mod: LT,,, | Performed by: ORTHOPAEDIC SURGERY

## 2018-04-20 PROCEDURE — C1713 ANCHOR/SCREW BN/BN,TIS/BN: HCPCS | Performed by: ORTHOPAEDIC SURGERY

## 2018-04-20 PROCEDURE — D9220A PRA ANESTHESIA: Mod: CRNA,,, | Performed by: NURSE ANESTHETIST, CERTIFIED REGISTERED

## 2018-04-20 PROCEDURE — 71000016 HC POSTOP RECOV ADDL HR: Performed by: ORTHOPAEDIC SURGERY

## 2018-04-20 PROCEDURE — 64415 NJX AA&/STRD BRCH PLXS IMG: CPT | Mod: 59,LT,, | Performed by: ANESTHESIOLOGY

## 2018-04-20 PROCEDURE — 37000008 HC ANESTHESIA 1ST 15 MINUTES: Performed by: ORTHOPAEDIC SURGERY

## 2018-04-20 PROCEDURE — 63600175 PHARM REV CODE 636 W HCPCS: Performed by: ORTHOPAEDIC SURGERY

## 2018-04-20 PROCEDURE — 99499 UNLISTED E&M SERVICE: CPT | Mod: ,,, | Performed by: PHYSICIAN ASSISTANT

## 2018-04-20 PROCEDURE — 36000711: Performed by: ORTHOPAEDIC SURGERY

## 2018-04-20 PROCEDURE — 36000710: Performed by: ORTHOPAEDIC SURGERY

## 2018-04-20 PROCEDURE — 63600175 PHARM REV CODE 636 W HCPCS: Performed by: ANESTHESIOLOGY

## 2018-04-20 PROCEDURE — 76942 ECHO GUIDE FOR BIOPSY: CPT | Performed by: ANESTHESIOLOGY

## 2018-04-20 PROCEDURE — 82962 GLUCOSE BLOOD TEST: CPT | Performed by: ORTHOPAEDIC SURGERY

## 2018-04-20 PROCEDURE — C1725 CATH, TRANSLUMIN NON-LASER: HCPCS | Performed by: ORTHOPAEDIC SURGERY

## 2018-04-20 DEVICE — IMPLANTABLE DEVICE: Type: IMPLANTABLE DEVICE | Site: SHOULDER | Status: FUNCTIONAL

## 2018-04-20 DEVICE — MATRIX REGENERATIVE CLARIX FLO: Type: IMPLANTABLE DEVICE | Site: SHOULDER | Status: FUNCTIONAL

## 2018-04-20 DEVICE — ANCHOR SUTURE TENDSIS SWL 9MM: Type: IMPLANTABLE DEVICE | Site: SHOULDER | Status: FUNCTIONAL

## 2018-04-20 DEVICE — IMPLANT ARTHSCP BIOINDUCTV MED: Type: IMPLANTABLE DEVICE | Site: SHOULDER | Status: FUNCTIONAL

## 2018-04-20 RX ORDER — GLYCOPYRROLATE 0.2 MG/ML
INJECTION INTRAMUSCULAR; INTRAVENOUS
Status: DISCONTINUED | OUTPATIENT
Start: 2018-04-20 | End: 2018-04-20

## 2018-04-20 RX ORDER — MIDAZOLAM HYDROCHLORIDE 1 MG/ML
0.5 INJECTION INTRAMUSCULAR; INTRAVENOUS EVERY 5 MIN PRN
Status: DISCONTINUED | OUTPATIENT
Start: 2018-04-20 | End: 2018-04-20 | Stop reason: HOSPADM

## 2018-04-20 RX ORDER — KETAMINE HCL IN 0.9 % NACL 50 MG/5 ML
SYRINGE (ML) INTRAVENOUS
Status: DISCONTINUED
Start: 2018-04-20 | End: 2018-04-20 | Stop reason: HOSPADM

## 2018-04-20 RX ORDER — FENTANYL CITRATE 50 UG/ML
INJECTION, SOLUTION INTRAMUSCULAR; INTRAVENOUS
Status: DISCONTINUED | OUTPATIENT
Start: 2018-04-20 | End: 2018-04-20

## 2018-04-20 RX ORDER — DIPHENHYDRAMINE HYDROCHLORIDE 50 MG/ML
25 INJECTION INTRAMUSCULAR; INTRAVENOUS EVERY 6 HOURS PRN
Status: DISCONTINUED | OUTPATIENT
Start: 2018-04-20 | End: 2018-04-20 | Stop reason: HOSPADM

## 2018-04-20 RX ORDER — ONDANSETRON 2 MG/ML
INJECTION INTRAMUSCULAR; INTRAVENOUS
Status: DISCONTINUED | OUTPATIENT
Start: 2018-04-20 | End: 2018-04-20

## 2018-04-20 RX ORDER — KETAMINE HCL IN 0.9 % NACL 50 MG/5 ML
SYRINGE (ML) INTRAVENOUS
Status: DISCONTINUED | OUTPATIENT
Start: 2018-04-20 | End: 2018-04-20

## 2018-04-20 RX ORDER — SODIUM CHLORIDE 0.9 % (FLUSH) 0.9 %
3 SYRINGE (ML) INJECTION
Status: DISCONTINUED | OUTPATIENT
Start: 2018-04-20 | End: 2018-04-20 | Stop reason: HOSPADM

## 2018-04-20 RX ORDER — MEPERIDINE HYDROCHLORIDE 50 MG/ML
12.5 INJECTION INTRAMUSCULAR; INTRAVENOUS; SUBCUTANEOUS ONCE AS NEEDED
Status: DISCONTINUED | OUTPATIENT
Start: 2018-04-20 | End: 2018-04-20 | Stop reason: HOSPADM

## 2018-04-20 RX ORDER — EPINEPHRINE 1 MG/ML
INJECTION INTRAMUSCULAR; INTRAVENOUS; SUBCUTANEOUS
Status: DISCONTINUED
Start: 2018-04-20 | End: 2018-04-20 | Stop reason: HOSPADM

## 2018-04-20 RX ORDER — MIDAZOLAM HYDROCHLORIDE 1 MG/ML
INJECTION, SOLUTION INTRAMUSCULAR; INTRAVENOUS
Status: DISCONTINUED | OUTPATIENT
Start: 2018-04-20 | End: 2018-04-20

## 2018-04-20 RX ORDER — ROCURONIUM BROMIDE 10 MG/ML
INJECTION, SOLUTION INTRAVENOUS
Status: DISCONTINUED | OUTPATIENT
Start: 2018-04-20 | End: 2018-04-20

## 2018-04-20 RX ORDER — ONDANSETRON 2 MG/ML
INJECTION INTRAMUSCULAR; INTRAVENOUS
Status: COMPLETED
Start: 2018-04-20 | End: 2018-04-20

## 2018-04-20 RX ORDER — CEFAZOLIN SODIUM 1 G/3ML
2 INJECTION, POWDER, FOR SOLUTION INTRAMUSCULAR; INTRAVENOUS
Status: DISCONTINUED | OUTPATIENT
Start: 2018-04-20 | End: 2018-04-20 | Stop reason: HOSPADM

## 2018-04-20 RX ORDER — NEOSTIGMINE METHYLSULFATE 1 MG/ML
INJECTION, SOLUTION INTRAVENOUS
Status: DISCONTINUED | OUTPATIENT
Start: 2018-04-20 | End: 2018-04-20

## 2018-04-20 RX ORDER — PHENYLEPHRINE HYDROCHLORIDE 10 MG/ML
INJECTION INTRAVENOUS
Status: DISCONTINUED | OUTPATIENT
Start: 2018-04-20 | End: 2018-04-20

## 2018-04-20 RX ORDER — FENTANYL CITRATE 50 UG/ML
25 INJECTION, SOLUTION INTRAMUSCULAR; INTRAVENOUS EVERY 5 MIN PRN
Status: DISCONTINUED | OUTPATIENT
Start: 2018-04-20 | End: 2018-04-20 | Stop reason: HOSPADM

## 2018-04-20 RX ORDER — DEXAMETHASONE SODIUM PHOSPHATE 4 MG/ML
INJECTION, SOLUTION INTRA-ARTICULAR; INTRALESIONAL; INTRAMUSCULAR; INTRAVENOUS; SOFT TISSUE
Status: DISCONTINUED | OUTPATIENT
Start: 2018-04-20 | End: 2018-04-20

## 2018-04-20 RX ORDER — HYDROMORPHONE HYDROCHLORIDE 1 MG/ML
0.2 INJECTION, SOLUTION INTRAMUSCULAR; INTRAVENOUS; SUBCUTANEOUS EVERY 5 MIN PRN
Status: DISCONTINUED | OUTPATIENT
Start: 2018-04-20 | End: 2018-04-20 | Stop reason: HOSPADM

## 2018-04-20 RX ORDER — ONDANSETRON 2 MG/ML
4 INJECTION INTRAMUSCULAR; INTRAVENOUS ONCE AS NEEDED
Status: COMPLETED | OUTPATIENT
Start: 2018-04-20 | End: 2018-04-20

## 2018-04-20 RX ORDER — LIDOCAINE HYDROCHLORIDE 10 MG/ML
1 INJECTION, SOLUTION EPIDURAL; INFILTRATION; INTRACAUDAL; PERINEURAL ONCE
Status: COMPLETED | OUTPATIENT
Start: 2018-04-20 | End: 2018-04-20

## 2018-04-20 RX ORDER — SODIUM CHLORIDE 9 MG/ML
INJECTION, SOLUTION INTRAVENOUS CONTINUOUS
Status: DISCONTINUED | OUTPATIENT
Start: 2018-04-20 | End: 2018-04-20 | Stop reason: HOSPADM

## 2018-04-20 RX ORDER — PROPOFOL 10 MG/ML
VIAL (ML) INTRAVENOUS
Status: DISCONTINUED | OUTPATIENT
Start: 2018-04-20 | End: 2018-04-20

## 2018-04-20 RX ADMIN — ROPIVACAINE HYDROCHLORIDE: 5 INJECTION, SOLUTION EPIDURAL; INFILTRATION; PERINEURAL at 11:04

## 2018-04-20 RX ADMIN — GLYCOPYRROLATE 0.2 MG: 0.2 INJECTION INTRAMUSCULAR; INTRAVENOUS at 11:04

## 2018-04-20 RX ADMIN — Medication 25 MG: at 10:04

## 2018-04-20 RX ADMIN — MIDAZOLAM HYDROCHLORIDE 2 MG: 1 INJECTION, SOLUTION INTRAMUSCULAR; INTRAVENOUS at 10:04

## 2018-04-20 RX ADMIN — PROMETHAZINE HYDROCHLORIDE 12.5 MG: 25 INJECTION INTRAMUSCULAR; INTRAVENOUS at 03:04

## 2018-04-20 RX ADMIN — ROCURONIUM BROMIDE 50 MG: 10 INJECTION, SOLUTION INTRAVENOUS at 10:04

## 2018-04-20 RX ADMIN — PHENYLEPHRINE HYDROCHLORIDE 100 MCG: 10 INJECTION INTRAVENOUS at 11:04

## 2018-04-20 RX ADMIN — NEOSTIGMINE METHYLSULFATE 5 MG: 1 INJECTION INTRAVENOUS at 12:04

## 2018-04-20 RX ADMIN — ONDANSETRON 4 MG: 2 INJECTION INTRAMUSCULAR; INTRAVENOUS at 01:04

## 2018-04-20 RX ADMIN — SODIUM CHLORIDE: 0.9 INJECTION, SOLUTION INTRAVENOUS at 07:04

## 2018-04-20 RX ADMIN — FENTANYL CITRATE 50 MCG: 50 INJECTION, SOLUTION INTRAMUSCULAR; INTRAVENOUS at 08:04

## 2018-04-20 RX ADMIN — FENTANYL CITRATE 150 MCG: 50 INJECTION, SOLUTION INTRAMUSCULAR; INTRAVENOUS at 10:04

## 2018-04-20 RX ADMIN — PHENYLEPHRINE HYDROCHLORIDE 200 MCG: 10 INJECTION INTRAVENOUS at 12:04

## 2018-04-20 RX ADMIN — PHENYLEPHRINE HYDROCHLORIDE 100 MCG: 10 INJECTION INTRAVENOUS at 12:04

## 2018-04-20 RX ADMIN — LIDOCAINE HYDROCHLORIDE 0.2 MG: 10 INJECTION, SOLUTION EPIDURAL; INFILTRATION; INTRACAUDAL; PERINEURAL at 07:04

## 2018-04-20 RX ADMIN — PROPOFOL 150 MG: 10 INJECTION, EMULSION INTRAVENOUS at 10:04

## 2018-04-20 RX ADMIN — PHENYLEPHRINE HYDROCHLORIDE 200 MCG: 10 INJECTION INTRAVENOUS at 10:04

## 2018-04-20 RX ADMIN — PHENYLEPHRINE HYDROCHLORIDE 0.5 MCG/KG/MIN: 10 INJECTION INTRAVENOUS at 11:04

## 2018-04-20 RX ADMIN — SODIUM CHLORIDE, SODIUM GLUCONATE, SODIUM ACETATE, POTASSIUM CHLORIDE, MAGNESIUM CHLORIDE, SODIUM PHOSPHATE, DIBASIC, AND POTASSIUM PHOSPHATE: .53; .5; .37; .037; .03; .012; .00082 INJECTION, SOLUTION INTRAVENOUS at 10:04

## 2018-04-20 RX ADMIN — FENTANYL CITRATE 25 MCG: 50 INJECTION, SOLUTION INTRAMUSCULAR; INTRAVENOUS at 02:04

## 2018-04-20 RX ADMIN — CEFAZOLIN 2 G: 330 INJECTION, POWDER, FOR SOLUTION INTRAMUSCULAR; INTRAVENOUS at 10:04

## 2018-04-20 RX ADMIN — ONDANSETRON 4 MG: 2 INJECTION INTRAMUSCULAR; INTRAVENOUS at 02:04

## 2018-04-20 RX ADMIN — ONDANSETRON 4 MG: 2 INJECTION INTRAMUSCULAR; INTRAVENOUS at 10:04

## 2018-04-20 RX ADMIN — DEXAMETHASONE SODIUM PHOSPHATE 8 MG: 4 INJECTION, SOLUTION INTRAMUSCULAR; INTRAVENOUS at 10:04

## 2018-04-20 RX ADMIN — MIDAZOLAM HYDROCHLORIDE 2 MG: 1 INJECTION, SOLUTION INTRAMUSCULAR; INTRAVENOUS at 08:04

## 2018-04-20 RX ADMIN — GLYCOPYRROLATE 0.6 MG: 0.2 INJECTION INTRAMUSCULAR; INTRAVENOUS at 12:04

## 2018-04-20 RX ADMIN — FENTANYL CITRATE 50 MCG: 50 INJECTION, SOLUTION INTRAMUSCULAR; INTRAVENOUS at 11:04

## 2018-04-20 RX ADMIN — PHENYLEPHRINE HYDROCHLORIDE 100 MCG: 10 INJECTION INTRAVENOUS at 10:04

## 2018-04-20 RX ADMIN — FENTANYL CITRATE 50 MCG: 50 INJECTION, SOLUTION INTRAMUSCULAR; INTRAVENOUS at 12:04

## 2018-04-20 NOTE — TRANSFER OF CARE
"Anesthesia Transfer of Care Note    Patient: Lorraine Childers    Procedure(s) Performed: Procedure(s) (LRB):  ARTHROSCOPY-SHOULDER (Left)  REPAIR-TENDON-BICEP (Left)  ARTHROSCOPY-SHOULDER EXCISION DISTAL CLAVICLE (Left)  DEBRIDEMENT-SHOULDER-ARTHROSCOPIC (Left)  INJECTION-STEROID; LEFT shoulder (Left)    Patient location: PACU    Anesthesia Type: general    Transport from OR: Transported from OR on 6-10 L/min O2 by face mask with adequate spontaneous ventilation    Post pain: adequate analgesia    Post assessment: no apparent anesthetic complications    Post vital signs: stable    Level of consciousness: awake, alert and oriented    Nausea/Vomiting: no nausea/vomiting    Complications: none    Transfer of care protocol was followed      Last vitals:   Visit Vitals  /71 (BP Location: Right arm, Patient Position: Lying)   Pulse 63   Temp 36.6 °C (97.8 °F)   Resp 14   Ht 5' 7" (1.702 m)   Wt 93 kg (205 lb)   LMP 08/29/2012   SpO2 100%   Breastfeeding? No   BMI 32.11 kg/m²     "

## 2018-04-20 NOTE — ANESTHESIA PREPROCEDURE EVALUATION
"                                                                                                             2018  Pre-operative evaluation for Procedure(s) (LRB):  ARTHROSCOPY-SHOULDER (Left)  REPAIR-TENDON-BICEP (Left)  ARTHROSCOPY-SHOULDER EXCISION DISTAL CLAVICLE (Left)  DEBRIDEMENT-SHOULDER-ARTHROSCOPIC (Left)  INJECTION-STEROID; LEFT shoulder (Left)    Lorraine Childers is a 52 y.o. female tobacco abuse, anxiety     Patient Active Problem List   Diagnosis    Obesity    Prediabetes    Anxiety    Acute chest pain    Tobacco user    Pelvic pain in female    Neck pain    Chronic left shoulder pain    Posture imbalance    Muscle tightness    Rotator cuff syndrome of left shoulder    Left shoulder pain    Biceps tendinitis of left upper extremity    Decreased ROM of left shoulder    Rotator cuff syndrome, left       Review of patient's allergies indicates:   Allergen Reactions    Percocet [oxycodone-acetaminophen] Nausea Only     Can take Percocet if she takes Phenergan or Zofran.    Vicodin [hydrocodone-acetaminophen]      "Makes me Hyper." Doesn't help the pain.       No current facility-administered medications on file prior to encounter.      Current Outpatient Prescriptions on File Prior to Encounter   Medication Sig Dispense Refill    ibuprofen (ADVIL,MOTRIN) 800 MG tablet Take 1 tablet (800 mg total) by mouth 3 (three) times daily as needed for Pain. 90 tablet 0    LORazepam (ATIVAN) 0.5 MG tablet TAKE 1 TABLET BY MOUTH EVERY 6 HOURS AS NEEDED (Patient taking differently: TAKE 1 TABLET BY MOUTH EVERY BID) 90 tablet 2       Past Surgical History:   Procedure Laterality Date     SECTION  x2     and     HYSTERECTOMY      Partial abd    RHINOPLASTY TIP         Social History     Social History    Marital status: Single     Spouse name: N/A    Number of children: N/A    Years of education: N/A     Occupational History    Not on file.     Social History Main " Topics    Smoking status: Current Every Day Smoker     Packs/day: 1.00     Types: Cigarettes    Smokeless tobacco: Never Used    Alcohol use No    Drug use: No    Sexual activity: Not on file     Other Topics Concern    Not on file     Social History Narrative    No narrative on file         CBC: No results for input(s): WBC, RBC, HGB, HCT, PLT, MCV, MCH, MCHC in the last 72 hours.    CMP: No results for input(s): NA, K, CL, CO2, BUN, CREATININE, GLU, MG, PHOS, CALCIUM, ALBUMIN, PROT, ALKPHOS, ALT, AST, BILITOT in the last 72 hours.    INR  No results for input(s): PT, INR, PROTIME, APTT in the last 72 hours.        Diagnostic Studies:      EKD Echo:  No results found for this or any previous visit.        Anesthesia Evaluation    I have reviewed the Patient Summary Reports.     I have reviewed the Medications.     Review of Systems  Anesthesia Hx:  History of prior surgery of interest to airway management or planning: Denies Family Hx of Anesthesia complications.   Denies Personal Hx of Anesthesia complications.       Physical Exam  General:  Obesity    Airway/Jaw/Neck:  Airway Findings: Mouth Opening: Normal Tongue: Normal  General Airway Assessment: Adult  Mallampati: III  TM Distance: Normal, at least 6 cm  Jaw/Neck Findings:  Neck ROM: Normal ROM      Dental:  Dental Findings: In tact   Chest/Lungs:  Chest/Lungs Findings: Clear to auscultation, Normal Respiratory Rate         Mental Status:  Mental Status Findings:  Cooperative, Alert and Oriented         Anesthesia Plan  Type of Anesthesia, risks & benefits discussed:  Anesthesia Type:  regional, general  Patient's Preference:   Intra-op Monitoring Plan: standard ASA monitors  Intra-op Monitoring Plan Comments:   Post Op Pain Control Plan: multimodal analgesia  Post Op Pain Control Plan Comments:   Induction:   IV  Beta Blocker:  Patient is not currently on a Beta-Blocker (No further documentation required).       Informed Consent: Patient  understands risks and agrees with Anesthesia plan.  Questions answered. Anesthesia consent signed with patient.  ASA Score: 3     Day of Surgery Review of History & Physical:    H&P update referred to the surgeon.         Ready For Surgery From Anesthesia Perspective.

## 2018-04-20 NOTE — OP NOTE
Shoulder Arthroscopy Procedure Note    DATE OF PROCEDURE: 4/20/2018    ATTENDING SURGEON: Surgeon(s) and Role:     * Susanna Vann MD - Primary  Assistants:  Juan Luis Smith MD-Fellow  ASIYA Arredondo-LENA-Assistant    PREOPERATIVE DIAGNOSIS:  Left shoulder   Tendinitis, Biceps M75.20 A-C Arthritis M12.9, Rotator Cuff Syndrome/Disorder  M75.100 and Tear, Rotator Cuff, Non-traumatic M75.100    POSTOPERATIVE DIAGNOSIS: Left shoulder   Tendinitis, Biceps M75.20 A-C Arthritis M12.9, Rotator Cuff Syndrome/Disorder  M75.100, Tear, Rotator Cuff, Non-traumatic M75.100 and Tendinitis, Biceps M75.20     PROCEDURES PERFORMED:  1. Left shoulder Arthroscopic rotator cuff repair CPT - 71136    2. Left shoulder Biceps tenodesis CPT - 75792    3. Left shoulder Arthroscopic distal clavicle excision CPT - 16693    4. Left shoulder Amniox Arthrocentesis, 64153    FINDINGS:  Muscle atrophy: None  Biceps tendon status:  Tenosynovitis   Rotator cuff status:   Subscapularis: normal   Supraspinatus: 3 cm partial 50 % thickness  Infraspinatus: 3 cm partial 50 % thickness    ROTATOR CUFF TEAR CHARACTERIZATION:     Medium  Hamer  No retraction  Normal tissue    ARTICULAR CARTILAGE LESION(S):  Glenoid: ICRS Grade 0      Size: none    Humeral head: ICRS Grade 0      Size: none    EXAMINATION UNDER ANESTHESIA:   Forward Flexion 180degrees  ER side 60 degrees  Abduction 90 degrees  ER in Abduction 90 degrees  IR in Abduction 30 degrees  Anterior stability 1+  Posterior stability 1+  Inferior sulcus sign side 1+  Inferior sulcus sign in ER 0        Indications For Operative Procedure: Lorraine Childers is a 52 y.o. female with persistent left shoulder pain and failure of conservative therapy. Preoperative studies revealed A-C Arthritis M12.9, Tear, Rotator Cuff, Non-traumatic M75.100 and Tendinitis, Biceps M75.20. she was noted to have problems along the anterior and superior rotator cuff structures. The patient had clinical   evidence of  weakness and pain with overhead maneuvers. MRI was obtained revealing evidence of rotator cuff damage, which was consistent with the above stated preoperative diagnoses.     Standard procedure    IMPLANTS UTILIZED:[facility] arthroscopy equipment , Beach Chair, Mitek Vapor, Arthrex Biceps Tenodesis Set, Breg Sling Shot 2 with Abduction Pillow and Trimano  [facility] arthroscopy equipment , Beach Chair, Mitek Vapor, Arthrex Biceps Tenodesis Set, Breg Sling Shot 2 with Abduction Pillow, Trimano, Arthrex 9.0 mm x 19.5 mm Biotenodesis screw  Rotation medical medium graft with soft tissue staples and 2 bone staples  Clarix AUGUSTINA 100 mg injectable material x 1    DESCRIPTION OF PROCEDURE:   The patient was brought into the Operating Room, placed in supine position. Following application of the interscalene block in the preoperative hold area, the patient underwent general anesthesia and airway stabilization in the standard fashion. The patient was given the appropriate dose of antibiotics based on body weight. Time-out was utilized to verify Left side as the operative site.     Both lower extremities were placed in comfortable position. Nonoperative arm was   carefully placed into a well padded arm youngblood with appropriate position and no pressure on the nerves. The operative arm was then examined under anesthesia revealing the findings as noted in the findings section of this dictation.    The operative shoulder was then prepped and draped in a sterile fashion with chloraprep material Traction was applied. Spinal needle was used to enter the joint posteriorly. Joint was insufflated in standard fashion with 60 mL normal saline. Needle was then pulled back into subacromial region, which was anesthetized further with 0.5% ropivacaine mixture.    A #11 blade was used to make this portal. Blunt trocar tip was inserted into the glenohumeral joint and area of concern was directly visualized demonstrating   Right shoulder was then  prepped and draped in a sterile fashion with chloraprep material Traction was applied. Spinal needle was used to enter the joint posteriorly.   Joint was insufflated in standard fashion with 60 mL normal saline. Needle was then pulled back into subacromial region, which was anesthetized further with 30 cc of 0.5% ropivacaine mixture.    A #11 blade was used to make this portal. Blunt trocar tip was inserted into the glenohumeral joint and area of concern was directly visualized demonstrating disruption of the biceps tendon with associated biceps tenosynovitis. Rotator cuff was visualized and we noted the above stated findings in the operative findings portion of this dictation. Glenohumeral surfaces demonstrated the above findings. Labral structures demonstrated a type 1 SLAP lesion with labral tearing and were treated with arthroscopic debridement and stabilization with Mitek vapor probe. The synovitic tissue was removed with an arthroscopic shaver as well. Spinal needle was used to localize an anterosuperior portal, which was filled with 7 mm ribbed cannula, and the anterolateral portal was created and filled with a 7 ribbed cannula. Labral tearing was noted superiorly and anteriorly and was debrided with shaver and vapor probe.    Attention was then turned to biceps tenodesis. A Scorpion device from the Arthrex set was used toplace a #2 FiberLoop suture to tag the biceps tendon structure. This was performed without significant difficulty through the anterosuperior portal. Sutures were then pulled into the anterolateral portal and a Mitek VAPR probe placed through the anterosuperior portal to release the biceps tendon from its normal insertion point. Attention was then turned to the subacromial region.Blunt trocar tip and sheath was removed from the glenohumeral joint space in the posterior aspect of the subacromial space. We placed the subacromial region and then used this arthroscope to visualize localization of  the lateral portal with a spinal needle. A #11 blade was used to make this portal, was filled with a 6-mm ribbed cannula. Carefully debrided back the inflamed bursal tissues along the area of concern demonstrating a delaminated an irregular tear of the supraspinatus and infraspinatus structures. We first turned our attention to the biceps tenodesis. The arthroscope was placed into lateral portal using the anterolateral portal, we removed the sheath off the intertubercular groove in the biceps tendon region. The manipulating device was then used to push the biceps tendon out of the groove anteriorly.    Spinal needle was used to localize the accessory distal anterior portal, which was created with a #11 blade. We then used the passport device from the Arthrex set and drilled the area of concern, creating a tunnel of 8.5 mm in diameter. The Arthrex swivelock 9.0 mm device was then used to grasp control of the biceps tendon structure, which was pushed into the tunnel created and the screw deployed to effectively stabilize the area. We then oversewed by passing the suture within the center of the Arthrex screw through the rotator cuff tear to augment repair.This was tied with a modified Isabella knot technique. Sutures were cut clean with a cord cutting device. We obtained pictures.     We then turned our attention to the subacromial region. We then repositioned our cannulas into the subacromial space. An accessory lateral portal was also established. Again, our examination of the subacromial space is as noted above.     We then debrided the subacromial space of adhesions and bursal inflammation that were present with a shaver and electrocautery device. The lateral, anterior and medial borders of the undersurface of the acromion were identified and cleared of soft tissue and adhesions before proceeding with the bur. The coracoacromial ligament was identified and disected free of the anterior acromion with the  electrocautery device. The acromion was then identified and marked with the bur in the lateral portal. Anatomic morphology dictated that we perform a subacromial decompression utilizing a 5.5 mm bur in a lateral to medial fashion removing the anterior spur. The anterolateral edge of the acromion was removed with a high-speed bur. . This was performed without significant difficulty. The CA ligament was released off the anterior and lateral portion of the acromion prior to acromioplasty. The remaining portion of the AC region was treated with 1 cm of the distal clavicular edge was removed along with the associated acromial edge leaving the superior and posterior ligaments intact.    We then thoroughly inspected the rotator cuff from the subacromial side.There was a clearly demonstrated rotator cuff tear with size and pattern as reported in the findings section of this note. The rotator cuff was mobilized on its superficial and deep surfaces to allow maximal placement over the anatomic footprint of the greater tuberosity. The rotator cuff was mobilized on its superficial and deep surfaces to allow maximal placement over the anatomic footprint of the greater tuberosity.     Details of the Medial Row repair:      1. arthroscopic debridement of intra-articular and extra-articular regions.     2.  We then used a widened lateral arthroscopic portal and introduced the Rotation medical device. The rotation medical cannulas were placed through the previous portals and the seven tendon staples placed to stabilize the graft. The introduction device was removed and the rotation bone staple gun was introduced and two bone staples were placed in standard fashion.      Graft Application:     We then used a widened lateral arthroscopic portal and introduced the Rotation medical device. The rotation medical cannulas were placed through the previous portals and the seven tendon staples placed to stabilize the graft. The introduction  device was removed and the rotation bone staple gun was introduced and two bone staples were placed in standard fashion.    Final debridement of all inflamed bursal tissues was performed. Fluid was extravasated from the joint. A 4-0 nylon sutures were used to close the arthroscopic portals. Amniox arthrocentesis was performed after the subacromial space was aspirated. The injection was performed with an 18 gauge spinal needle. We then injected additional 0.25% ropivacaine mixture around the shoulder with a 21-gauge needle, applied Xeroform gauze, ABD pads and Medipore tape. The patient's arm was placed in a cooling unit, sling and abduction pillow as well as sterile electrode pads peripherally around the shoulder. The patient was allowed to recover from the anesthetic, was extubated and was taken to Recovery in stable condition. At the completion of case, all instrument and sponge counts were correct.    Physical therapy:     Medium size Rotator cuff protocol   Biceps tenodesis was performed; if tenodesis was performed limit aggressive biceps flexion exercises for first 6 weeks    Start Therapy in 3-5 days    Use Sling and Abduction Pillow - For  6 weeks then discontinue pillow at 6 weeks time to protect rotator cuff repair and biceps tenodesis. Discontinue all immobilization in 6 weeks. Remove arm from sling immediately and move elbow and wrist as tolerated.    Cuff specific program:  Pendulum exercises and Codman's exercises in 3-5 days protecting rotator cuff repair for 6 weeks by avoiding active motion program until 2-3 weeks.    PASSIVE ROM: no motion for 3 weeks, ER side 45 degrees, Forward Flex 180 degrees, ABD - 90 degrees   Full AAROM/PROM starting at 4-6 weeks as tolerated     Discharge summery:  Discharge medications per EPIC discharge notes   Discharge home when stable  Follow-up as scheduled  Activity as above  Condition Stable

## 2018-04-20 NOTE — PLAN OF CARE
Pt stable and tolerating liquids.  Pt states pain is 3/10 and is tolerable.  Discharge instructions given to pt and family.  Instructed and sent home with ice machine.  Pt states she is still nauseated but has had a little relief.  Ready for discharge.

## 2018-04-20 NOTE — ANESTHESIA POSTPROCEDURE EVALUATION
"Anesthesia Post Evaluation    Patient: Lorraine Childers    Procedure(s) Performed: Procedure(s) (LRB):  ARTHROSCOPY-SHOULDER (Left)  REPAIR-TENDON-BICEP (Left)  ARTHROSCOPY-SHOULDER EXCISION DISTAL CLAVICLE (Left)  DEBRIDEMENT-SHOULDER-ARTHROSCOPIC (Left)  INJECTION-STEROID; LEFT shoulder (Left)    Final Anesthesia Type: general  Patient location during evaluation: PACU  Patient participation: Yes- Able to Participate  Level of consciousness: awake and alert  Post-procedure vital signs: reviewed and stable  Pain management: adequate  Airway patency: patent  PONV status at discharge: No PONV  Anesthetic complications: no      Cardiovascular status: blood pressure returned to baseline  Respiratory status: unassisted  Hydration status: euvolemic  Follow-up not needed.        Visit Vitals  /68 (BP Location: Right arm, Patient Position: Lying)   Pulse 64   Temp 36.2 °C (97.2 °F) (Temporal)   Resp 16   Ht 5' 7" (1.702 m)   Wt 93 kg (205 lb)   LMP 08/29/2012   SpO2 99%   Breastfeeding? No   BMI 32.11 kg/m²       Pain/Leodan Score: Pain Assessment Performed: Yes (4/20/2018  4:15 PM)  Presence of Pain: complains of pain/discomfort (4/20/2018  4:15 PM)  Pain Rating Prior to Med Admin: 8 (4/20/2018  2:45 PM)  Leodan Score: 10 (4/20/2018  4:15 PM)      "

## 2018-04-20 NOTE — BRIEF OP NOTE
Operative Note       Surgery Date: 4/20/2018     Surgeon(s) and Role:     * Susanna Vann MD - Primary     * Juan Luis Smith MD - Fellow     * Lisette Akbar PA-C    Pre-op Diagnosis:  Rotator cuff syndrome of left shoulder [M75.102]  Biceps tendinitis of left upper extremity [M75.22]  Traumatic complete tear of left rotator cuff, initial encounter [S46.012A]    Post-op Diagnosis:  Rotator cuff syndrome left shoulder, biceps tendonitis, ac joint arthropathy, bursitis    Procedure(s) (LRB):  ARTHROSCOPY-SHOULDER (Left)  REPAIR-TENDON-BICEP (Left)  ARTHROSCOPY-SHOULDER EXCISION DISTAL CLAVICLE (Left)  DEBRIDEMENT-SHOULDER-ARTHROSCOPIC (Left)  INJECTION-STEROID; LEFT shoulder (Left)    Anesthesia: General    Findings/Key Components:  Bursitis left shoulder, ac joint arthropathy, partial undersurface supraspinatus tear    Core Measure Documentation:  Were antibiotics extended? No  Was the patient administered a VTE Prophylaxis? No. Short procedure; low risk  Estimated Blood Loss: minimal           Specimens     None        Implants:   Implant Name Type Inv. Item Serial No.  Lot No. LRB No. Used   ANCHOR SUTURE TENDSIS SWL 9MM - [id number]  ANCHOR SUTURE TENDSIS SWL 9MM  [employer] [id number] Left 1   IMPLANT ARTHSCP BIOINDUCTV MED - [id number]  IMPLANT ARTHSCP BIOINDUCTV [employer]  [facility] [employer] [id number] Left 1   Tendon Anchors    [employer]  Left 1   Bone Anchors    [employer]  Left 1                        Complications: none           Disposition: PACU - hemodynamically stable.           Condition: Stable    Attestation:  I was present for the entire procedure.

## 2018-04-20 NOTE — INTERVAL H&P NOTE
The patient has been examined and the H&P has been reviewed:    I concur with the findings and no changes have occurred since H&P was written.    Anesthesia/Surgery risks, benefits and alternative options discussed and understood by patient/family.          Active Hospital Problems    Diagnosis  POA    Rotator cuff syndrome, left [M75.102]  Yes      Resolved Hospital Problems    Diagnosis Date Resolved POA   No resolved problems to display.

## 2018-04-20 NOTE — H&P (VIEW-ONLY)
Lorraine Childers  is here for a completion of her perioperative paperwork. she  Is scheduled to undergo 1. Left shoulder arthroscopic rotator cuff repair with subacromial decompression  2. Left shoulder arthroscopic bicep tenodesis (suprapectoral)  3. Left shoulder arthroscopic AC joint resection  4. Left shoulder Amniox arthrocentesis on 2018.  She is a healthy individual and does not need clearance for this procedure.     Risks, indications and benefits of the surgical procedure were discussed with the patient. All questions with regard to surgery, rehab, expected return to functional activities, activities of daily living and recreational endeavors were answered to her satisfaction.    Patient was informed and understands the risks of surgery are greater for patients with a current condition or history of heart disease, obesity, clotting disorders, recurrent infections, steroid use, current or past smoking, and factors such as sedentary lifestyle and noncompliance with medications, therapy or follow-up. The degree of the increased risk is hard to estimate with any degree of precision.    Once no other questions were asked, a brief history and physical exam was then performed.    PAST MEDICAL HISTORY:   Past Medical History:   Diagnosis Date    Anxiety     Back problem     Diabetes mellitus type I     no meds since weight loss few years back    General anesthetics causing adverse effect in therapeutic use     sometimes hard to sedate--    Pneumonia     Thyroid disease     thyroid nodules     PAST SURGICAL HISTORY:   Past Surgical History:   Procedure Laterality Date     SECTION  x2     and     HYSTERECTOMY      Partial abd    RHINOPLASTY TIP       FAMILY HISTORY:   Family History   Problem Relation Age of Onset    Diabetes Mother     Heart disease Mother     Hyperlipidemia Mother     Hypertension Mother      SOCIAL HISTORY:   Social History     Social History    Marital  status: Single     Spouse name: N/A    Number of children: N/A    Years of education: N/A     Occupational History    Not on file.     Social History Main Topics    Smoking status: Current Every Day Smoker     Packs/day: 1.00     Types: Cigarettes    Smokeless tobacco: Never Used    Alcohol use No    Drug use: No    Sexual activity: Not on file     Other Topics Concern    Not on file     Social History Narrative    No narrative on file       MEDICATIONS:   Current Outpatient Prescriptions:     atorvastatin (LIPITOR) 40 MG tablet, Take 1 tablet (40 mg total) by mouth once daily., Disp: 90 tablet, Rfl: 3    buPROPion (WELLBUTRIN XL) 150 MG TB24 tablet, Take 1 tablet (150 mg total) by mouth every morning., Disp: 30 tablet, Rfl: 3    cyclobenzaprine (FLEXERIL) 10 MG tablet, Take 1 tablet (10 mg total) by mouth 3 (three) times daily as needed for Muscle spasms., Disp: 21 tablet, Rfl: 0    diazePAM (VALIUM) 5 MG tablet, ONE (2) TABLETS 30 MINUTES BEFORE PROCEDURE AS NEEDED FOR ANXIETY. PT MAY REPEAT X ONE (1-2) DOSES IF NEEDED., Disp: 4 tablet, Rfl: 0    dicyclomine (BENTYL) 20 mg tablet, , Disp: , Rfl:     ESTRACE 0.01 % (0.1 mg/gram) vaginal cream, , Disp: , Rfl:     estradiol (ESTRACE) 1 MG tablet, TK 1 T PO QAM, Disp: , Rfl: 11    ibuprofen (ADVIL,MOTRIN) 800 MG tablet, Take 1 tablet (800 mg total) by mouth 3 (three) times daily as needed for Pain., Disp: 90 tablet, Rfl: 0    LORazepam (ATIVAN) 0.5 MG tablet, TAKE 1 TABLET BY MOUTH EVERY 6 HOURS AS NEEDED, Disp: 90 tablet, Rfl: 2    methen-m.blue-s.phos-phsal-hyo 118-10-40.8-36 mg Cap, Take 1 capsule by mouth 3 (three) times daily as needed (bladder pain)., Disp: 40 capsule, Rfl: 11    metronidazole (METROGEL) 0.75 % vaginal gel, , Disp: , Rfl:     omeprazole (PRILOSEC) 20 MG capsule, Take 20 mg by mouth once daily., Disp: , Rfl:     oxycodone-acetaminophen (PERCOCET) 5-325 mg per tablet, Take 1 tablet by mouth every 6 (six) hours as needed for  "Pain., Disp: 15 tablet, Rfl: 0    promethazine (PHENERGAN) 25 MG tablet, Take 1 tablet (25 mg total) by mouth every 6 (six) hours as needed for Nausea., Disp: 60 tablet, Rfl: 0    sulindac (CLINORIL) 150 MG tablet, Take 1 tablet (150 mg total) by mouth 2 (two) times daily., Disp: 10 tablet, Rfl: 0  ALLERGIES:   Review of patient's allergies indicates:   Allergen Reactions    Vicodin [hydrocodone-acetaminophen] Other (See Comments)     "Makes me Hyper." Doesn't help the pain.    Percocet [oxycodone-acetaminophen] Nausea Only     Can take Percocet if she takes Phenergan or Zofran.       Review of Systems   Constitution: Negative. Negative for chills, fever and night sweats.   HENT: Negative for congestion and headaches.    Eyes: Negative for blurred vision, left vision loss and right vision loss.   Cardiovascular: Negative for chest pain and syncope.   Respiratory: Negative for cough and shortness of breath.    Endocrine: Negative for polydipsia, polyphagia and polyuria.   Hematologic/Lymphatic: Negative for bleeding problem. Does not bruise/bleed easily.   Skin: Negative for dry skin, itching and rash.   Musculoskeletal: Negative for falls and muscle weakness.   Gastrointestinal: Negative for abdominal pain and bowel incontinence.   Genitourinary: Negative for bladder incontinence and nocturia.   Neurological: Negative for disturbances in coordination, loss of balance and seizures.   Psychiatric/Behavioral: Negative for depression. The patient does not have insomnia.    Allergic/Immunologic: Negative for hives and persistent infections.     PHYSICAL EXAM:  GEN: A&Ox3, WD WN NAD  HEENT: WNL  CHEST: CTAB, no W/R/R  HEART: RRR, no M/R/G   ABD: Soft, NT ND, BS x4 QUADS  MS: Refer to previous note for detailed MS exam  NEURO: CN II-XII intact       The surgical consent was then reviewed with the patient, who agreed with all the contents of the consent form and it was signed. she was then given the [facility] " packet to bring with her to [facility] for the anesthesia portion of her perioperative paperwork.     PHYSICAL THERAPY:  She was also instructed regarding physical therapy and will begin POD # 1-3 at [facility].    POST OP CARE:instructions were reviewed including care of the wound and dressing after surgery and when she can shower.     PAIN MANAGEMENT: Lorraine Childers was also given a pain management regime, which includes the TENS unit given to her by Keron Valera along with the education required for its use. She was also instructed regarding the Polar ice unit that will be in place after surgery and her postoperative pain medications.     PAIN MEDICATION:  Percocet 10/325mg 1 po q 4-6 hours prn pain  Phenergan 25 mg one p.o. q.4-6 hours p.r.n. nausea and vomiting.  Celebrex 200 mg BID    Patient denies history of seizures.     DVT prophylaxis was discussed with the patient today including risk factors for developing DVTs and history of DVTs. The patient was asked if any specific recommendations were given from the doctor/s that did pre-operative surgical clearance.      Patient was asked if they were taking or using OCP pills or devices. If they answered yes, then they were instructed to stop using OCPs at this pre-operative appointment until 2 months post-op to help prevent DVT development. They understand that there are other forms of birth control that do not involve hormones. They expressed understanding that ignoring/not following this instruction could result in a DVT which could turn into a deadly pulmonary embolism.      The patient was told that narcotic pain medications may make them drowsy and instructions were given to not sign legal documents, drive or operate heavy machinery, cars, or equipment while under the influence of narcotic medications.     As there were no other questions to be asked, she was given my business card along with Susanna Vann MD business card if she has any  questions or concerns prior to surgery or in the postop period.

## 2018-04-20 NOTE — DISCHARGE SUMMARY
.Discharge Note  Short Stay      SUMMARY     Admit Date: 4/20/2018    Attending Physician: Susanna Vann MD     Discharge Physician: Susanna Vann MD    Final Diagnosis: same    Disposition: Home or Self Care    Patient Instructions:   Current Discharge Medication List      CONTINUE these medications which have NOT CHANGED    Details   atorvastatin (LIPITOR) 40 MG tablet Take 1 tablet (40 mg total) by mouth once daily.  Qty: 90 tablet, Refills: 3      ibuprofen (ADVIL,MOTRIN) 800 MG tablet Take 1 tablet (800 mg total) by mouth 3 (three) times daily as needed for Pain.  Qty: 90 tablet, Refills: 0      LORazepam (ATIVAN) 0.5 MG tablet TAKE 1 TABLET BY MOUTH EVERY 6 HOURS AS NEEDED  Qty: 90 tablet, Refills: 2      celecoxib (CELEBREX) 200 MG capsule Take 1 capsule (200 mg total) by mouth 2 (two) times daily.  Qty: 60 capsule, Refills: 1    Associated Diagnoses: Rotator cuff syndrome, left; Complete tear of left rotator cuff; Biceps tendonitis on left; Arthritis of left acromioclavicular joint      omeprazole (PRILOSEC) 20 MG capsule Take 20 mg by mouth once daily.      oxyCODONE-acetaminophen (PERCOCET)  mg per tablet Take 1 tablet by mouth every 4 to 6 hours as needed for Pain.  Qty: 30 tablet, Refills: 0    Associated Diagnoses: Rotator cuff syndrome, left; Complete tear of left rotator cuff; Biceps tendonitis on left; Arthritis of left acromioclavicular joint      promethazine (PHENERGAN) 25 MG tablet Take 1 tablet (25 mg total) by mouth every 6 (six) hours as needed for Nausea.  Qty: 30 tablet, Refills: 0    Associated Diagnoses: Rotator cuff syndrome, left; Complete tear of left rotator cuff; Biceps tendonitis on left; Arthritis of left acromioclavicular joint               Discharge Procedure Orders (must include Diet, Follow-up, Activity)  Activity as tolerated     Keep surgical extremity elevated     Shower on day dressing removed (No bath)     Ice to affected area     Weight bearing restrictions  (specify)   Order Comments: nwrashard LUE in sling all times     Notify your health care provider if you experience any of the following:  temperature >100.4     Notify your health care provider if you experience any of the following:  persistent nausea and vomiting or diarrhea     Notify your health care provider if you experience any of the following:  severe uncontrolled pain     Notify your health care provider if you experience any of the following:  redness, tenderness, or signs of infection (pain, swelling, redness, odor or green/yellow discharge around incision site)     Notify your health care provider if you experience any of the following:  difficulty breathing or increased cough     Remove dressing in 72 hours        F/U Dr Vann 2 weeks

## 2018-04-20 NOTE — DISCHARGE INSTRUCTIONS
[address], [place]                                    [phone]             Postoperative Instructions for Shoulder Surgery               Your Surgery Included:   Open              Arthroscopic [] Instability Repair     [] Diagnostic   [] Rotator Cuff Repair     [] Lysis of Adhesions / Manipulation [] Distal Clavicle Resection    [x] Debridement [] Biceps Tenodesis       [] Labrum  [x] Rotator Cuff   [] Cartilage   [] Contracture Release    [] SLAP Repair   [] Fracture Fixation    [] Instability Repair  [] AC Joint Reconstruction      [] Rotator Cuff Repair [] Joint Replacement     [x] Subacromial Decompression / Bursectomy   [] Hemiarthroplasty  [] Total Shoulder    [x] Biceps Tenotomy / Tenodesis    [] Reverse Total Shoulder       [x] Distal Clavicle Resection          [x] Amniox Arthrocentesis    [] Contracture Release                 Call our office ([phone]) immediately if you experience any of the following:      Excessive bleeding or pus like drainage at the incision site      Uncontrollable pain not relieved by pain medication      Excessive swelling or redness at the incision site      Fever above 101.5 degrees not controlled with Tylenol or Motrin      Shortness of Breath      Any foul odor or blistering from the surgery site   FOR EMERGENCIES: If any unusual problems or difficulties occur, call our office at [phone], or page the  at [phone] who will direct your call appropriately   1.   Pain Management: A cold therapy cuff, pain medications, local injections, and in some cases, regional anesthesia injections are used to manage your post-operative pain. The decision to use each of these options is based on their risks and benefits.    Medications: You were given one or more of the following medication prescriptions before leaving the hospital. Have the prescriptions filled at a pharmacy on your way home and follow the instructions on  the bottles. If you need a refill, please call your pharmacy.     Narcotic Medication (usually Vicodin ES, Lortab, Percocet or Nucynta): Begin taking the medication before your shoulder starts to hurt. Some patients do not like to take any medication, but if you wait until your pain is severe before taking, you will be very uncomfortable for several hours waiting for the narcotic to work. Always take with food.    Nausea / Vomiting: For this issue, we prescribe Phenergan, use this medication as directed.    Cold Therapy: You may have been sent home with a [employer] cold therapy unit and wrap for your shoulder. Fill with ice and water to the indicated fill line and use throughout the day for the first two days and then as needed to help relieve pain and control swelling.     Regional Anesthesia Injections (Blocks): You may have been given a regional nerve block either before or after surgery. This may make your entire shoulder numb for 24-36 hours.                    2.   Diet: Eat a bland diet for the first day after surgery. Progress your diet as tolerated. Constipation may occur with Narcotic usage, contact our office if you are experiencing constipation.   3.   Activity: After you arrive at home, spend most of the first 24 hours resting in bed, on the couch, or in a reclining chair. After the first 24 hours at home, slowly increase your activity level based on your symptoms.   4.   Dressing Change: Remove the dressing on the 3rd day. It is normal for some blood to be seen on the dressings. It is also normal for you to see apparent bruising on the skin around your shoulder when you remove the dressing. If present, leave the steri-strip tape across the incisions. If you are concerned by the drainage or the appearance of your shoulder, please call one of the numbers listed below.   5.   Showering: You may shower on the 3rd day after surgery with waterproof bandages over small incisions. If you have an incision  with Prineo (clear mesh), it does not need to be covered. Do not submerge in any water until after your postoperative appointment in clinic.   6.   Shoulder Sling (with/without Pillow attachment): You may have been sent home with a sling / pillow attachment holding your arm away from your body. You may remove the sling when changing clothes or bathing. Make sure to wear the sling while sleeping unless instructed otherwise. You may remove at rest or for exercises.           [x] You need to wear the sling with pillow for 24 hours a day for 6 weeks.   7.  Shoulder Exercises: Begin these exercises the first day after surgery in order to help you regain your motion and strength. You may do the following marked exercises for 2-5 mins five times a day:   [x] Shoulder shrugs - Shrug your shoulders up and down.   [x] Pendulums - Bend forward allowing your arm to hang down in front of you. Gently swing your arm side-to-side and front to back.                                                                                                                               [x] Passive abduction - Have a family member gently lift your arm away from your body bringing your elbow up to the level of your shoulder.                                                                                                                   [] Shoulder rotation - With your arm at your side, have a family member gently rotate your arm internally and externally.                                                                                                                  [x] Scapular retractions - (Squeeze shoulder blades together): Squeeze shoulder blades together while slightly pulling them down (do not shrug your shoulders upward); You can perform 10-15 reps, several times throughout the day, when seated at your desk, driving in the car, etc.                                                                                                                      [x] Pulley exercises - Put a towel or long sleeve shirt over the top of a door. Stand facing the door. Use your good arm to gently pull your operative arm up in front of you.   [x] Elbow motion - Straighten and bend your elbow.                                                                                                               [x] Ball squeezes - use ball attached to sling/pillow or soft (nerf) ball for  strengthening                                                                                                                 8.  Physical Therapy: Physical therapy is an essential component to your recovery from surgery. Your physical therapy will start in 3 days.   FIRST POSTOPERATIVE VISIT: As scheduled.

## 2018-04-22 ENCOUNTER — HOSPITAL ENCOUNTER (EMERGENCY)
Facility: HOSPITAL | Age: 53
Discharge: HOME | End: 2018-04-23
Attending: EMERGENCY MEDICINE
Payer: MEDICAID

## 2018-04-22 VITALS
BODY MASS INDEX: 32.18 KG/M2 | WEIGHT: 205 LBS | HEART RATE: 95 BPM | SYSTOLIC BLOOD PRESSURE: 166 MMHG | HEIGHT: 67 IN | DIASTOLIC BLOOD PRESSURE: 79 MMHG | RESPIRATION RATE: 20 BRPM | TEMPERATURE: 99 F | OXYGEN SATURATION: 95 %

## 2018-04-22 DIAGNOSIS — M79.604 LEG PAIN, POSTERIOR, RIGHT: ICD-10-CM

## 2018-04-22 DIAGNOSIS — R06.09 DYSPNEA ON EXERTION: Primary | ICD-10-CM

## 2018-04-22 DIAGNOSIS — R06.02 SOB (SHORTNESS OF BREATH): ICD-10-CM

## 2018-04-22 LAB
ALBUMIN SERPL BCP-MCNC: 3 G/DL
ALP SERPL-CCNC: 74 U/L
ALT SERPL W/O P-5'-P-CCNC: 17 U/L
ANION GAP SERPL CALC-SCNC: 7 MMOL/L
AST SERPL-CCNC: 19 U/L
BASOPHILS # BLD AUTO: 0.02 K/UL
BASOPHILS NFR BLD: 0.3 %
BILIRUB SERPL-MCNC: 0.2 MG/DL
BNP SERPL-MCNC: 56 PG/ML
BUN SERPL-MCNC: 15 MG/DL
CALCIUM SERPL-MCNC: 8.3 MG/DL
CHLORIDE SERPL-SCNC: 111 MMOL/L
CO2 SERPL-SCNC: 22 MMOL/L
CREAT SERPL-MCNC: 0.6 MG/DL (ref 0.5–1.4)
CREAT SERPL-MCNC: 0.7 MG/DL
DIFFERENTIAL METHOD: ABNORMAL
EOSINOPHIL # BLD AUTO: 0.2 K/UL
EOSINOPHIL NFR BLD: 3.5 %
ERYTHROCYTE [DISTWIDTH] IN BLOOD BY AUTOMATED COUNT: 14.1 %
EST. GFR  (AFRICAN AMERICAN): >60 ML/MIN/1.73 M^2
EST. GFR  (NON AFRICAN AMERICAN): >60 ML/MIN/1.73 M^2
GLUCOSE SERPL-MCNC: 103 MG/DL
HCT VFR BLD AUTO: 34.7 %
HGB BLD-MCNC: 11.6 G/DL
IMM GRANULOCYTES # BLD AUTO: 0.01 K/UL
IMM GRANULOCYTES NFR BLD AUTO: 0.2 %
INR PPP: 0.9
LYMPHOCYTES # BLD AUTO: 1.2 K/UL
LYMPHOCYTES NFR BLD: 19.2 %
MCH RBC QN AUTO: 29.9 PG
MCHC RBC AUTO-ENTMCNC: 33.4 G/DL
MCV RBC AUTO: 89 FL
MONOCYTES # BLD AUTO: 0.4 K/UL
MONOCYTES NFR BLD: 5.9 %
NEUTROPHILS # BLD AUTO: 4.4 K/UL
NEUTROPHILS NFR BLD: 70.9 %
NRBC BLD-RTO: 0 /100 WBC
PLATELET # BLD AUTO: 186 K/UL
PMV BLD AUTO: 9.5 FL
POTASSIUM SERPL-SCNC: 4.8 MMOL/L
PROT SERPL-MCNC: 6.1 G/DL
PROTHROMBIN TIME: 10 SEC
RBC # BLD AUTO: 3.88 M/UL
SAMPLE: NORMAL
SODIUM SERPL-SCNC: 140 MMOL/L
TROPONIN I SERPL DL<=0.01 NG/ML-MCNC: 0.01 NG/ML
WBC # BLD AUTO: 6.24 K/UL

## 2018-04-22 PROCEDURE — 93010 ELECTROCARDIOGRAM REPORT: CPT | Mod: ,,, | Performed by: INTERNAL MEDICINE

## 2018-04-22 PROCEDURE — 93005 ELECTROCARDIOGRAM TRACING: CPT

## 2018-04-22 PROCEDURE — 85025 COMPLETE CBC W/AUTO DIFF WBC: CPT

## 2018-04-22 PROCEDURE — 85610 PROTHROMBIN TIME: CPT

## 2018-04-22 PROCEDURE — 84484 ASSAY OF TROPONIN QUANT: CPT

## 2018-04-22 PROCEDURE — 83880 ASSAY OF NATRIURETIC PEPTIDE: CPT

## 2018-04-22 PROCEDURE — 99285 EMERGENCY DEPT VISIT HI MDM: CPT | Mod: ,,, | Performed by: PHYSICIAN ASSISTANT

## 2018-04-22 PROCEDURE — 99284 EMERGENCY DEPT VISIT MOD MDM: CPT | Mod: 25

## 2018-04-22 PROCEDURE — 82565 ASSAY OF CREATININE: CPT

## 2018-04-22 PROCEDURE — 80053 COMPREHEN METABOLIC PANEL: CPT

## 2018-04-22 PROCEDURE — 25500020 PHARM REV CODE 255: Performed by: EMERGENCY MEDICINE

## 2018-04-22 RX ADMIN — IOHEXOL 100 ML: 350 INJECTION, SOLUTION INTRAVENOUS at 10:04

## 2018-04-22 NOTE — ED TRIAGE NOTES
Pt reports having SOB after having a left rotator cuff repair. Pt also reports a lump and pain to the right calf area. Pt denies chest pain/n/v

## 2018-04-22 NOTE — ED NOTES
Patient identifiers verified and correct for Lorraine Childers.   LOC: The patient is awake, alert and aware of environment with an appropriate affect, the patient is oriented x 3 and speaking appropriately.   APPEARANCE: Patient appears comfortable and in no acute distress, patient is clean and well groomed.  SKIN: The skin is warm and dry, color consistent with ethnicity, patient has normal skin turgor and moist mucus membranes, skin intact, no breakdown or bruising noted.   MUSCULOSKELETAL: Patient moving all extremities spontaneously, pt reports a lump to the right calf.  RESPIRATORY: Airway is open and patent, respirations are spontaneous, patient has a normal effort and rate, no accessory muscle use noted, pt placed on continuous pulse ox with O2 sats noted at 97% on room air.  CARDIAC: Pt placed on cardiac monitor. Patient has a normal rate and regular rhythm, no edema noted, capillary refill < 3 seconds.   GASTRO: Soft and non tender to palpation, no distention noted, normoactive bowel sounds present in all four quadrants. Pt states bowel movements have been regular.  : Pt denies any pain or frequency with urination.  NEURO: Pt opens eyes spontaneously, behavior appropriate to situation, follows commands, facial expression symmetrical, bilateral hand grasp equal and even, purposeful motor response noted, normal sensation in all extremities when touched with a finger.

## 2018-04-22 NOTE — ED PROVIDER NOTES
"Encounter Date: 2018       History     Chief Complaint   Patient presents with    Shortness of Breath     Rotator cuff repair Friday. Pt reports SOB.      HPI   51yo F h/o anxiety, DM, hysterectomy who had a rotator cuff repair 2 days ago who presents to the ED c/o worsening dyspnea on exertion as well as some RLE pain that started yesterday. Pt states that's she noticed that she became more sob yesterday while she was walking around the house which is unusual for her. When she was sitting still she felt better. She also notes some R LE pain that started after she took off her compression stalkings. She denies any swelling or redness of the leg. No calf pain. No difficulty bearing weight on the extremity. + dry cough for the past day as well. Denies fevers but noted some chills earlier. No abd pain, n/v. No chest pain. Pt denies any cardiac history.     Review of patient's allergies indicates:   Allergen Reactions    Percocet [oxycodone-acetaminophen] Nausea Only     Can take Percocet if she takes Phenergan or Zofran.    Vicodin [hydrocodone-acetaminophen]      "Makes me Hyper." Doesn't help the pain.     Past Medical History:   Diagnosis Date    Anxiety     Back problem     Diabetes mellitus type I     no meds since weight loss few years back    General anesthetics causing adverse effect in therapeutic use     sometimes hard to sedate--    Pneumonia     Thyroid disease     thyroid nodules     Past Surgical History:   Procedure Laterality Date     SECTION  x2     and     HYSTERECTOMY      Partial abd    RHINOPLASTY TIP       Family History   Problem Relation Age of Onset    Diabetes Mother     Heart disease Mother     Hyperlipidemia Mother     Hypertension Mother      Social History   Substance Use Topics    Smoking status: Current Every Day Smoker     Packs/day: 1.00     Types: Cigarettes    Smokeless tobacco: Never Used    Alcohol use No     Review of Systems "   Constitutional: Negative for fever.   HENT: Negative for sore throat.    Respiratory: Negative for shortness of breath.    Cardiovascular: Negative for chest pain.   Gastrointestinal: Negative for nausea.   Genitourinary: Negative for dysuria.   Musculoskeletal: Negative for back pain.   Skin: Negative for rash.   Neurological: Negative for weakness.   Hematological: Does not bruise/bleed easily.       Physical Exam     Initial Vitals [04/22/18 1808]   BP Pulse Resp Temp SpO2   (!) 140/78 85 16 98.6 °F (37 °C) 95 %      MAP       98.67         Physical Exam    Constitutional: She appears well-developed and well-nourished. No distress.   L arm in post-op sling   HENT:   Head: Normocephalic and atraumatic.   Mouth/Throat: Oropharynx is clear and moist.   Eyes: Conjunctivae and EOM are normal. Pupils are equal, round, and reactive to light.   Cardiovascular: Normal rate, regular rhythm, normal heart sounds and intact distal pulses. Exam reveals no friction rub.    No murmur heard.  Pulmonary/Chest:   RR 17. No acute distress. Anterior lungs fields clear to auscultation. Difficult to assess breath sounds in lower lung fields due to patient habitus and effort.   Abdominal: Soft. Bowel sounds are normal. She exhibits no distension. There is no tenderness. There is no rebound and no guarding.   Musculoskeletal:   TTP in soft tissues overlying the posterior tibia on the R. There is a tender small area of swelling. No erythema or global leg edema. No calf tenderness. 2+ distal pulses bilaterally. No LLE pain, edema or erythema.    Neurological: She is alert and oriented to person, place, and time. She has normal strength. No cranial nerve deficit.   Skin: Skin is warm and dry. Capillary refill takes less than 2 seconds. No abscess noted. No pallor.   Psychiatric: She has a normal mood and affect. Thought content normal.         ED Course   Procedures  Labs Reviewed   B-TYPE NATRIURETIC PEPTIDE   CBC W/ AUTO DIFFERENTIAL    COMPREHENSIVE METABOLIC PANEL   PROTIME-INR   TROPONIN I             Medical Decision Making:   Initial Assessment:   53yo F h/o recent rotator cuff repair now with dyspnea on exertion and RLE pain. Vitals are unremarkable. Exam notable for TTP over the RLE. Breath sounds difficult to assess. Pt is not in distress. Differential includes DVT/PE post procedure, pneumonia, acs, pericarditis. RLE is ttp over the soft tissue in an isolated location which is not consistent with dvt. There is no bony tenderness. This is possibly due to her surgical positioning vs at home pt states her leg was hooked over a recliner all day. Will assess with basic labs, trop, bnp, ekg, cxr. Will evaluate with CT chest for pe given d-dimer is unlikely to be useful post op.     Carmita Patel        APC / Resident Notes:   11:46 PM  This patient was signed out to me pending imaging studies and final disposition.  Ultrasound of the lower extremity revealed no evidence of DVT.  CT PE study revealed no evidence of VTE.  Scattered groundglass opacities were noted to bilateral lower lobes.  This was felt to be most likely secondary to atelectasis in this postop patient.  Though patient reported a dry cough for the past day, her blood work reveals no leukocytosis and her vital signs have been stable.  I feel that infectious etiology is less likely.  She will be discharged in stable condition.  Yeny Webster PA-C         Attending Attestation:     Physician Attestation Statement for NP/PA:   I discussed this assessment and plan of this patient with the NP/PA, but I did not personally examine the patient. The face to face encounter was performed by the NP/PA.                     Clinical Impression:   The primary encounter diagnosis was Dyspnea on exertion. Diagnoses of SOB (shortness of breath) and Leg pain, posterior, right were also pertinent to this visit.    Disposition:   Disposition: Discharged  Condition: Stable                         Yeny Webster PA-C  04/25/18 1834       Fred Rose MD  04/27/18 0782

## 2018-04-23 NOTE — DISCHARGE INSTRUCTIONS
If you develop a fever and begin coughing up mucus, follow up with your primary care provider or return to the ER.

## 2018-04-23 NOTE — ED NOTES
Left shoulder dressing report per Dr Hawthorne. 5 small surgical sites noted to shoulder, no bleeding or drainage noted, 5 metiplex dressing to surgical sites, dressing D/I.

## 2018-04-23 NOTE — ED NOTES
Report received from Taqueria Anna RN. Assume care of pt. Pt resting comfortably and independently repositioned in stretcher with bed locked in lowest position for safety. NAD noted at this time. Respirations even and unlabored and visible chest rise noted.  Patient offered bathroom assistance and denies need at this time. Pt instructed to call if assistance is needed. Pt on continuous cardiac, BP, and O2 monitoring. Call light within reach. Family at bedside. No needs at this time. Will continue to monitor.

## 2018-04-24 NOTE — ADDENDUM NOTE
Addendum  created 04/24/18 1354 by Jose Trivedi MD    Anesthesia Intra Blocks edited, LDA updated via procedure documentation, Sign clinical note

## 2018-04-25 ENCOUNTER — CLINICAL SUPPORT (OUTPATIENT)
Dept: REHABILITATION | Facility: HOSPITAL | Age: 53
End: 2018-04-25
Attending: ORTHOPAEDIC SURGERY
Payer: MEDICAID

## 2018-04-25 ENCOUNTER — TELEPHONE (OUTPATIENT)
Dept: SPORTS MEDICINE | Facility: CLINIC | Age: 53
End: 2018-04-25

## 2018-04-25 DIAGNOSIS — M25.612 DECREASED ROM OF LEFT SHOULDER: ICD-10-CM

## 2018-04-25 DIAGNOSIS — M25.512 LEFT SHOULDER PAIN, UNSPECIFIED CHRONICITY: Primary | ICD-10-CM

## 2018-04-25 DIAGNOSIS — M62.81 WEAKNESS OF TRUNK MUSCULATURE: ICD-10-CM

## 2018-04-25 PROCEDURE — 97110 THERAPEUTIC EXERCISES: CPT | Mod: PN

## 2018-04-25 PROCEDURE — 97161 PT EVAL LOW COMPLEX 20 MIN: CPT | Mod: PN

## 2018-04-25 NOTE — PROGRESS NOTES
"  TIME RECORD    Date: 04/25/2018    Start Time:  1030  Stop Time:  1130      OUTPATIENT PHYSICAL THERAPY   PATIENT EVALUATION  Primary Diagnosis:   1. Left shoulder pain, unspecified chronicity     2. Decreased ROM of left shoulder     3. Weakness of trunk musculature       Treatment Diagnosis: decreased ROM shoulder, weakness of shoulder, muscle tightness  Past Medical History:   Diagnosis Date    Anxiety     Back problem     Diabetes mellitus type I     no meds since weight loss few years back    General anesthetics causing adverse effect in therapeutic use     sometimes hard to sedate--    Pneumonia     Thyroid disease     thyroid nodules     Precautions: standard - follow medium RTC repair protocol with tenodesis  Prior Therapy: yes  Medications: Lorraine Childers has a current medication list which includes the following prescription(s): atorvastatin, celecoxib, ibuprofen, lorazepam, omeprazole, oxycodone-acetaminophen, and promethazine.  Nutrition:  Normal  History of Present Illness: s/p L shoulder RTC repair with biceps tenodesis 4/20/2018.   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 Pt went to ED for fear of DVT, however this was ruled out. Pt reports she has to sleep sitting up in the recliner.     Pain:  Location: L shoulder   Description: soreness  Activities Which Increase Pain: N/A  Activities Which Decrease Pain: pain medication  Pain Scale: 0/10 at best 7/10 now  7/10 at worst    Objective     Observation/posture: pt arrived to " clinic with abduction sling donned, self applied band-aids over incision sites - bruising on axiallary surface of LUE  Shoulder   Right    Left   Pain/Dysfunction with Movement     AROM  PROM  MMT  AROM  PROM  MMT     flexion  135  4 NT  NT    Extension  60  4 NT  NT    Abduction  145  4 NT  NT    Adduction  WNL  4 NT  NT    Internal rotation  T7  4 NT  NT    ER at 90° abd  T3  4 NT  NT      Sensation/Reflexes: grossly intact to light touch throughout BUE    Scapula:  LUE NT - RUE as below              Rhomboids: 4                       middle trap: 4                            lower trap:4                              serratus ant: 4                            Lats: 4  Elbow screen: RUE WNL - LUE: elbow extension lacking 45 degrees (heavy muscle guarding), flexion WNL (all passive)  Cervical screen: ROM WNL in all directions    .FOTO: 87% limitation    today's treatment x25 minutes:  education: educated in evaluation findings and POC.  Educated in expectations of treatment, provider's contact information (email and phone) given to pt for communication of any questions and concerns.  HEP instruction and ther ex: instructed and performed following:   PROM shoulder ER at neutral ABD (0-15 degrees) and flexion in scapular plane (0-30 degrees)   Pendulums x20 seconds 2x   Seated with sling donned    Scapular retractions 2x10 3 seconds holds    Shoulder shruggs 2x10 3 second holds    Wrist flexion/extension 2x10    Wrist supination/pronation 2x10  Pt verbally understood the instructions as they were given and demonstrated proper form and technique during therapy. Pt was advised to perform these exercises free of pain, and to stop performing them if pain occurs.           Assessment       Initial Assessment (Pertinent finding, problem list and factors affecting outcome): Ms. Lorraine Benedict is a 53 y/o female referred to outpatient PT s/p L shoulder arthroscopic RTC repair with biceps tenodesis. Pt presenting with  significant tenderness throughout the L shoulder, swelling of the L shoulder, decreased ROM L shoulder and elbow, and weakness throughout shoulder and trunk musculature. Pt signs and symptoms consistent with referring diagnosis. Pt would benefit from skilled PT to address above stated problems, as well as, achieve pt goals within a timely manner. Pt has set realistic goals and has verbalized good understanding and agreement with reported diagnosis, prognosis and treatment. Pt demonstrates no additional cultural, spiritual or educational need and currently has no barriers to learning.    History  Co-morbidities and personal factors that may impact the plan of care Examination  Body Structures and Functions, activity limitations and participation restrictions that may impact the plan of care      Clinical Presentation   Co-morbidities:   high BMI           Personal Factors:   lifestyle Body Regions:   upper extremities  trunk     Body Systems:    gross symmetry  ROM  strength  gross coordinated movement  balance  gait  transfers  transitions                 Participation Restrictions:   Work, domestic, recreation, community       Activity limitations:   Learning and applying knowledge  no deficits     General Tasks and Commands  no deficits     Communication  no deficits     Mobility  lifting and carrying objects     Self care  dressing     Domestic Life  shopping  cooking  doing house work (cleaning house, washing dishes, laundry)     Interactions/Relationships  no deficits     Life Areas  no deficits     Community and Social Life  community life  recreation and leisure             stable and uncomplicated                                low   low   moderate Decision Making/ Complexity Score:  low             Rehab Potiential: good    Short Term Goals (4 Weeks):   1. Pt will report 25% reduction in L shoulder pain for ease with ADL's  2. Pt will tolerate progression of PROM to protocol and surgeon established goals    3. Pt will tolerate progression of scapular strengthening from manual resistance with good results  Long Term Goals (12 Weeks):   1. Pt will demonstrate full PROM of the L shoulder without compensation   2. Pt will report compliance with post op restrictions as described on post op protocol  3. Pt will tolerated progression to AAROM of the L shoulder for improved independence with daily hygeine and body clothing  LONG term goals (24 weeks)  1. Pt will demonstrate full painfree AROM of the L shoulder for ease with ADLs  2. Pt will demonstrate full functional strength of the L shoulder in all planes for ease with return to work related activities  3. Pt will demonstrate full periscapular strength for ease with shoulder mechanics and household chores  4. Pt will report being independent with HEP for maintenance of improvements gained during therapy sessions  Plan     Certification Period: 4/25/18 to 12/25/18  Recommended Treatment Plan: 1-2 times per week for 28 weeks: Electrical Stimulation PRN, Iontophoresis (with dexamethasone PRN), Manual Therapy, Moist Heat/ Ice, Neuromuscular Re-ed, Patient Education, Therapeutic Activites, Therapeutic Exercise and Other therapeutic taping, dry needling, aquatic therapy    Other Recommendations: PTA to be involved in pt POC.        Therapist: Salima Engle, PT    I CERTIFY THE NEED FOR THESE SERVICES FURNISHED UNDER THIS PLAN OF TREATMENT AND WHILE UNDER MY CARE    Physician's comments: ________________________________________________________________________________________________________________________________________________      Physician's Name: ___________________________________

## 2018-04-25 NOTE — TELEPHONE ENCOUNTER
Spoke with pt and she stated that she is having SOB, CP, a rash on her face, and blisters on her surgical shoulder. Advised pt to not pop blisters and that is was likely a result of either the prep  from surgery or the bandages and the rash on her face could potentially be from the same. But I stressed that if she is having CP and SOB that she needs to go to the ED. I made her aware that there is not much our office could do for CP and SOB and we advise she go to ED. Asked if she should call Jose Felix NP to make an appt. Advised her again that if she is having CP and SOB she should go to the ED. She voiced several complaints about the ED at [facility]. Told her that she did not have to go to [facility] ED if she was unhappy but it would be better if she went to an [facility] ED in general so we can have access to her ED notes/tests. Made her aware that she did not have to go to an [facility] ED if she did not want to but again advised she go to an ED for her complaints. Advised to keep us updated. Pt NINA.    ----- Message from Aliza Sharma sent at 4/25/2018  9:03 AM CDT -----  Contact: self  Pt called requesting an immediate return call back regarding some serious issues that she's experiencing right now such as shortness of breathe, rashes and blisters. Pt would like a call back from Clara and could be reached at [phone] cell

## 2018-04-26 RX ORDER — LEVOFLOXACIN 500 MG/1
500 TABLET, FILM COATED ORAL DAILY
Qty: 10 TABLET | Refills: 0 | Status: SHIPPED | OUTPATIENT
Start: 2018-04-26 | End: 2018-05-06

## 2018-05-02 ENCOUNTER — OFFICE VISIT (OUTPATIENT)
Dept: SPORTS MEDICINE | Facility: CLINIC | Age: 53
End: 2018-05-02
Payer: MEDICAID

## 2018-05-02 VITALS
HEIGHT: 67 IN | WEIGHT: 205 LBS | BODY MASS INDEX: 32.18 KG/M2 | HEART RATE: 82 BPM | DIASTOLIC BLOOD PRESSURE: 80 MMHG | SYSTOLIC BLOOD PRESSURE: 125 MMHG

## 2018-05-02 DIAGNOSIS — M75.102 ROTATOR CUFF SYNDROME, LEFT: ICD-10-CM

## 2018-05-02 DIAGNOSIS — M19.012 ARTHRITIS OF LEFT ACROMIOCLAVICULAR JOINT: ICD-10-CM

## 2018-05-02 DIAGNOSIS — M75.122 COMPLETE TEAR OF LEFT ROTATOR CUFF: ICD-10-CM

## 2018-05-02 DIAGNOSIS — M75.22 BICEPS TENDONITIS ON LEFT: ICD-10-CM

## 2018-05-02 PROCEDURE — 99999 PR PBB SHADOW E&M-EST. PATIENT-LVL III: CPT | Mod: PBBFAC,,, | Performed by: ORTHOPAEDIC SURGERY

## 2018-05-02 PROCEDURE — 99213 OFFICE O/P EST LOW 20 MIN: CPT | Mod: PBBFAC,PO | Performed by: ORTHOPAEDIC SURGERY

## 2018-05-02 PROCEDURE — 99024 POSTOP FOLLOW-UP VISIT: CPT | Mod: ,,, | Performed by: ORTHOPAEDIC SURGERY

## 2018-05-02 RX ORDER — OXYCODONE AND ACETAMINOPHEN 10; 325 MG/1; MG/1
1 TABLET ORAL EVERY 12 HOURS PRN
Qty: 60 TABLET | Refills: 0 | Status: SHIPPED | OUTPATIENT
Start: 2018-05-02 | End: 2018-06-20 | Stop reason: SDUPTHER

## 2018-05-02 RX ORDER — PROMETHAZINE HYDROCHLORIDE 25 MG/1
25 TABLET ORAL EVERY 12 HOURS PRN
Qty: 45 TABLET | Refills: 0 | Status: SHIPPED | OUTPATIENT
Start: 2018-05-02 | End: 2018-05-28 | Stop reason: SDUPTHER

## 2018-05-02 NOTE — PROGRESS NOTES
Subjective:          Chief Complaint: Lorraine Childers is a 52 y.o. female who had concerns including Post-op Evaluation of the Left Shoulder.    Patient is here for a follow up for her left shoulder. She is doing PT at [facility].    She went to the ED with shortness of breath after surgery. She was cleared of a blood clot. She was prescribed antibiotics by her NP and is feeling better.       DATE OF PROCEDURE: 4/20/2018     ATTENDING SURGEON: Surgeon(s) and Role:     * Susanna Vann MD - Primary  Assistants:  Juan Luis Smith MD-Fellow  Lisette Akbar PA-C-Assistant     PREOPERATIVE DIAGNOSIS:  Left shoulder   Tendinitis, Biceps M75.20 A-C Arthritis M12.9, Rotator Cuff Syndrome/Disorder  M75.100 and Tear, Rotator Cuff, Non-traumatic M75.100     POSTOPERATIVE DIAGNOSIS: Left shoulder   Tendinitis, Biceps M75.20 A-C Arthritis M12.9, Rotator Cuff Syndrome/Disorder  M75.100, Tear, Rotator Cuff, Non-traumatic M75.100 and Tendinitis, Biceps M75.20            PROCEDURES PERFORMED:  1. Left shoulder Arthroscopic rotator cuff repair CPT - 41427     2. Left shoulder Biceps tenodesis CPT - 85282     3. Left shoulder Arthroscopic distal clavicle excision CPT - 14265     4. Left shoulder Amniox Arthrocentesis, 64389      Handedness: right-handed  Sport played:    Level:            Pain   This is a chronic problem. The current episode started more than 1 month ago. The problem occurs intermittently. The problem has been rapidly worsening. Pertinent negatives include no chest pain, fever, joint swelling, numbness or rash. The symptoms are aggravated by exertion. The treatment provided no relief.       Review of Systems   Constitution: Negative for fever and night sweats.   HENT: Negative for hearing loss.    Eyes: Negative for blurred vision and visual disturbance.   Cardiovascular: Negative for chest pain and leg swelling.   Respiratory: Negative for shortness of breath.    Endocrine: Negative for polyuria.    Hematologic/Lymphatic: Negative for bleeding problem.   Skin: Negative for rash.   Musculoskeletal: Negative for back pain, joint pain, joint swelling, muscle cramps and muscle weakness.   Gastrointestinal: Negative for melena.   Genitourinary: Negative for hematuria.   Neurological: Negative for loss of balance, numbness and paresthesias.   Psychiatric/Behavioral: Negative for altered mental status.       Pain Related Questions  Over the past 3 days, what was your average pain during activity? (I.e. running, jogging, walking, climbing stairs, getting dressed, ect.): 3  Over the past 3 days, what was your highest pain level?: 7  Over the past 3 days, what was your lowest pain level? : 0    Other  How many nights a week are you awakened by your affected body part?: 3  Was the patient's HEIGHT measured or patient reported?: Patient Reported  Was the patient's WEIGHT measured or patient reported?: Measured      Objective:        General: Lorraine Fonseca is well-developed, well-nourished, appears stated age, in no acute distress, alert and oriented to time, place and person.     General    Vitals reviewed.  Constitutional: She is oriented to person, place, and time. She appears well-developed and well-nourished. No distress.   HENT:   Nose: Nose normal.   Mouth/Throat: No oropharyngeal exudate.   Eyes: Pupils are equal, round, and reactive to light. Right eye exhibits no discharge. Left eye exhibits no discharge.   Neck: Normal range of motion.   Cardiovascular: Normal rate and intact distal pulses.    Pulmonary/Chest: Effort normal and breath sounds normal. No respiratory distress.   Neurological: She is alert and oriented to person, place, and time. She has normal reflexes. She displays normal reflexes. No cranial nerve deficit. Coordination normal.   Psychiatric: She has a normal mood and affect. Her behavior is normal. Judgment and thought content normal.         Right Shoulder Exam     Inspection/Observation    Swelling: absent  Bruising: absent  Scars: absent  Deformity: absent  Scapular Winging: absent  Scapular Dyskinesia: negative  Atrophy: absent    Tenderness   The patient is experiencing no tenderness.        Range of Motion   Active Abduction:  90 normal   Passive Abduction:  100 normal   Extension:  0 normal   Forward Flexion:  180 normal   Forward Elevation: 180 normal  Adduction: 40 normal  External Rotation 0 degrees:  50 normal   External Rotation 90 degrees: 90 normal  Internal Rotation 0 degrees:  T8 normal   Internal Rotation 90 degrees:  30 normal     Tests & Signs   Apprehension: negative  Cross Arm: negative  Drop Arm: negative  Hawkin's test: negative  Impingement: negative  Sulcus: absent  Anterosuperior Escape: negative  Lag Sign 0 degrees: negative  Lag Sign 90 degrees: negative  Lift Off Sign: negative  Belly Press: negative  Active Compression Test ([place]'s Sign): negative  Yergason's Test: negative  Speed's Test: negative  Anterior Drawer Test: 1+   Posterior Drawer Test: 1+  Relocation 90 degrees: negative  Relocation > 90 degrees: negative  Bear Hug: negative  Moving Valgus: negative  Jerk Test: negative    Other   Sensation: normal    Left Shoulder Exam     Inspection/Observation   Swelling: absent  Bruising: absent  Scars: absent  Deformity: absent  Scapular Winging: absent  Scapular Dyskinesia: negative  Atrophy: absent    Tenderness   The patient is tender to palpation of the greater tuberosity and acromioclavicular joint.    Range of Motion   Active Abduction:  90 abnormal   Passive Abduction:  90 abnormal   Extension:  0 normal   Forward Flexion:  90 abnormal   Forward Elevation: 90 abnormal  Adduction: 40 normal  External Rotation 0 degrees:  30 abnormal   External Rotation 90 degrees: 20 abnormal  Internal Rotation 0 degrees: abnormal Left shoulder internal rotation 0 degrees: body.   Internal Rotation 90 degrees:  0 abnormal     Tests & Signs   Apprehension: negative  Cross Arm:  negative  Drop Arm: negative  Hawkin's test: negative  Impingement: negative  Sulcus: absent  Rotator Cuff Painful Arc/Range: severe  Anterosuperior Escape: negative  Lag Sign 0 degrees: negative  Lag Sign 90 degrees: negative  Lift Off Sign: negative  Belly Press: negative  Active Compression test ([place]'s Sign): negative  Yergasons's Test: negative  Speed's Test: negative  Anterior Drawer Test: 1+  Posterior Drawer Test: 1+  Relocation 90 degrees: negative  Relocation > 90 degrees: negative  Bear Hug: negative  Moving Valgus: negative  Jerk Test: negative    Other   Sensation: normal       Muscle Strength   Right Upper Extremity   Shoulder Abduction: 5/5   Shoulder Internal Rotation: 5/5   Shoulder External Rotation: 5/5   Supraspinatus: 5/5/5   Subscapularis: 5/5/5   Biceps: 5/5/5   Left Upper Extremity  Shoulder Abduction: 5/5   Shoulder Internal Rotation: 5/5   Shoulder External Rotation: 4/5   Supraspinatus: 5/5/5   Subscapularis: 5/5/5   Biceps: 5/5/5     Reflexes     Left Side  Biceps:  2+  Triceps:  2+  Brachioradialis:  2+    Right Side   Biceps:  2+  Triceps:  2+  Brachioradialis:  2+    Vascular Exam     Right Pulses      Radial:                    2+      Left Pulses      Radial:                    2+      Capillary Refill  Right Hand: normal capillary refill  Left Hand: normal capillary refill  Medium size Rotator cuff protocol   Biceps tenodesis was performed; if tenodesis was performed limit aggressive biceps flexion exercises for first 6 weeks     Start Therapy in 3-5 days     Use Sling and Abduction Pillow - For  6 weeks then discontinue pillow at 6 weeks time to protect rotator cuff repair and biceps tenodesis.   Discontinue all immobilization in 6 weeks. Remove arm from sling immediately and move elbow and wrist as tolerated.     Cuff specific program:  Pendulum exercises and Codman's exercises in 3-5 days protecting rotator cuff repair for 6 weeks by avoiding active motion program until 2-3  weeks.     PASSIVE ROM: no motion for 3 weeks, ER side 45 degrees, Forward Flex 180 degrees, ABD - 90 degrees   Full AAROM/PROM starting at 4-6 weeks as tolerated           Assessment:       Encounter Diagnoses   Name Primary?    Rotator cuff syndrome, left     Complete tear of left rotator cuff     Biceps tendonitis on left     Arthritis of left acromioclavicular joint           Plan:       1. ASES and SF-12 was not filled out today in clinic.     RTC in 4 weeks with Dr. Susanna Vann. Patient will not fill out ASES, SF-12 on return.    2. Continue PT at [facility]    3. Continue Sling and pillow at this time    4. Sutures removed today    5. Refilled pain medication today                    [facility] patient questionnaires have been collected today.

## 2018-05-02 NOTE — PATIENT INSTRUCTIONS
Medium size Rotator cuff protocol   Biceps tenodesis was performed; if tenodesis was performed limit aggressive biceps flexion exercises for first 6 weeks     Start Therapy in 3-5 days     Use Sling and Abduction Pillow - For  6 weeks then discontinue pillow at 6 weeks time to protect rotator cuff repair and biceps tenodesis. Discontinue all immobilization in 6 weeks. Remove arm from sling immediately and move elbow and wrist as tolerated.     Cuff specific program:  Pendulum exercises and Codman's exercises in 3-5 days protecting rotator cuff repair for 6 weeks by avoiding active motion program until 2-3 weeks.     PASSIVE ROM: no motion for 3 weeks, ER side 45 degrees, Forward Flex 180 degrees, ABD - 90 degrees   Full AAROM/PROM starting at 4-6 weeks as tolerated

## 2018-05-04 ENCOUNTER — CLINICAL SUPPORT (OUTPATIENT)
Dept: REHABILITATION | Facility: HOSPITAL | Age: 53
End: 2018-05-04
Attending: ORTHOPAEDIC SURGERY
Payer: MEDICAID

## 2018-05-04 DIAGNOSIS — M25.512 LEFT SHOULDER PAIN, UNSPECIFIED CHRONICITY: Primary | ICD-10-CM

## 2018-05-04 DIAGNOSIS — M62.81 WEAKNESS OF TRUNK MUSCULATURE: ICD-10-CM

## 2018-05-04 DIAGNOSIS — M25.612 DECREASED ROM OF LEFT SHOULDER: ICD-10-CM

## 2018-05-04 PROCEDURE — 97110 THERAPEUTIC EXERCISES: CPT | Mod: PN

## 2018-05-04 NOTE — PROGRESS NOTES
Name: Lorraine Childers  Clinic Number: [id number]  Date of Treatment: 05/04/2018   Diagnosis:   Encounter Diagnoses   Name Primary?    Left shoulder pain, unspecified chronicity Yes    Decreased ROM of left shoulder     Weakness of trunk musculature        Time in: 1030  Time Out: 1120  Total Treatment Time: 50      Subjective:    Lorraine Fonseca reports improvement of symptoms.  Patient reports their pain to be 1/10 on a 0-10 scale with 0 being no pain and 10 being the worst pain imaginable. Pt reports she has more soreness than pain and she just feels very tense. Pt states she saw Dr. Vann earlier this week, he moved her arm all the way up which caused a good bit of pain.     Objective    Patient received individual therapy to increase strength, endurance, ROM, flexibility, posture and core stabilization with activities as follows:     Lorraine Fonseca received therapeutic exercises to develop strength, endurance, ROM, flexibility, posture and core stabilization for 45 minutes including:   PROM shoulder ER at neutral ABD (0-30 degrees degrees), ABD to 80 and flexion in scapular plane (0-90 degrees) - gentle oscillations to reduce muscle guarding  Pendulums x20 seconds 3x - gentle (grade 1) oscillations by PT  Seated with sling donned   Scapular retractions 3x10 3 seconds holds   Shoulder shruggs 3x10 3 second holds   Cervical rotation 3x10 ea.    Cervical SB 3x10 ea.    Wrist flexion/extension 3x10   Wrist supination/pronation 3x10  Manual scapular strengthening 2x10 medial and inferior glide    Manual therapy consisting of STM and joint mobilization x10 minutes   STM to posterior axillary wall   Gentle oscillations to the L GH joint with all PROM and pendulums     Written Home Exercises Provided: reviewed from IE  Pt demo good understanding of the education provided. Lorraine Fonseca demonstrated good return demonstration of activities.     Assessment:   Lorraine tolerated treatment session well this morning. Pt  with excellent relief of tension within the LUE with all exercises performed. Pt with greatest muscle guarding demonstrated with shoulder ER in neutral position, however, with excellent response to STM to the L posterior axillary wall and instruction on graded motor imagery (pictured painfree easy motion of the RUE). Immediate reduction in muscle guarding demonstrated. Pt without capsular restrictions palpated within PROM from this session. Plant o continue progression L shoulder ROM and strength following post op protocol as tolerated.   Pt will continue to benefit from skilled PT intervention. Medical Necessity is demonstrated by:  Fall Risk, Unable to participate fully in daily activities, Requires skilled supervision to complete and progress HEP and Weakness.    Patient is making good progress towards established goals.    Short Term Goals (4 Weeks):   1. Pt will report 25% reduction in L shoulder pain for ease with ADL's  2. Pt will tolerate progression of PROM to protocol and surgeon established goals   3. Pt will tolerate progression of scapular strengthening from manual resistance with good results  Long Term Goals (12 Weeks):   1. Pt will demonstrate full PROM of the L shoulder without compensation   2. Pt will report compliance with post op restrictions as described on post op protocol  3. Pt will tolerated progression to AAROM of the L shoulder for improved independence with daily hygeine and body clothing  LONG term goals (24 weeks)  1. Pt will demonstrate full painfree AROM of the L shoulder for ease with ADLs  2. Pt will demonstrate full functional strength of the L shoulder in all planes for ease with return to work related activities  3. Pt will demonstrate full periscapular strength for ease with shoulder mechanics and household chores  4. Pt will report being independent with HEP for maintenance of improvements gained during therapy sessions  Plan      Certification Period: 4/25/18 to  12/25/18  Recommended Treatment Plan: 1-2 times per week for 28 weeks: Electrical Stimulation PRN, Iontophoresis (with dexamethasone PRN), Manual Therapy, Moist Heat/ Ice, Neuromuscular Re-ed, Patient Education, Therapeutic Activites, Therapeutic Exercise and Other therapeutic taping, dry needling, aquatic therapy     Other Recommendations: PTA to be involved in pt POC.    Continue with established Plan of Care towards PT goals.     Salima Engle, PT

## 2018-05-08 ENCOUNTER — OFFICE VISIT (OUTPATIENT)
Dept: FAMILY MEDICINE | Facility: CLINIC | Age: 53
End: 2018-05-08
Payer: MEDICAID

## 2018-05-08 ENCOUNTER — LAB VISIT (OUTPATIENT)
Dept: LAB | Facility: HOSPITAL | Age: 53
End: 2018-05-08
Payer: MEDICAID

## 2018-05-08 VITALS
OXYGEN SATURATION: 97 % | WEIGHT: 209 LBS | DIASTOLIC BLOOD PRESSURE: 68 MMHG | SYSTOLIC BLOOD PRESSURE: 122 MMHG | HEART RATE: 90 BPM | HEIGHT: 67 IN | BODY MASS INDEX: 32.8 KG/M2 | TEMPERATURE: 98 F

## 2018-05-08 DIAGNOSIS — R10.11 RUQ ABDOMINAL PAIN: ICD-10-CM

## 2018-05-08 DIAGNOSIS — R60.0 EDEMA OF RIGHT LOWER EXTREMITY: Primary | ICD-10-CM

## 2018-05-08 LAB
ALBUMIN SERPL BCP-MCNC: 3.7 G/DL
ALP SERPL-CCNC: 98 U/L
ALT SERPL W/O P-5'-P-CCNC: 35 U/L
AMYLASE SERPL-CCNC: 50 U/L
ANION GAP SERPL CALC-SCNC: 8 MMOL/L
AST SERPL-CCNC: 22 U/L
BASOPHILS # BLD AUTO: 0.1 K/UL
BASOPHILS NFR BLD: 1.9 %
BILIRUB SERPL-MCNC: 0.3 MG/DL
BUN SERPL-MCNC: 14 MG/DL
CALCIUM SERPL-MCNC: 9.5 MG/DL
CHLORIDE SERPL-SCNC: 106 MMOL/L
CO2 SERPL-SCNC: 26 MMOL/L
CREAT SERPL-MCNC: 0.8 MG/DL
DIFFERENTIAL METHOD: NORMAL
EOSINOPHIL # BLD AUTO: 0.4 K/UL
EOSINOPHIL NFR BLD: 7.1 %
ERYTHROCYTE [DISTWIDTH] IN BLOOD BY AUTOMATED COUNT: 14.4 %
EST. GFR  (AFRICAN AMERICAN): >60 ML/MIN/1.73 M^2
EST. GFR  (NON AFRICAN AMERICAN): >60 ML/MIN/1.73 M^2
GLUCOSE SERPL-MCNC: 93 MG/DL
HCT VFR BLD AUTO: 41.9 %
HGB BLD-MCNC: 13.4 G/DL
IMM GRANULOCYTES # BLD AUTO: 0.01 K/UL
IMM GRANULOCYTES NFR BLD AUTO: 0.2 %
LIPASE SERPL-CCNC: 35 U/L
LYMPHOCYTES # BLD AUTO: 1.6 K/UL
LYMPHOCYTES NFR BLD: 31.1 %
MCH RBC QN AUTO: 29 PG
MCHC RBC AUTO-ENTMCNC: 32 G/DL
MCV RBC AUTO: 91 FL
MONOCYTES # BLD AUTO: 0.4 K/UL
MONOCYTES NFR BLD: 8.4 %
NEUTROPHILS # BLD AUTO: 2.7 K/UL
NEUTROPHILS NFR BLD: 51.3 %
NRBC BLD-RTO: 0 /100 WBC
PLATELET # BLD AUTO: 263 K/UL
PMV BLD AUTO: 9.9 FL
POTASSIUM SERPL-SCNC: 4.4 MMOL/L
PROT SERPL-MCNC: 7.1 G/DL
RBC # BLD AUTO: 4.62 M/UL
SODIUM SERPL-SCNC: 140 MMOL/L
WBC # BLD AUTO: 5.24 K/UL

## 2018-05-08 PROCEDURE — 99999 PR PBB SHADOW E&M-EST. PATIENT-LVL IV: CPT | Mod: PBBFAC,,, | Performed by: NURSE PRACTITIONER

## 2018-05-08 PROCEDURE — 80053 COMPREHEN METABOLIC PANEL: CPT

## 2018-05-08 PROCEDURE — 99214 OFFICE O/P EST MOD 30 MIN: CPT | Mod: S$PBB,,, | Performed by: NURSE PRACTITIONER

## 2018-05-08 PROCEDURE — 99214 OFFICE O/P EST MOD 30 MIN: CPT | Mod: PBBFAC,PO | Performed by: NURSE PRACTITIONER

## 2018-05-08 PROCEDURE — 36415 COLL VENOUS BLD VENIPUNCTURE: CPT | Mod: PO

## 2018-05-08 PROCEDURE — 82150 ASSAY OF AMYLASE: CPT

## 2018-05-08 PROCEDURE — 85025 COMPLETE CBC W/AUTO DIFF WBC: CPT

## 2018-05-08 PROCEDURE — 83690 ASSAY OF LIPASE: CPT

## 2018-05-09 ENCOUNTER — CLINICAL SUPPORT (OUTPATIENT)
Dept: REHABILITATION | Facility: HOSPITAL | Age: 53
End: 2018-05-09
Attending: ORTHOPAEDIC SURGERY
Payer: MEDICAID

## 2018-05-09 DIAGNOSIS — M25.612 DECREASED ROM OF LEFT SHOULDER: ICD-10-CM

## 2018-05-09 DIAGNOSIS — M62.81 WEAKNESS OF TRUNK MUSCULATURE: ICD-10-CM

## 2018-05-09 DIAGNOSIS — M25.512 LEFT SHOULDER PAIN, UNSPECIFIED CHRONICITY: Primary | ICD-10-CM

## 2018-05-09 PROCEDURE — 97110 THERAPEUTIC EXERCISES: CPT | Mod: PN

## 2018-05-09 NOTE — PROGRESS NOTES
This dictation has been generated using Dragon Dictation some phonetic errors may occur.     Problem List Items Addressed This Visit     None      Visit Diagnoses     Edema of right lower extremity    -  Primary    Relevant Orders    US Abdomen Complete    RUQ abdominal pain        Relevant Orders    Amylase (Completed)    CBC auto differential (Completed)    Comprehensive metabolic panel (Completed)    Lipase (Completed)    US Abdomen Complete        Orders Placed This Encounter    US Abdomen Complete    Amylase    CBC auto differential    Comprehensive metabolic panel    Lipase     Chest pain consider GERD.  Patient has had repeat workup in recent visit to the emergency room without any cardiac cause noted.  Also consider cholelithiasis.  Check labs as above.  I'll review and address accordingly.    Follow-up if symptoms worsen or fail to improve.    ________________________________________________________________  ________________________________________________________________      Chief Complaint   Patient presents with    swelling in leg    burning in chest     History of present illness  This 52 y.o. presents today for complaint of chest pain over the weekend.  She did go to the emergency room.  She has persistent symptoms.  She did have a recent shoulder surgery and notes some lower leg swelling.  I asked that she come in for reassurance that this was not a DVT.  I reviewed the emergency room record.  Patient states that she felt like she needed to burp.  Again she's had her heart checked multiple times even by myself without abnormalities.  In January she saw GI and had a endoscopy with Dr. Saldivar which was reported normal.  She had a colonoscopy last year.  Records are not available for my review.  Recent shoulder surgery.  Unfortunately continues to smoke.  Risk factors for DVT discussed.  Patient does note some lower leg swelling.  She also notes some lateral swelling to the leg which was  present on the emergency room visit but has improved now.  Review of systems  No fever chills sweats  No shortness of breath dyspnea on exertion orthopnea  Nausea without vom  No abdominal pain with eating.  Patient denies a rash  No polyuria or polydipsia.    Past Medical History:   Diagnosis Date    Anxiety     Back problem     Diabetes mellitus type I     no meds since weight loss few years back    General anesthetics causing adverse effect in therapeutic use     sometimes hard to sedate--    Pneumonia     Thyroid disease     thyroid nodules       Past Surgical History:   Procedure Laterality Date     SECTION  x2     and     HYSTERECTOMY      Partial abd    RHINOPLASTY TIP      SHOULDER SURGERY         Family History   Problem Relation Age of Onset    Diabetes Mother     Heart disease Mother     Hyperlipidemia Mother     Hypertension Mother        Social History     Social History    Marital status: Single     Spouse name: N/A    Number of children: N/A    Years of education: N/A     Social History Main Topics    Smoking status: Current Every Day Smoker     Packs/day: 1.00     Types: Cigarettes    Smokeless tobacco: Never Used    Alcohol use No    Drug use: No    Sexual activity: Not Asked     Other Topics Concern    None     Social History Narrative    None       Current Outpatient Prescriptions   Medication Sig Dispense Refill    atorvastatin (LIPITOR) 40 MG tablet Take 1 tablet (40 mg total) by mouth once daily. 90 tablet 3    celecoxib (CELEBREX) 200 MG capsule Take 1 capsule (200 mg total) by mouth 2 (two) times daily. 60 capsule 1    ibuprofen (ADVIL,MOTRIN) 800 MG tablet Take 1 tablet (800 mg total) by mouth 3 (three) times daily as needed for Pain. 90 tablet 0    LORazepam (ATIVAN) 0.5 MG tablet TAKE 1 TABLET BY MOUTH EVERY 6 HOURS AS NEEDED (Patient taking differently: TAKE 1 TABLET BY MOUTH EVERY BID) 90 tablet 2    omeprazole (PRILOSEC) 20 MG capsule Take 20  "mg by mouth once daily.      oxyCODONE-acetaminophen (PERCOCET)  mg per tablet Take 1 tablet by mouth every 12 (twelve) hours as needed for Pain. 60 tablet 0    promethazine (PHENERGAN) 25 MG tablet Take 1 tablet (25 mg total) by mouth every 12 (twelve) hours as needed for Nausea. 45 tablet 0     No current facility-administered medications for this visit.        Review of patient's allergies indicates:   Allergen Reactions    Percocet [oxycodone-acetaminophen] Nausea Only     Can take Percocet if she takes Phenergan or Zofran.    Vicodin [hydrocodone-acetaminophen]      "Makes me Hyper." Doesn't help the pain.       Physical examination  Vitals Reviewed  Gen. Well-dressed well-nourished no apparent distress  Skin warm dry and intact.  No rashes noted.  Neck is supple without adenopathy  Chest.  Respirations are even unlabored.  Lungs are clear to auscultation.  Cardiac regular rate and rhythm.  No chest wall adenopathy noted.  Abdomen is soft and not distended.  Bowel sounds are present.  No tenderness during palpation of the abdomen.  No Hepatosplenomegaly noted.  No hernia noted.  No CVA tenderness to percussion.    Neuro. Awake alert oriented x4.  Normal judgment and cognition noted.  Extremities  no clubbing or cyanosis in the extremities.  Edema noted bilateral lower extremities.  Right ankle measures 9 3/4 inch and left Ankle measures 9/2.  Right calf measures 16-3/4 inches and left calf measures 16-1/2 inches.  Right thigh measures 23 and left thigh measures 22-1/2..     Call or return to clinic prn if these symptoms worsen or fail to improve as anticipated.    "

## 2018-05-09 NOTE — PROGRESS NOTES
"Name: Lorraine Childers  [facility] Number: [id number]  Date of Treatment: 05/09/2018   Diagnosis:   Encounter Diagnoses   Name Primary?    Left shoulder pain, unspecified chronicity Yes    Decreased ROM of left shoulder     Weakness of trunk musculature      Time in: 1035  Time Out: 1120  Total Treatment Time: 45 minutes (1:1 with PTA 30 minutes of treatment session)     Subjective:    Lorraine Fonseca reports her left shoulder was sore after last treatment session but she did feel better and "stretched." Patient states her soreness did increase last night and she needed to take half of a pain pill. Patient states her left shoulder is pain free currently. Patient reports their pain to be 0/10 on a 0-10 scale with 0 being no pain and 10 being the worst pain imaginable. Patient states the left side of her neck just feels tense. .     Objective    Patient received individual therapy to increase strength, endurance, ROM, flexibility, posture and core stabilization with activities as follows:     Lorraine Fonseca received therapeutic exercises to develop strength, endurance, ROM, flexibility, posture and core stabilization for 45 minutes including:   PROM shoulder ER at neutral ABD (0-30 degrees degrees), ABD to 80 and flexion in scapular plane (0-90 degrees) - gentle oscillations to reduce muscle guarding  Pendulums x20 seconds 3x - gentle (grade 1) oscillations by PT  Seated with sling donned   Scapular retractions 3x10 3 seconds holds   Shoulder shruggs 3x10 3 second holds   Cervical rotation 3x10 ea.    Cervical SB 3x10 ea.    Wrist flexion/extension 3x10   Wrist supination/pronation 3x10  Manual scapular strengthening 2x10 medial and inferior glide    Manual therapy consisting of STM and joint mobilization x10 minutes   STM to Left upper trap   Gentle oscillations to the L GH joint with all PROM and pendulums     Written Home Exercises Provided: reviewed from IE  Pt demo good understanding of the education provided. " Lorraine Fonseca demonstrated good return demonstration of activities.     Assessment:   Lorraine tolerated treatment session well this morning. Patient with increased tissue restriction through left upper trap this morning with improvements in tissue mobility achieved with manual therapy techniques. Patient continues with muscle guarding during PROM with cues needed for relaxation and graded motor imagery. Patient does demonstrate relaxation when prompted to perform ROM on contralateral UE. Patient with moderate upper trap activation when performing scapular retractions with cues needed for correction. Plan to continue progression L shoulder ROM and strength following post op protocol as tolerated.   Pt will continue to benefit from skilled PT intervention. Medical Necessity is demonstrated by:  Fall Risk, Unable to participate fully in daily activities, Requires skilled supervision to complete and progress HEP and Weakness.    Patient is making good progress towards established goals.    Short Term Goals (4 Weeks):   1. Pt will report 25% reduction in L shoulder pain for ease with ADL's  2. Pt will tolerate progression of PROM to protocol and surgeon established goals   3. Pt will tolerate progression of scapular strengthening from manual resistance with good results  Long Term Goals (12 Weeks):   1. Pt will demonstrate full PROM of the L shoulder without compensation   2. Pt will report compliance with post op restrictions as described on post op protocol  3. Pt will tolerated progression to AAROM of the L shoulder for improved independence with daily hygeine and body clothing  LONG term goals (24 weeks)  1. Pt will demonstrate full painfree AROM of the L shoulder for ease with ADLs  2. Pt will demonstrate full functional strength of the L shoulder in all planes for ease with return to work related activities  3. Pt will demonstrate full periscapular strength for ease with shoulder mechanics and household  chores  4. Pt will report being independent with HEP for maintenance of improvements gained during therapy sessions  Plan      Certification Period: 4/25/18 to 12/25/18  Recommended Treatment Plan: 1-2 times per week for 28 weeks: Electrical Stimulation PRN, Iontophoresis (with dexamethasone PRN), Manual Therapy, Moist Heat/ Ice, Neuromuscular Re-ed, Patient Education, Therapeutic Activites, Therapeutic Exercise and Other therapeutic taping, dry needling, aquatic therapy     Other Recommendations: PTA to be involved in pt POC.    Continue with established Plan of Care towards PT goals.     Blanquita Driver, PTA   5/9/2018

## 2018-05-16 ENCOUNTER — CLINICAL SUPPORT (OUTPATIENT)
Dept: REHABILITATION | Facility: HOSPITAL | Age: 53
End: 2018-05-16
Attending: ORTHOPAEDIC SURGERY
Payer: MEDICAID

## 2018-05-16 ENCOUNTER — OFFICE VISIT (OUTPATIENT)
Dept: FAMILY MEDICINE | Facility: CLINIC | Age: 53
End: 2018-05-16
Payer: MEDICAID

## 2018-05-16 VITALS
DIASTOLIC BLOOD PRESSURE: 68 MMHG | OXYGEN SATURATION: 97 % | HEIGHT: 67 IN | WEIGHT: 211.19 LBS | BODY MASS INDEX: 33.15 KG/M2 | TEMPERATURE: 98 F | HEART RATE: 97 BPM | SYSTOLIC BLOOD PRESSURE: 122 MMHG

## 2018-05-16 DIAGNOSIS — M25.612 DECREASED ROM OF LEFT SHOULDER: ICD-10-CM

## 2018-05-16 DIAGNOSIS — M25.512 LEFT SHOULDER PAIN, UNSPECIFIED CHRONICITY: Primary | ICD-10-CM

## 2018-05-16 DIAGNOSIS — M62.81 WEAKNESS OF TRUNK MUSCULATURE: ICD-10-CM

## 2018-05-16 DIAGNOSIS — F41.9 ANXIETY: ICD-10-CM

## 2018-05-16 DIAGNOSIS — R10.11 RUQ ABDOMINAL PAIN: ICD-10-CM

## 2018-05-16 DIAGNOSIS — R07.9 CHEST PAIN, UNSPECIFIED TYPE: Primary | ICD-10-CM

## 2018-05-16 PROCEDURE — 99213 OFFICE O/P EST LOW 20 MIN: CPT | Mod: PBBFAC,PO | Performed by: NURSE PRACTITIONER

## 2018-05-16 PROCEDURE — 97110 THERAPEUTIC EXERCISES: CPT | Mod: PN

## 2018-05-16 PROCEDURE — 99213 OFFICE O/P EST LOW 20 MIN: CPT | Mod: S$PBB,,, | Performed by: NURSE PRACTITIONER

## 2018-05-16 PROCEDURE — 99999 PR PBB SHADOW E&M-EST. PATIENT-LVL III: CPT | Mod: PBBFAC,,, | Performed by: NURSE PRACTITIONER

## 2018-05-16 RX ORDER — ESOMEPRAZOLE MAGNESIUM 40 MG/1
40 CAPSULE, DELAYED RELEASE ORAL
Qty: 30 CAPSULE | Refills: 3 | Status: SHIPPED | OUTPATIENT
Start: 2018-05-16 | End: 2019-05-16

## 2018-05-16 RX ORDER — LORAZEPAM 0.5 MG/1
0.5 TABLET ORAL EVERY 6 HOURS PRN
Qty: 90 TABLET | Refills: 2 | Status: SHIPPED | OUTPATIENT
Start: 2018-05-16 | End: 2018-06-22 | Stop reason: SDUPTHER

## 2018-05-16 NOTE — PROGRESS NOTES
This dictation has been generated using Dragon Dictation some phonetic errors may occur.     Problem List Items Addressed This Visit     Anxiety (Chronic)    Relevant Orders    Ambulatory referral to Psychiatry      Other Visit Diagnoses     Chest pain, unspecified type    -  Primary    RUQ abdominal pain              Orders Placed This Encounter    Ambulatory referral to Psychiatry    esomeprazole (NEXIUM) 40 MG capsule    LORazepam (ATIVAN) 0.5 MG tablet       Following up for results.  Has not had ultrasound.  Labs are normal.  Proceed with ultrasound.    Follow-up if symptoms worsen or fail to improve.    ________________________________________________________________  ________________________________________________________________      Chief Complaint   Patient presents with    Results     History of present illness  This 52 y.o. presents today for complaint of chest pain described as burning.  Previous cardiac workups have been negative.  Recent labs for evaluation of renal function and hepatic function were normal.  Urinalysis was unremarkable.  No anemia.  The results with patient today.  She still need to do the ultrasound.  She states intent to have that scheduled and expecting a call this PM.  Review of systems  No fever or chills  Patient denies melanoma no blood in stool  No shortness of breath or orthopnea    Past Medical History:   Diagnosis Date    Anxiety     Back problem     Diabetes mellitus type I     no meds since weight loss few years back    General anesthetics causing adverse effect in therapeutic use     sometimes hard to sedate--    Pneumonia     Thyroid disease     thyroid nodules       Past Surgical History:   Procedure Laterality Date     SECTION  x2     and     HYSTERECTOMY      Partial abd    RHINOPLASTY TIP      SHOULDER SURGERY         Family History   Problem Relation Age of Onset    Diabetes Mother     Heart disease Mother     Hyperlipidemia  "Mother     Hypertension Mother        Social History     Social History    Marital status: Single     Spouse name: N/A    Number of children: N/A    Years of education: N/A     Social History Main Topics    Smoking status: Current Every Day Smoker     Packs/day: 1.00     Types: Cigarettes    Smokeless tobacco: Never Used    Alcohol use No    Drug use: No    Sexual activity: Not Asked     Other Topics Concern    None     Social History Narrative    None       Current Outpatient Prescriptions   Medication Sig Dispense Refill    atorvastatin (LIPITOR) 40 MG tablet Take 1 tablet (40 mg total) by mouth once daily. 90 tablet 3    celecoxib (CELEBREX) 200 MG capsule Take 1 capsule (200 mg total) by mouth 2 (two) times daily. 60 capsule 1    ibuprofen (ADVIL,MOTRIN) 800 MG tablet Take 1 tablet (800 mg total) by mouth 3 (three) times daily as needed for Pain. 90 tablet 0    LORazepam (ATIVAN) 0.5 MG tablet Take 1 tablet (0.5 mg total) by mouth every 6 (six) hours as needed. 90 tablet 2    oxyCODONE-acetaminophen (PERCOCET)  mg per tablet Take 1 tablet by mouth every 12 (twelve) hours as needed for Pain. 60 tablet 0    promethazine (PHENERGAN) 25 MG tablet Take 1 tablet (25 mg total) by mouth every 12 (twelve) hours as needed for Nausea. 45 tablet 0    esomeprazole (NEXIUM) 40 MG capsule Take 1 capsule (40 mg total) by mouth before breakfast. 30 capsule 3     No current facility-administered medications for this visit.        Review of patient's allergies indicates:   Allergen Reactions    Percocet [oxycodone-acetaminophen] Nausea Only     Can take Percocet if she takes Phenergan or Zofran.    Vicodin [hydrocodone-acetaminophen]      "Makes me Hyper." Doesn't help the pain.       Physical examination  Vitals Reviewed  Gen. Well-dressed well-nourished no apparent distress  Skin warm dry and intact.  No rashes noted.  Chest.  Respirations are even unlabored.  Lungs are clear to auscultation.  Cardiac " regular rate and rhythm.  No chest wall adenopathy noted.  Abdomen is soft and not distended.  Bowel sounds are present.  No tenderness during palpation of the abdomen.  No Hepatosplenomegaly noted.  No hernia noted.  No CVA tenderness to percussion.    Neuro. Awake alert oriented x4.  Normal judgment and cognition noted.  Extremities no clubbing cyanosis or edema noted.     Call or return to clinic prn if these symptoms worsen or fail to improve as anticipated.

## 2018-05-16 NOTE — PROGRESS NOTES
Name: Lorraine Childers  Clinic Number: [id number]  Date of Treatment: 05/16/2018   Diagnosis:   Encounter Diagnoses   Name Primary?    Left shoulder pain, unspecified chronicity Yes    Decreased ROM of left shoulder     Weakness of trunk musculature      Time in: 1030  Time Out: 1115  Total Treatment Time: 45 minutes (1:1 with PT 45 minutes of treatment session)     Subjective:    Lorraine Fonseca reports she felt very good after being stretched out at last treatment session. Pt states she will feel sharp pains in her biceps region occasionally like when she is folding towels. Patient states her left shoulder is pain free currently. Patient reports their pain to be 0/10 on a 0-10 scale with 0 being no pain and 10 being the worst pain imaginable. Patient states the left side of her neck just feels tense. .     Objective    Patient received individual therapy to increase strength, endurance, ROM, flexibility, posture and core stabilization with activities as follows:     Lorraine Fonseca received therapeutic exercises to develop strength, endurance, ROM, flexibility, posture and core stabilization for 45 minutes including:   PROM shoulder ER at neutral ABD (0-30 degrees degrees), ABD to 80 and flexion in scapular plane (0-90 degrees) - gentle oscillations to reduce muscle guarding  Pendulums x20 seconds 3x - gentle (grade 1) oscillations by PT  Seated with sling donned   Scapular retractions 3x10 3 seconds holds   Shoulder shruggs 3x10 3 second holds   Cervical rotation 3x10 ea.    Cervical SB 3x10 ea.    Wrist flexion/extension 3x10   Wrist supination/pronation 3x10  Manual scapular strengthening 2x10 medial and inferior glide    Manual therapy consisting of STM and joint mobilization x10 minutes   STM to Left upper trap   Gentle oscillations to the L GH joint with all PROM and pendulums     Written Home Exercises Provided: reviewed from IE  Pt demo good understanding of the education provided. Lorraine Fonseca  demonstrated good return demonstration of activities.     Assessment:   Lorraine tolerated treatment session well this morning. Patient continues with muscle guarding during PROM with cues needed for relaxation and graded motor imagery.  Pt challenged with middle and lower trap activation due to recruitment of the upper trap, yet with god response to assisted scapular motion in sidelying position. Reviewed post operative precautions again this morning due to subjective reports of using the LUE, emphasizing how the healing process works. Plan to continue progression L shoulder ROM and strength following post op protocol as tolerated.   Pt will continue to benefit from skilled PT intervention. Medical Necessity is demonstrated by:  Fall Risk, Unable to participate fully in daily activities, Requires skilled supervision to complete and progress HEP and Weakness.    Patient is making good progress towards established goals.    Short Term Goals (4 Weeks):   1. Pt will report 25% reduction in L shoulder pain for ease with ADL's  2. Pt will tolerate progression of PROM to protocol and surgeon established goals   3. Pt will tolerate progression of scapular strengthening from manual resistance with good results  Long Term Goals (12 Weeks):   1. Pt will demonstrate full PROM of the L shoulder without compensation   2. Pt will report compliance with post op restrictions as described on post op protocol  3. Pt will tolerated progression to AAROM of the L shoulder for improved independence with daily hygeine and body clothing  LONG term goals (24 weeks)  1. Pt will demonstrate full painfree AROM of the L shoulder for ease with ADLs  2. Pt will demonstrate full functional strength of the L shoulder in all planes for ease with return to work related activities  3. Pt will demonstrate full periscapular strength for ease with shoulder mechanics and household chores  4. Pt will report being independent with HEP for maintenance of  improvements gained during therapy sessions  Plan      Certification Period: 4/25/18 to 12/25/18  Recommended Treatment Plan: 1-2 times per week for 28 weeks: Electrical Stimulation PRN, Iontophoresis (with dexamethasone PRN), Manual Therapy, Moist Heat/ Ice, Neuromuscular Re-ed, Patient Education, Therapeutic Activites, Therapeutic Exercise and Other therapeutic taping, dry needling, aquatic therapy     Other Recommendations: PTA to be involved in pt POC.    Continue with established Plan of Care towards PT goals.     Salima Engle, PT   5/16/2018

## 2018-05-23 ENCOUNTER — TELEPHONE (OUTPATIENT)
Dept: SPORTS MEDICINE | Facility: CLINIC | Age: 53
End: 2018-05-23

## 2018-05-23 ENCOUNTER — CLINICAL SUPPORT (OUTPATIENT)
Dept: REHABILITATION | Facility: HOSPITAL | Age: 53
End: 2018-05-23
Attending: ORTHOPAEDIC SURGERY
Payer: MEDICAID

## 2018-05-23 DIAGNOSIS — M25.612 DECREASED ROM OF LEFT SHOULDER: ICD-10-CM

## 2018-05-23 DIAGNOSIS — G89.29 CHRONIC LEFT SHOULDER PAIN: ICD-10-CM

## 2018-05-23 DIAGNOSIS — M62.89 MUSCLE TIGHTNESS: ICD-10-CM

## 2018-05-23 DIAGNOSIS — M25.512 CHRONIC LEFT SHOULDER PAIN: ICD-10-CM

## 2018-05-23 DIAGNOSIS — R29.3 POSTURE IMBALANCE: ICD-10-CM

## 2018-05-23 DIAGNOSIS — M54.2 NECK PAIN: ICD-10-CM

## 2018-05-23 PROCEDURE — 97110 THERAPEUTIC EXERCISES: CPT | Mod: PN

## 2018-05-23 NOTE — PROGRESS NOTES
Name: Lorraine Childers  Clinic Number: [id number]  Date of Treatment: 05/23/2018   Diagnosis:   Encounter Diagnoses   Name Primary?    Decreased ROM of left shoulder     Neck pain     Chronic left shoulder pain     Posture imbalance     Muscle tightness      Time in: 1030  Time Out: 1115  Total Treatment Time: 45 minutes (1:1 with PTA 45 minutes of treatment session)     From Post operative note:  Medium size Rotator cuff protocol  Biceps tenodesis was performed; if tenodesis was performed limit aggressive biceps flexion exercises for first 6 weeks     Use Sling and Abduction Pillow - For  6 weeks then discontinue pillow at 6 weeks time to protect rotator cuff repair and biceps tenodesis. Discontinue all immobilization in 6 weeks. Remove arm from sling immediately and move elbow and wrist as tolerated.     Cuff specific program: Pendulum exercises and Codman's exercises in 3-5 days protecting rotator cuff repair for 6 weeks by avoiding active motion program until 2-3 weeks.     PASSIVE ROM: no motion for 3 weeks, ER side 45 degrees, Forward Flex: 180 degrees, ABD: 90 degrees   Full AAROM/PROM starting at 4-6 weeks as tolerated     Subjective:    Lorraine Fonseca reports her left shoulder has been more sore than usual. Patient states she is also more swollen. Patient states she has been using her arm at home to sweep the floor and fold laundry. Patient states she has also been sleeping without her sling the last 4 nights. Patient reports their pain to be 5/10 on a 0-10 scale with 0 being no pain and 10 being the worst pain imaginable. Patient states the left side of her neck just feels tense.     Objective    PROM ER at 20 degrees of abduction: 39 degrees     Patient received individual therapy to increase strength, endurance, ROM, flexibility, posture and core stabilization with activities as follows:     Lorraine Fonseca received therapeutic exercises to develop strength, endurance, ROM, flexibility, posture and  core stabilization for 45 minutes including:   PROM shoulder: flexion ( degrees) ER at 20 degrees of abduction (15-40 degrees)  Pendulums x20 seconds 3x - gentle (grade 1) oscillations by PT  +SL scapular retractions 2x10, 3 sec hold   Seated with sling donned   Scapular retractions 3x10 3 seconds holds   Cervical rotation 3x10 ea.    Cervical SB 3x10 ea.   Manual scapular strengthening 2x10 medial and inferior glide    Manual therapy consisting of STM and joint mobilization x 10 minutes   STM to Left upper trap   Gentle oscillations to the L GH joint with all PROM and pendulums     Written Home Exercises Provided: reviewed from IE  Pt demo good understanding of the education provided. Lorraine Fonseca demonstrated good return demonstration of activities.     Assessment:   Lorraine tolerated treatment session well this morning. Patient presents to clinic more sore than usual this morning with empty end feel noted with all directions of PROM. Patient very tender with direct palpation to pectoral and upper trap musculature likely due to continued compensation. Patient continues to require heavy verbal and tactile cues to promote muscle relaxation with muscle guarding noted with PROM. Reviewed post operative precautions again this morning due to subjective reports of using the LUE, emphasizing how the healing process works. Patient reported understanding of instructions provided. Plan to continue progression L shoulder ROM and strength following post op protocol as tolerated.   Pt will continue to benefit from skilled PT intervention. Medical Necessity is demonstrated by:  Fall Risk, Unable to participate fully in daily activities, Requires skilled supervision to complete and progress HEP and Weakness.    Patient is making good progress towards established goals.    Short Term Goals (4 Weeks):   1. Pt will report 25% reduction in L shoulder pain for ease with ADL's  2. Pt will tolerate progression of PROM to  protocol and surgeon established goals   3. Pt will tolerate progression of scapular strengthening from manual resistance with good results  Long Term Goals (12 Weeks):   1. Pt will demonstrate full PROM of the L shoulder without compensation   2. Pt will report compliance with post op restrictions as described on post op protocol  3. Pt will tolerated progression to AAROM of the L shoulder for improved independence with daily hygeine and body clothing  LONG term goals (24 weeks)  1. Pt will demonstrate full painfree AROM of the L shoulder for ease with ADLs  2. Pt will demonstrate full functional strength of the L shoulder in all planes for ease with return to work related activities  3. Pt will demonstrate full periscapular strength for ease with shoulder mechanics and household chores  4. Pt will report being independent with HEP for maintenance of improvements gained during therapy sessions  Plan      Certification Period: 4/25/18 to 12/25/18  Recommended Treatment Plan: 1-2 times per week for 28 weeks: Electrical Stimulation PRN, Iontophoresis (with dexamethasone PRN), Manual Therapy, Moist Heat/ Ice, Neuromuscular Re-ed, Patient Education, Therapeutic Activites, Therapeutic Exercise and Other therapeutic taping, dry needling, aquatic therapy     Other Recommendations: PTA to be involved in pt POC.    Continue with established Plan of Care towards PT goals.     Blanquita Driver, PTA   5/23/2018

## 2018-05-23 NOTE — TELEPHONE ENCOUNTER
----- Message from Leni Scott sent at 5/23/2018  9:02 AM CDT -----  Contact: self   Pt is requesting a call back to schedule a sooner appt regarding pain and swelling on her rotator cuff. Pt stated she had surgery and is concerned about still having the pain. Pt can be reached at [phone].

## 2018-05-28 ENCOUNTER — OFFICE VISIT (OUTPATIENT)
Dept: SPORTS MEDICINE | Facility: CLINIC | Age: 53
End: 2018-05-28
Payer: MEDICAID

## 2018-05-28 VITALS
DIASTOLIC BLOOD PRESSURE: 78 MMHG | BODY MASS INDEX: 33.12 KG/M2 | HEART RATE: 85 BPM | WEIGHT: 211 LBS | SYSTOLIC BLOOD PRESSURE: 112 MMHG | HEIGHT: 67 IN

## 2018-05-28 DIAGNOSIS — M25.612 DECREASED ROM OF LEFT SHOULDER: ICD-10-CM

## 2018-05-28 DIAGNOSIS — M25.512 CHRONIC LEFT SHOULDER PAIN: Primary | ICD-10-CM

## 2018-05-28 DIAGNOSIS — M19.012 ARTHRITIS OF LEFT ACROMIOCLAVICULAR JOINT: ICD-10-CM

## 2018-05-28 DIAGNOSIS — M75.102 ROTATOR CUFF SYNDROME, LEFT: ICD-10-CM

## 2018-05-28 DIAGNOSIS — G89.29 CHRONIC LEFT SHOULDER PAIN: Primary | ICD-10-CM

## 2018-05-28 DIAGNOSIS — M75.122 COMPLETE TEAR OF LEFT ROTATOR CUFF: ICD-10-CM

## 2018-05-28 DIAGNOSIS — M75.22 BICEPS TENDONITIS ON LEFT: ICD-10-CM

## 2018-05-28 DIAGNOSIS — M75.102 ROTATOR CUFF SYNDROME OF LEFT SHOULDER: ICD-10-CM

## 2018-05-28 PROCEDURE — 97110 THERAPEUTIC EXERCISES: CPT | Mod: PBBFAC,PO | Performed by: ORTHOPAEDIC SURGERY

## 2018-05-28 PROCEDURE — 99024 POSTOP FOLLOW-UP VISIT: CPT | Mod: ,,, | Performed by: ORTHOPAEDIC SURGERY

## 2018-05-28 PROCEDURE — 99999 PR PBB SHADOW E&M-EST. PATIENT-LVL IV: CPT | Mod: PBBFAC,,, | Performed by: ORTHOPAEDIC SURGERY

## 2018-05-28 PROCEDURE — 99214 OFFICE O/P EST MOD 30 MIN: CPT | Mod: PBBFAC,PO | Performed by: ORTHOPAEDIC SURGERY

## 2018-05-28 RX ORDER — PROMETHAZINE HYDROCHLORIDE 25 MG/1
25 TABLET ORAL EVERY 12 HOURS PRN
Qty: 45 TABLET | Refills: 0 | Status: SHIPPED | OUTPATIENT
Start: 2018-05-28 | End: 2018-07-11

## 2018-05-28 NOTE — PROGRESS NOTES
Subjective:          Chief Complaint: Lorraine Childers is a 52 y.o. female who had concerns including Follow-up of the Left Shoulder.    Patient is here for a follow up for her left shoulder. She is doing PT at [facility].    She went to the ED with shortness of breath after surgery. She was cleared of a blood clot. She was prescribed antibiotics by her NP and is feeling better. She is doing ok, working in PT      DATE OF PROCEDURE: 4/20/2018     ATTENDING SURGEON: Surgeon(s) and Role:     * Susanna Vann MD - Primary  Assistants:  Juan Luis Smith MD-Fellow  Lisette Akbar PA-C-Assistant     PREOPERATIVE DIAGNOSIS:  Left shoulder   Tendinitis, Biceps M75.20 A-C Arthritis M12.9, Rotator Cuff Syndrome/Disorder  M75.100 and Tear, Rotator Cuff, Non-traumatic M75.100     POSTOPERATIVE DIAGNOSIS: Left shoulder   Tendinitis, Biceps M75.20 A-C Arthritis M12.9, Rotator Cuff Syndrome/Disorder  M75.100, Tear, Rotator Cuff, Non-traumatic M75.100 and Tendinitis, Biceps M75.20            PROCEDURES PERFORMED:  1. Left shoulder Arthroscopic rotator cuff repair CPT - 77521     2. Left shoulder Biceps tenodesis CPT - 33336     3. Left shoulder Arthroscopic distal clavicle excision CPT - 20432     4. Left shoulder Amniox Arthrocentesis, 22212      Handedness: right-handed  Sport played:    Level:            Pain   This is a chronic problem. The current episode started more than 1 month ago. The problem occurs intermittently. The problem has been rapidly worsening. Associated symptoms include joint swelling. Pertinent negatives include no chest pain, fever, numbness or rash. The symptoms are aggravated by exertion. The treatment provided no relief.       Review of Systems   Constitution: Negative for fever and night sweats.   HENT: Negative for hearing loss.    Eyes: Negative for blurred vision and visual disturbance.   Cardiovascular: Negative for chest pain and leg swelling.   Respiratory: Negative for shortness of breath.     Endocrine: Negative for polyuria.   Hematologic/Lymphatic: Negative for bleeding problem.   Skin: Negative for rash.   Musculoskeletal: Positive for joint pain and joint swelling. Negative for back pain, muscle cramps and muscle weakness.   Gastrointestinal: Negative for melena.   Genitourinary: Negative for hematuria.   Neurological: Negative for loss of balance, numbness and paresthesias.   Psychiatric/Behavioral: Negative for altered mental status.                   Objective:        General: Lorraine Fonseca is well-developed, well-nourished, appears stated age, in no acute distress, alert and oriented to time, place and person.     General    Vitals reviewed.  Constitutional: She is oriented to person, place, and time. She appears well-developed and well-nourished. No distress.   HENT:   Nose: Nose normal.   Mouth/Throat: No oropharyngeal exudate.   Eyes: Pupils are equal, round, and reactive to light. Right eye exhibits no discharge. Left eye exhibits no discharge.   Neck: Normal range of motion.   Cardiovascular: Normal rate and intact distal pulses.    Pulmonary/Chest: Effort normal and breath sounds normal. No respiratory distress.   Neurological: She is alert and oriented to person, place, and time. She has normal reflexes. She displays normal reflexes. No cranial nerve deficit. Coordination normal.   Psychiatric: She has a normal mood and affect. Her behavior is normal. Judgment and thought content normal.         Right Shoulder Exam     Inspection/Observation   Swelling: absent  Bruising: absent  Scars: absent  Deformity: absent  Scapular Winging: absent  Scapular Dyskinesia: negative  Atrophy: absent    Tenderness   The patient is experiencing no tenderness.        Range of Motion   Active Abduction:  90 normal   Passive Abduction:  100 normal   Extension:  0 normal   Forward Flexion:  180 normal   Forward Elevation: 180 normal  Adduction: 40 normal  External Rotation 0 degrees:  50 normal   External  Rotation 90 degrees: 90 normal  Internal Rotation 0 degrees:  T8 normal   Internal Rotation 90 degrees:  30 normal     Tests & Signs   Apprehension: negative  Cross Arm: negative  Drop Arm: negative  Hawkin's test: negative  Impingement: negative  Sulcus: absent  Anterosuperior Escape: negative  Lag Sign 0 degrees: negative  Lag Sign 90 degrees: negative  Lift Off Sign: negative  Belly Press: negative  Active Compression Test (Maspeth's Sign): negative  Yergason's Test: negative  Speed's Test: negative  Anterior Drawer Test: 1+   Posterior Drawer Test: 1+  Relocation 90 degrees: negative  Relocation > 90 degrees: negative  Bear Hug: negative  Moving Valgus: negative  Jerk Test: negative    Other   Sensation: normal    Left Shoulder Exam     Inspection/Observation   Swelling: absent  Bruising: absent  Scars: present  Deformity: absent  Scapular Winging: absent  Scapular Dyskinesia: negative  Atrophy: absent    Tenderness   The patient is tender to palpation of the greater tuberosity and acromioclavicular joint.    Range of Motion   Active Abduction:  90 abnormal   Passive Abduction:  90 abnormal   Extension:  0 normal   Forward Flexion:  120 abnormal   Forward Elevation: 120 abnormal  Adduction: 40 normal  External Rotation 0 degrees:  30 abnormal   External Rotation 90 degrees: 20 abnormal  Internal Rotation 0 degrees: abnormal Left shoulder internal rotation 0 degrees: body.   Internal Rotation 90 degrees:  0 abnormal     Tests & Signs   Apprehension: negative  Cross Arm: negative  Drop Arm: negative  Hawkin's test: negative  Impingement: negative  Sulcus: absent  Rotator Cuff Painful Arc/Range: moderate  Anterosuperior Escape: negative  Lag Sign 0 degrees: negative  Lag Sign 90 degrees: negative  Lift Off Sign: negative  Belly Press: negative  Active Compression test (Maspeth's Sign): negative  Yergasons's Test: negative  Speed's Test: negative  Anterior Drawer Test: 1+  Posterior Drawer Test: 1+  Relocation 90  degrees: negative  Relocation > 90 degrees: negative  Bear Hug: negative  Moving Valgus: negative  Jerk Test: negative    Other   Sensation: normal     Comments:  Incisions healed      Muscle Strength   Right Upper Extremity   Shoulder Abduction: 5/5   Shoulder Internal Rotation: 5/5   Shoulder External Rotation: 5/5   Supraspinatus: 5/5/5   Subscapularis: 5/5/5   Biceps: 5/5/5   Left Upper Extremity  Shoulder Abduction: 5/5   Shoulder Internal Rotation: 5/5   Shoulder External Rotation: 4/5   Supraspinatus: 4/5/5   Subscapularis: 4/5/5   Biceps: 5/5/5     Reflexes     Left Side  Biceps:  2+  Triceps:  2+  Brachioradialis:  2+    Right Side   Biceps:  2+  Triceps:  2+  Brachioradialis:  2+    Vascular Exam     Right Pulses      Radial:                    2+      Left Pulses      Radial:                    2+      Capillary Refill  Right Hand: normal capillary refill  Left Hand: normal capillary refill  Medium size Rotator cuff protocol   Biceps tenodesis was performed; if tenodesis was performed limit aggressive biceps flexion exercises for first 6 weeks     Start Therapy in 3-5 days     Use Sling and Abduction Pillow - For  6 weeks then discontinue pillow at 6 weeks time to protect rotator cuff repair and biceps tenodesis.   Discontinue all immobilization in 6 weeks. Remove arm from sling immediately and move elbow and wrist as tolerated.     Cuff specific program:  Pendulum exercises and Codman's exercises in 3-5 days protecting rotator cuff repair for 6 weeks by avoiding active motion program until 2-3 weeks.     PASSIVE ROM: no motion for 3 weeks, ER side 45 degrees, Forward Flex 180 degrees, ABD - 90 degrees   Full AAROM/PROM starting at 4-6 weeks as tolerated           Assessment:       Encounter Diagnoses   Name Primary?    Chronic left shoulder pain Yes    Rotator cuff syndrome of left shoulder     Decreased ROM of left shoulder           Plan:       1. ASES and SF-12 was not filled out today in clinic.      RTC in 6 weeks with Dr. Susanna Vann. Patient will fill out ASES, SF-12 and left shoulder series on return.    2. Continue PT at [facility]    3. Dc sling and pillow.    4. Take medications as prescribed    5. HEP 96784 - Susanna Vann MD and SMA Leo, instructed and demonstrated a periscapular and AAROM HEP. The patient then demonstrated understanding of exercises and proper technique. This program was performed for 20 minutes.                     [facility] patient questionnaires have been collected today.

## 2018-05-28 NOTE — PATIENT INSTRUCTIONS
Left shoulder goals:  180 degrees forward flexion  ER side 60 degrees  IR 90 30 degree  ER 90 90 degrees    Periscapular program allowed  Codman's  Aquatic exercise good as well

## 2018-06-06 ENCOUNTER — CLINICAL SUPPORT (OUTPATIENT)
Dept: REHABILITATION | Facility: HOSPITAL | Age: 53
End: 2018-06-06
Attending: ORTHOPAEDIC SURGERY
Payer: MEDICAID

## 2018-06-06 DIAGNOSIS — G89.29 CHRONIC LEFT SHOULDER PAIN: ICD-10-CM

## 2018-06-06 DIAGNOSIS — M25.512 CHRONIC LEFT SHOULDER PAIN: ICD-10-CM

## 2018-06-06 DIAGNOSIS — M25.612 DECREASED ROM OF LEFT SHOULDER: Primary | ICD-10-CM

## 2018-06-06 PROCEDURE — 97110 THERAPEUTIC EXERCISES: CPT | Mod: PN | Performed by: PHYSICAL THERAPIST

## 2018-06-06 NOTE — PROGRESS NOTES
Name: Lorraine Childers  Clinic Number: [id number]  Date of Treatment: 06/06/2018   Diagnosis:   Encounter Diagnoses   Name Primary?    Decreased ROM of left shoulder Yes    Chronic left shoulder pain      Time in: 1035  Time Out: 1100  Total Treatment Time: 55 minutes (1:1 with PT 30 minutes of treatment session)     Visit: 6/20, exp 12/31/2018  Precautions:Medium size Rotator cuff protocol & Biceps tenodesis  POW: 6    Subjective:    Lorraine Fonseca reports seeing the Dr last week and was told that she is stiffer than he would like. She was told she can DC the sling and given exercises she could do in the pool for vacation. She has been doing them everyday. Patient reports their pain to be 5/10 on a 0-10 scale with 0 being no pain and 10 being the worst pain imaginable. Patient states the left side of her neck just feels tense.     Objective    PROM ER at 20 degrees of abduction: 35 degrees post RX    Patient received individual therapy to increase strength, endurance, ROM, flexibility, posture and core stabilization with activities as follows:     Lorraine Fonseca received therapeutic exercises to develop strength, endurance, ROM, flexibility, posture and core stabilization for 40 minutes including:   PROM shoulder x 5 min as tolerated  +supine ER prop at 20 deg abduction 3 min 1#, 45 deg abduction 1/2# 3 min  +Shoulder ER AAROM with dowel x 20  +shoulder flexion AAROM with dowel x 20  Pendulums x20 seconds 3x  SL scapular retractions 2x10, 3 sec hold   Seated:   Scapular retractions 3x10 3 seconds holds   Cervical rotation 3x10 ea.    Cervical SB 3x10 ea.   Manual scapular strengthening 2x10 retraction and depression    Manual therapy consisting of STM and joint mobilization x 10 minutes   STM to Left upper trap   Gentle oscillations to the L GH joint with all PROM and prone pendulums     Written Home Exercises Provided: reviewed from IE  Pt demo good understanding of the education provided. Lorraine Fonseca  demonstrated good return demonstration of activities.     Assessment:   Lorraine tolerated treatment session well. Pt demonstrating joint stiffness with decreased inferior glenohumeral glide with shoulder abduction and compensatory shoulder hike. Pt also with decreased shoulder ER since last visit. Initiated positional holds for improved joint mobility and functional reach. Good tolerance to progression of AAROM per protocol. Plan to continue progression L shoulder ROM and strength as tolerated. Medical Necessity is demonstrated by:  Fall Risk, Unable to participate fully in daily activities, Requires skilled supervision to complete and progress HEP and Weakness.    Patient is making good progress towards established goals.    Short Term Goals (4 Weeks):   1. Pt will report 25% reduction in L shoulder pain for ease with ADL's  2. Pt will tolerate progression of PROM to protocol and surgeon established goals   3. Pt will tolerate progression of scapular strengthening from manual resistance with good results  Long Term Goals (12 Weeks):   1. Pt will demonstrate full PROM of the L shoulder without compensation   2. Pt will report compliance with post op restrictions as described on post op protocol  3. Pt will tolerated progression to AAROM of the L shoulder for improved independence with daily hygeine and body clothing  LONG term goals (24 weeks)  1. Pt will demonstrate full painfree AROM of the L shoulder for ease with ADLs  2. Pt will demonstrate full functional strength of the L shoulder in all planes for ease with return to work related activities  3. Pt will demonstrate full periscapular strength for ease with shoulder mechanics and household chores  4. Pt will report being independent with HEP for maintenance of improvements gained during therapy sessions  Plan      Certification Period: 4/25/18 to 12/25/18  Recommended Treatment Plan: 1-2 times per week for 28 weeks: Electrical Stimulation PRN, Iontophoresis  (with dexamethasone PRN), Manual Therapy, Moist Heat/ Ice, Neuromuscular Re-ed, Patient Education, Therapeutic Activites, Therapeutic Exercise and Other therapeutic taping, dry needling, aquatic therapy     Other Recommendations: PTA to be involved in pt POC.    Continue with established Plan of Care towards PT goals.     Delmy Johnson, PT   6/6/2018

## 2018-06-11 ENCOUNTER — TELEPHONE (OUTPATIENT)
Dept: REHABILITATION | Facility: HOSPITAL | Age: 53
End: 2018-06-11

## 2018-06-13 ENCOUNTER — CLINICAL SUPPORT (OUTPATIENT)
Dept: REHABILITATION | Facility: HOSPITAL | Age: 53
End: 2018-06-13
Attending: ORTHOPAEDIC SURGERY
Payer: MEDICAID

## 2018-06-13 DIAGNOSIS — M25.612 DECREASED ROM OF LEFT SHOULDER: Primary | ICD-10-CM

## 2018-06-13 DIAGNOSIS — G89.29 CHRONIC LEFT SHOULDER PAIN: ICD-10-CM

## 2018-06-13 DIAGNOSIS — M25.512 CHRONIC LEFT SHOULDER PAIN: ICD-10-CM

## 2018-06-13 PROCEDURE — 97110 THERAPEUTIC EXERCISES: CPT | Mod: PN | Performed by: PHYSICAL THERAPIST

## 2018-06-13 NOTE — PROGRESS NOTES
"Name: Lorraine Childers  Clinic Number: [id number]  Date of Treatment: 06/13/2018   Diagnosis:   Encounter Diagnoses   Name Primary?    Decreased ROM of left shoulder Yes    Chronic left shoulder pain      Time in: 1030  Time Out: 1135  Total Treatment Time: 55 minutes (1:1 with PT 30 minutes of treatment session)     Visit: 7/20, exp 12/31/2018  Precautions:Medium size Rotator cuff protocol & Biceps tenodesis  POW: 7    Subjective:    Lorraine Fonseca reports not being able to come to to therapy the last two appointments because of work. Patient reports their pain to be 5/10 on a 0-10 scale with 0 being no pain and 10 being the worst pain imaginable. Patient's main complaint is tightness in her neck. "feels like a rock". The only exercises she is doing at home is the neck stretch and retractions. She forgot about the shoulder stretches.     Objective    Patient received individual therapy to increase strength, endurance, ROM, flexibility, posture and core stabilization with activities as follows:     Lorraine Fonseca received therapeutic exercises to develop strength, endurance, ROM, flexibility, posture and core stabilization for 50 minutes including:   PROM shoulder x 5 min as tolerated  supine ER prop at 20 deg abduction 3 min 1#, 45 deg abduction 1/2# 3 min  Shoulder ER AAROM with dowel x 20  shoulder flexion AAROM with dowel x 20  Pendulums x20 seconds 3x  SL scapular retractions 2x10, 3 sec hold   Seated:   Scapular retractions 3x10 3 seconds holds   Cervical rotation 3x10 ea.    Cervical SB 3x10 ea.    +table slides 3 min   +pulley's flexion 2 min  Manual scapular strengthening 2x10 retraction and depression    Manual therapy consisting of STM and joint mobilization x 10 minutes   STM to Left upper trap   Gentle oscillations to the L GH joint with all PROM and prone pendulums     Patient received 10 minutes ice to L shoulder propped in abduction    Written Home Exercises Provided: reviewed from IE  Pt demo " good understanding of the education provided. Lorraine Fonseca demonstrated good return demonstration of activities.     Assessment:   Lorraine tolerated treatment session well with reduction in pain reported post RX. Pt demonstrating poor compliance with HEP and therapy attendance. PT emphasized compliance with stretches and positioning for post operative stiffness. Plan to continue progression L shoulder ROM and strength as tolerated. Medical Necessity is demonstrated by:  Fall Risk, Unable to participate fully in daily activities, Requires skilled supervision to complete and progress HEP and Weakness.    Patient is making good progress towards established goals.    Short Term Goals (4 Weeks):   1. Pt will report 25% reduction in L shoulder pain for ease with ADL's  2. Pt will tolerate progression of PROM to protocol and surgeon established goals   3. Pt will tolerate progression of scapular strengthening from manual resistance with good results  Long Term Goals (12 Weeks):   1. Pt will demonstrate full PROM of the L shoulder without compensation   2. Pt will report compliance with post op restrictions as described on post op protocol  3. Pt will tolerated progression to AAROM of the L shoulder for improved independence with daily hygeine and body clothing  LONG term goals (24 weeks)  1. Pt will demonstrate full painfree AROM of the L shoulder for ease with ADLs  2. Pt will demonstrate full functional strength of the L shoulder in all planes for ease with return to work related activities  3. Pt will demonstrate full periscapular strength for ease with shoulder mechanics and household chores  4. Pt will report being independent with HEP for maintenance of improvements gained during therapy sessions  Plan      Certification Period: 4/25/18 to 12/25/18  Recommended Treatment Plan: 1-2 times per week for 28 weeks: Electrical Stimulation PRN, Iontophoresis (with dexamethasone PRN), Manual Therapy, Moist Heat/ Ice,  Neuromuscular Re-ed, Patient Education, Therapeutic Activites, Therapeutic Exercise and Other therapeutic taping, dry needling, aquatic therapy     Other Recommendations: PTA to be involved in pt POC.    Continue with established Plan of Care towards PT goals.     Delmy Johnson, PT   6/13/2018

## 2018-06-18 ENCOUNTER — CLINICAL SUPPORT (OUTPATIENT)
Dept: REHABILITATION | Facility: HOSPITAL | Age: 53
End: 2018-06-18
Attending: ORTHOPAEDIC SURGERY
Payer: MEDICAID

## 2018-06-18 DIAGNOSIS — M25.612 DECREASED ROM OF LEFT SHOULDER: Primary | ICD-10-CM

## 2018-06-18 DIAGNOSIS — G89.29 CHRONIC LEFT SHOULDER PAIN: ICD-10-CM

## 2018-06-18 DIAGNOSIS — M25.512 CHRONIC LEFT SHOULDER PAIN: ICD-10-CM

## 2018-06-18 PROCEDURE — 97110 THERAPEUTIC EXERCISES: CPT | Mod: PN

## 2018-06-18 NOTE — PROGRESS NOTES
Name: Lorraine Childers  Clinic Number: [id number]  Date of Treatment: 06/18/2018   Diagnosis:   Encounter Diagnoses   Name Primary?    Decreased ROM of left shoulder Yes    Chronic left shoulder pain      Time in: 1000  Time Out: 1051  Total Treatment Time: 46 minutes (1:1 with PTA for 38 minutes of treatment session)     Visit: 8 of 20, exp 12/31/2018  Precautions:Medium size Rotator cuff protocol & Biceps tenodesis  POW: 8 (surgery on 4/20/2018)    Subjective:    Lorraine Fonseca reports that she has been doing her exercises at home, but that she is still in a lot of pain. Patient notes that the majority of her pain is local to the area that is swollen (patient refers to proximal biceps head). Patient states that she tape to her upper trap helps a lot with her pain. Patient notes that she is now able to fix her hair. Patient reports their pain to be 3-4/10 on a 0-10 scale with 0 being no pain and 10 being the worst pain imaginable.     Objective    Patient received individual therapy to increase strength, endurance, ROM, flexibility, posture and core stabilization with activities as follows:     Lorraine Fonseca received therapeutic exercises to develop strength, endurance, ROM, flexibility, posture and core stabilization for 36 minutes including:   PROM shoulder x 5 min as tolerated  Supine ER prop at 20 deg abduction x 3 min 1#, 45 deg abduction 1/2# x 3 min  Shoulder ER AAROM with dowel x 20  Shoulder flexion AAROM with dowel x 20  Pendulums x20 seconds 3x  SL scapular retractions 2x10, 3 sec hold   Seated:   Scapular retractions 3 x 10 x 3 seconds holds   Cervical rotation 3x10 ea.    Cervical SB 3 x 10 ea.    Table slides x 3 min   Pulley's flexion x 3 min  Manual scapular strengthening 2x10 retraction and depression    Manual therapy consisting of STM and joint mobilization x 5 minutes   STM to Left upper trap   Gentle oscillations to the L GH joint with all PROM and prone pendulums     Patient received 5  "minutes ice to L shoulder propped in abduction.    Kinesio tape applied to Left upper trap for muscle inhibition using 2" I-strip. Patient was educated on removal of tape should irritation occur. Preparation and application of tape x 5 minutes.    Written Home Exercises Provided: reviewed from IE  Pt demo good understanding of the education provided. Lorraine Fonseca demonstrated good return demonstration of activities.     Assessment:   Lorraine tolerated treatment session well today with no exacerbation of Left shoulder pain. Good tolerance to exercises performed reporting appropriate training effect. Patient with increased stiffness throughout Left shoulder during PROM. Patient was educated on compliance with HEP to continue improving shoulder ROM as well as ice PRN. Patient verbal understanding. Medical Necessity is demonstrated by:  Fall Risk, Unable to participate fully in daily activities, Requires skilled supervision to complete and progress HEP and Weakness.    Patient is making good progress towards established goals.    Short Term Goals (4 Weeks):   1. Pt will report 25% reduction in L shoulder pain for ease with ADL's  2. Pt will tolerate progression of PROM to protocol and surgeon established goals   3. Pt will tolerate progression of scapular strengthening from manual resistance with good results  Long Term Goals (12 Weeks):   1. Pt will demonstrate full PROM of the L shoulder without compensation   2. Pt will report compliance with post op restrictions as described on post op protocol  3. Pt will tolerated progression to AAROM of the L shoulder for improved independence with daily hygeine and body clothing  LONG term goals (24 weeks)  1. Pt will demonstrate full painfree AROM of the L shoulder for ease with ADLs  2. Pt will demonstrate full functional strength of the L shoulder in all planes for ease with return to work related activities  3. Pt will demonstrate full periscapular strength for ease with " shoulder mechanics and household chores  4. Pt will report being independent with HEP for maintenance of improvements gained during therapy sessions  Plan      Certification Period: 4/25/18 to 12/25/18  Recommended Treatment Plan: 1-2 times per week for 28 weeks: Electrical Stimulation PRN, Iontophoresis (with dexamethasone PRN), Manual Therapy, Moist Heat/ Ice, Neuromuscular Re-ed, Patient Education, Therapeutic Activites, Therapeutic Exercise and Other therapeutic taping, dry needling, aquatic therapy     Other Recommendations: PTA to be involved in pt POC.    Continue with established Plan of Care towards PT goals.     Bety Castle, PTA   6/18/2018

## 2018-06-20 DIAGNOSIS — M75.22 BICEPS TENDONITIS ON LEFT: ICD-10-CM

## 2018-06-20 DIAGNOSIS — M75.122 COMPLETE TEAR OF LEFT ROTATOR CUFF: ICD-10-CM

## 2018-06-20 DIAGNOSIS — M19.012 ARTHRITIS OF LEFT ACROMIOCLAVICULAR JOINT: ICD-10-CM

## 2018-06-20 DIAGNOSIS — M75.102 ROTATOR CUFF SYNDROME, LEFT: ICD-10-CM

## 2018-06-20 NOTE — TELEPHONE ENCOUNTER
----- Message from Sarahi Kinney MA sent at 6/20/2018 10:02 AM CDT -----  Contact: [email or web address]      ----- Message -----  From: Tana Quick  Sent: 6/20/2018   9:50 AM  To: Edwar MURPHY Staff    Patient states she would like something for pain for her left shoulder please call patient.

## 2018-06-21 RX ORDER — OXYCODONE AND ACETAMINOPHEN 10; 325 MG/1; MG/1
1 TABLET ORAL EVERY 12 HOURS PRN
Qty: 40 TABLET | Refills: 0 | Status: SHIPPED | OUTPATIENT
Start: 2018-06-21 | End: 2018-07-11

## 2018-06-22 RX ORDER — LORAZEPAM 0.5 MG/1
TABLET ORAL
Qty: 90 TABLET | Refills: 3 | Status: SHIPPED | OUTPATIENT
Start: 2018-06-22 | End: 2019-04-10 | Stop reason: SDUPTHER

## 2018-06-28 ENCOUNTER — DOCUMENTATION ONLY (OUTPATIENT)
Dept: REHABILITATION | Facility: HOSPITAL | Age: 53
End: 2018-06-28

## 2018-06-28 NOTE — PROGRESS NOTES
Physical Therapy   Documentation Only    Lorraine Childers   MRN: [id number]      called pt in regards to no show for today's appointment and to reschedule future appointments. No answer and no option to leave message, mailbox full.    Total to date:  No Shows: 6  Cancels: 3    Delmy Johnson, PT  6/28/2018

## 2018-07-06 ENCOUNTER — CLINICAL SUPPORT (OUTPATIENT)
Dept: REHABILITATION | Facility: HOSPITAL | Age: 53
End: 2018-07-06
Attending: ORTHOPAEDIC SURGERY
Payer: MEDICAID

## 2018-07-06 DIAGNOSIS — G89.29 CHRONIC LEFT SHOULDER PAIN: ICD-10-CM

## 2018-07-06 DIAGNOSIS — M25.512 CHRONIC LEFT SHOULDER PAIN: ICD-10-CM

## 2018-07-06 DIAGNOSIS — M25.612 DECREASED ROM OF LEFT SHOULDER: Primary | ICD-10-CM

## 2018-07-06 PROCEDURE — 97110 THERAPEUTIC EXERCISES: CPT | Mod: PN

## 2018-07-06 NOTE — PROGRESS NOTES
Name: Lorraine Childers  Clinic Number: [id number]  Date of Treatment: 07/06/2018   Diagnosis:   Encounter Diagnoses   Name Primary?    Decreased ROM of left shoulder Yes    Chronic left shoulder pain      Time in: 0825 (patient 25 minutes late)   Time Out: 0905  Total Treatment Time: 40 minutes (1:1 with PTA for 30 minutes of treatment session)      Visit: 9 of 20, exp 12/31/2018  Precautions:Medium size Rotator cuff protocol & Biceps tenodesis  POW: 11 (surgery on 4/20/2018)    Subjective:    Lorraine Fonseca reports that she has not attended therapy due to work and a very busy schedule. Patient states she is moving more and more and she feels like it is loosening up because she is performing her HEP everyday. Patient states she was able to reach out in front of her and plug in a fan without difficulty. Patient states her swelling has improved over the last few weeks. Patient reports their pain to be 2/10 on a 0-10 scale with 0 being no pain and 10 being the worst pain imaginable.     Objective    Patient received individual therapy to increase strength, endurance, ROM, flexibility, posture and core stabilization with activities as follows:     Lorraine Fonseca received therapeutic exercises to develop strength, endurance, ROM, flexibility, posture and core stabilization for 36 minutes including:   PROM shoulder x 5 min as tolerated (all directions)   Supine ER prop at 80 degres abduction x 3 minutes x 1#   Shoulder flexion AAROM with dowel x 20, 5 sec hold   SL scapular retractions 2x10, 3 sec hold   +SL ER 2x10  +SL abduction 2x10  Seated scapular retraction 2x10, 3 sec hold  +Seated BUE ER 2x10  +Rows to neutral (RTB)    Manual therapy consisting of STM and joint mobilization x 00 minutes  STM to Left upper trap  Gentle oscillations to the L GH joint with all PROM and prone pendulums     Patient received 5 minutes ice to L shoulder propped in abduction.    Written Home Exercises Provided: Updated HEP provided  today; see patient instructions.   Pt demo good understanding of the education provided. Lorraine Fonseca demonstrated good return demonstration of activities.     Assessment:   Lorraine tolerated treatment session well today with no exacerbation of left shoulder pain. Patient demonstrates marked improvements in glenohumeral ROM in all directions indicating compliance with HEP. Patient with firm end feel at end ranges of flexion, external rotation and abduction but improvements noted with passive over pressure. Patient with good tolerance to light rotator cuff and venus-scapular strengthening exercises today with no pain noted. Patient was educated on compliance with HEP to continue improving shoulder ROM as well as ice PRN. Patient verbal understanding. Medical Necessity is demonstrated by:  Fall Risk, Unable to participate fully in daily activities, Requires skilled supervision to complete and progress HEP and Weakness.    Patient is making good progress towards established goals.    Short Term Goals (4 Weeks):   1. Pt will report 25% reduction in L shoulder pain for ease with ADL's  2. Pt will tolerate progression of PROM to protocol and surgeon established goals   3. Pt will tolerate progression of scapular strengthening from manual resistance with good results  Long Term Goals (12 Weeks):   1. Pt will demonstrate full PROM of the L shoulder without compensation   2. Pt will report compliance with post op restrictions as described on post op protocol  3. Pt will tolerated progression to AAROM of the L shoulder for improved independence with daily hygeine and body clothing  LONG term goals (24 weeks)  1. Pt will demonstrate full painfree AROM of the L shoulder for ease with ADLs  2. Pt will demonstrate full functional strength of the L shoulder in all planes for ease with return to work related activities  3. Pt will demonstrate full periscapular strength for ease with shoulder mechanics and household chores  4. Pt  will report being independent with HEP for maintenance of improvements gained during therapy sessions  Plan      Certification Period: 4/25/18 to 12/25/18  Recommended Treatment Plan: 1-2 times per week for 28 weeks: Electrical Stimulation PRN, Iontophoresis (with dexamethasone PRN), Manual Therapy, Moist Heat/ Ice, Neuromuscular Re-ed, Patient Education, Therapeutic Activites, Therapeutic Exercise and Other therapeutic taping, dry needling, aquatic therapy     Other Recommendations: PTA to be involved in pt POC.    Continue with established Plan of Care towards PT goals.     Blanquita Driver, PTA   7/6/2018

## 2018-07-10 ENCOUNTER — CLINICAL SUPPORT (OUTPATIENT)
Dept: REHABILITATION | Facility: HOSPITAL | Age: 53
End: 2018-07-10
Attending: ORTHOPAEDIC SURGERY
Payer: MEDICAID

## 2018-07-10 DIAGNOSIS — M54.2 NECK PAIN: ICD-10-CM

## 2018-07-10 DIAGNOSIS — M62.89 MUSCLE TIGHTNESS: ICD-10-CM

## 2018-07-10 DIAGNOSIS — M25.512 CHRONIC LEFT SHOULDER PAIN: ICD-10-CM

## 2018-07-10 DIAGNOSIS — M25.612 DECREASED ROM OF LEFT SHOULDER: Primary | ICD-10-CM

## 2018-07-10 DIAGNOSIS — G89.29 CHRONIC LEFT SHOULDER PAIN: ICD-10-CM

## 2018-07-10 DIAGNOSIS — R29.3 POSTURE IMBALANCE: ICD-10-CM

## 2018-07-10 PROCEDURE — 97110 THERAPEUTIC EXERCISES: CPT | Mod: PN

## 2018-07-10 PROCEDURE — 97140 MANUAL THERAPY 1/> REGIONS: CPT | Mod: PN

## 2018-07-10 NOTE — PROGRESS NOTES
Name: Lorraine Childers  Clinic Number: [id number]  Date of Treatment: 07/10/2018   Diagnosis:   Encounter Diagnoses   Name Primary?    Decreased ROM of left shoulder Yes    Chronic left shoulder pain     Neck pain     Posture imbalance     Muscle tightness        Progress Note:     Time in: 12:40  Time Out: 1:42  Total Treatment Time:       Visit: 10 of 20, exp 12/31/2018    Precautions:Medium size Rotator cuff protocol & Biceps tenodesis  POW: 11 (surgery on 4/20/2018)    Subjective:    Pt states she is sore today from cutting the grass a couple of days ago. Reports improved compliance with home program. C/o 4/10 discomfort today.     Objective      Shoulder  Left     AROM PROM MMT   flexion 110 150 3+   abduction 60 90 3+   IR at 90 abd NT 38 4-   ER at 90 abd NT 68 4-     Patient received individual therapy to increase strength, endurance, ROM, flexibility, posture and core stabilization with activities as follows:     Lorraine Fonseca received therapeutic exercises to develop strength, endurance, ROM, flexibility, posture and core stabilization for 45 minutes including:     PROM shoulder x 5 min as tolerated (all directions)   Supine ER prop at 80 degres abduction x 3 minutes x 1#   Shoulder flexion AAROM with dowel x 20, 5 sec hold   SL scapular retractions 2x10, 3 sec hold   +SL ER 2x10  +SL abduction 2x10  Seated scapular retraction 2x10, 3 sec hold  +Seated BUE ER 2x10; np  +Rows to neutral (RTB) 3 x 10  -bilateral shoulder extension RTB 3 x 10    Manual therapy consisting of STM and joint mobilization x 10 minutes  STM to Left upper trap: np  Gentle oscillations to the L GH joint with all PROM and prone pendulums     Patient received 5 minutes ice to L shoulder .    Written Home Exercises Provided: Updated HEP provided today; see patient instructions.   Pt demo good understanding of the education provided. Lorraine Fonseca demonstrated good return demonstration of activities.     Assessment:     Pt  requires moderate cueing with scapular positioning with prescribed therex. Improved PROM in all planes. Demonstrates moderate scapular compensation with forward elevation. Pt demonstrates a good understanding of the education provided and a good return demonstration of activities. Pt  Requires skilled supervision to complete and progress home program.    Medical necessity is demonstrated by the following IMPAIRMENTS:  -pain   -decreased range of motion/flexibility    -decreased muscle strength   -impaired function -    -decreased ADL ability  -decreased recreational ability     Patient is making good progress towards established goals.    Short Term Goals (8 Weeks):   MET  7/10/18  1. Pt will report 50% reduction in L shoulder pain for ease with ADL's  2. Pt will tolerate progression of PROM to protocol and surgeon established goals   3. Pt will tolerate progression of scapular strengthening from manual resistance with good results  Long Term Goals (12 Weeks):  In progress  1. Pt will demonstrate full PROM of the L shoulder without compensation   2. Pt will report compliance with post op restrictions as described on post op protocol  3. Pt will tolerated progression to AAROM of the L shoulder for improved independence with daily hygeine and body clothing  LONG term goals (24 weeks)  1. Pt will demonstrate full painfree AROM of the L shoulder for ease with ADLs  2. Pt will demonstrate full functional strength of the L shoulder in all planes for ease with return to work related activities  3. Pt will demonstrate full periscapular strength for ease with shoulder mechanics and household chores  4. Pt will report being independent with HEP for maintenance of improvements gained during therapy sessions    CMS Impairment/Limitation/Restriction for FOTO Shoulder Survey  Status Limitation G-Code CMS Severity Modifier  Intake 13% 87%  Predicted 56% 44% Goal Status+ CK - At least 40 percent but less than 60 percent  7/10/2018  62% 38% Current Status CJ - At least 20 percent but less than 40 percent  Plan      Certification Period: 4/25/18 to 12/25/18    Recommended Treatment Plan: 1-2 times per week for 28 weeks: Electrical Stimulation PRN, Iontophoresis (with dexamethasone PRN), Manual Therapy, Moist Heat/ Ice, Neuromuscular Re-ed, Patient Education, Therapeutic Activites, Therapeutic Exercise and Other therapeutic taping, dry needling, aquatic therapy     Other Recommendations: PTA to be involved in pt POC.    Continue with established Plan of Care towards PT goals.     Ariel Davila, PT   7/10/2018

## 2018-07-11 ENCOUNTER — HOSPITAL ENCOUNTER (EMERGENCY)
Facility: HOSPITAL | Age: 53
Discharge: HOME | End: 2018-07-11
Attending: EMERGENCY MEDICINE
Payer: MEDICAID

## 2018-07-11 VITALS
OXYGEN SATURATION: 95 % | HEART RATE: 70 BPM | WEIGHT: 200 LBS | SYSTOLIC BLOOD PRESSURE: 123 MMHG | BODY MASS INDEX: 31.39 KG/M2 | RESPIRATION RATE: 18 BRPM | DIASTOLIC BLOOD PRESSURE: 72 MMHG | HEIGHT: 67 IN | TEMPERATURE: 99 F

## 2018-07-11 DIAGNOSIS — R10.32 LLQ PAIN: ICD-10-CM

## 2018-07-11 DIAGNOSIS — M54.50 LUMBAR BACK PAIN: Primary | ICD-10-CM

## 2018-07-11 DIAGNOSIS — R11.2 NAUSEA VOMITING AND DIARRHEA: ICD-10-CM

## 2018-07-11 DIAGNOSIS — R19.7 NAUSEA VOMITING AND DIARRHEA: ICD-10-CM

## 2018-07-11 LAB
ALBUMIN SERPL BCP-MCNC: 4.1 G/DL
ALP SERPL-CCNC: 86 U/L
ALT SERPL W/O P-5'-P-CCNC: 29 U/L
ANION GAP SERPL CALC-SCNC: 8 MMOL/L
AST SERPL-CCNC: 18 U/L
BASOPHILS # BLD AUTO: 0.02 K/UL
BASOPHILS NFR BLD: 0.4 %
BILIRUB SERPL-MCNC: 0.5 MG/DL
BILIRUB UR QL STRIP: NEGATIVE
BUN SERPL-MCNC: 16 MG/DL
CALCIUM SERPL-MCNC: 9.6 MG/DL
CHLORIDE SERPL-SCNC: 105 MMOL/L
CLARITY UR: CLEAR
CO2 SERPL-SCNC: 24 MMOL/L
COLOR UR: YELLOW
CREAT SERPL-MCNC: 0.8 MG/DL
DIFFERENTIAL METHOD: ABNORMAL
EOSINOPHIL # BLD AUTO: 0.1 K/UL
EOSINOPHIL NFR BLD: 3 %
ERYTHROCYTE [DISTWIDTH] IN BLOOD BY AUTOMATED COUNT: 14.2 %
EST. GFR  (AFRICAN AMERICAN): >60 ML/MIN/1.73 M^2
EST. GFR  (NON AFRICAN AMERICAN): >60 ML/MIN/1.73 M^2
GLUCOSE SERPL-MCNC: 110 MG/DL
GLUCOSE UR QL STRIP: NEGATIVE
HCT VFR BLD AUTO: 43.7 %
HGB BLD-MCNC: 14.8 G/DL
HGB UR QL STRIP: NEGATIVE
KETONES UR QL STRIP: NEGATIVE
LEUKOCYTE ESTERASE UR QL STRIP: NEGATIVE
LIPASE SERPL-CCNC: 19 U/L
LYMPHOCYTES # BLD AUTO: 0.9 K/UL
LYMPHOCYTES NFR BLD: 18.6 %
MCH RBC QN AUTO: 29.5 PG
MCHC RBC AUTO-ENTMCNC: 33.9 G/DL
MCV RBC AUTO: 87 FL
MONOCYTES # BLD AUTO: 0.4 K/UL
MONOCYTES NFR BLD: 7.5 %
NEUTROPHILS # BLD AUTO: 3.3 K/UL
NEUTROPHILS NFR BLD: 70.3 %
NITRITE UR QL STRIP: NEGATIVE
PH UR STRIP: 5 [PH] (ref 5–8)
PLATELET # BLD AUTO: 203 K/UL
PMV BLD AUTO: 9.4 FL
POCT GLUCOSE: 126 MG/DL (ref 70–110)
POTASSIUM SERPL-SCNC: 4.4 MMOL/L
PROT SERPL-MCNC: 7.5 G/DL
PROT UR QL STRIP: NEGATIVE
RBC # BLD AUTO: 5.02 M/UL
SODIUM SERPL-SCNC: 137 MMOL/L
SP GR UR STRIP: 1.02 (ref 1–1.03)
URN SPEC COLLECT METH UR: NORMAL
UROBILINOGEN UR STRIP-ACNC: NEGATIVE EU/DL
WBC # BLD AUTO: 4.67 K/UL

## 2018-07-11 PROCEDURE — 96367 TX/PROPH/DG ADDL SEQ IV INF: CPT

## 2018-07-11 PROCEDURE — 63600175 PHARM REV CODE 636 W HCPCS: Performed by: PHYSICIAN ASSISTANT

## 2018-07-11 PROCEDURE — 82962 GLUCOSE BLOOD TEST: CPT | Mod: 59

## 2018-07-11 PROCEDURE — 96375 TX/PRO/DX INJ NEW DRUG ADDON: CPT

## 2018-07-11 PROCEDURE — 25000003 PHARM REV CODE 250: Performed by: PHYSICIAN ASSISTANT

## 2018-07-11 PROCEDURE — 96365 THER/PROPH/DIAG IV INF INIT: CPT

## 2018-07-11 PROCEDURE — 25500020 PHARM REV CODE 255: Performed by: EMERGENCY MEDICINE

## 2018-07-11 PROCEDURE — 83690 ASSAY OF LIPASE: CPT

## 2018-07-11 PROCEDURE — 85025 COMPLETE CBC W/AUTO DIFF WBC: CPT

## 2018-07-11 PROCEDURE — 96361 HYDRATE IV INFUSION ADD-ON: CPT

## 2018-07-11 PROCEDURE — 80053 COMPREHEN METABOLIC PANEL: CPT

## 2018-07-11 PROCEDURE — 99284 EMERGENCY DEPT VISIT MOD MDM: CPT | Mod: 25

## 2018-07-11 PROCEDURE — 96366 THER/PROPH/DIAG IV INF ADDON: CPT

## 2018-07-11 PROCEDURE — 81003 URINALYSIS AUTO W/O SCOPE: CPT

## 2018-07-11 RX ORDER — PROMETHAZINE HYDROCHLORIDE 25 MG/1
25 TABLET ORAL EVERY 6 HOURS PRN
Qty: 15 TABLET | Refills: 0 | Status: SHIPPED | OUTPATIENT
Start: 2018-07-11 | End: 2018-07-16

## 2018-07-11 RX ORDER — KETOROLAC TROMETHAMINE 30 MG/ML
15 INJECTION, SOLUTION INTRAMUSCULAR; INTRAVENOUS
Status: COMPLETED | OUTPATIENT
Start: 2018-07-11 | End: 2018-07-11

## 2018-07-11 RX ORDER — LIDOCAINE 50 MG/G
1 PATCH TOPICAL DAILY
Qty: 15 PATCH | Refills: 0 | Status: SHIPPED | OUTPATIENT
Start: 2018-07-11 | End: 2018-07-26

## 2018-07-11 RX ORDER — MORPHINE SULFATE 4 MG/ML
4 INJECTION, SOLUTION INTRAMUSCULAR; INTRAVENOUS
Status: COMPLETED | OUTPATIENT
Start: 2018-07-11 | End: 2018-07-11

## 2018-07-11 RX ORDER — OXYCODONE AND ACETAMINOPHEN 5; 325 MG/1; MG/1
1 TABLET ORAL EVERY 4 HOURS PRN
Qty: 15 TABLET | Refills: 0 | Status: SHIPPED | OUTPATIENT
Start: 2018-07-11 | End: 2018-07-16

## 2018-07-11 RX ORDER — ONDANSETRON 2 MG/ML
4 INJECTION INTRAMUSCULAR; INTRAVENOUS
Status: COMPLETED | OUTPATIENT
Start: 2018-07-11 | End: 2018-07-11

## 2018-07-11 RX ADMIN — MORPHINE SULFATE 4 MG: 4 INJECTION INTRAVENOUS at 02:07

## 2018-07-11 RX ADMIN — PROMETHAZINE HYDROCHLORIDE 12.5 MG: 25 INJECTION INTRAMUSCULAR; INTRAVENOUS at 02:07

## 2018-07-11 RX ADMIN — KETOROLAC TROMETHAMINE 15 MG: 30 INJECTION, SOLUTION INTRAMUSCULAR at 12:07

## 2018-07-11 RX ADMIN — IOHEXOL 100 ML: 350 INJECTION, SOLUTION INTRAVENOUS at 01:07

## 2018-07-11 RX ADMIN — ONDANSETRON 4 MG: 2 INJECTION INTRAMUSCULAR; INTRAVENOUS at 12:07

## 2018-07-11 RX ADMIN — PROMETHAZINE HYDROCHLORIDE 12.5 MG: 25 INJECTION INTRAMUSCULAR; INTRAVENOUS at 01:07

## 2018-07-11 RX ADMIN — SODIUM CHLORIDE 1000 ML: 0.9 INJECTION, SOLUTION INTRAVENOUS at 12:07

## 2018-07-11 NOTE — ED PROVIDER NOTES
"Encounter Date: 2018  52 y.o. female with left lower back pain, abdominal pain, nausea vomiting.  Patient will be seen by another provider for further evaluation when an exam room is available. Aditya BRADEN, 11:47 AM       History     Chief Complaint   Patient presents with    Abdominal Pain     Left lower back pain radiaitng to hip.  Abdominal pain with vomiting and diarrhea.  C/o intermittent right chest burning radiating to back.     CC: Abdominal Pain    HPI:  51 y/o female with history of chronic back pain, history of c-sections x2 presents for evaluation of L lumbar back pain radiating to LLQ and occasionally down LLE. Pt reports constant pain since 630 with associated vomiting x 4 and diarrhea x4 since 830. Denies similar episodes previously. Pt reports currently nauseous and feeling lightheaded.   Pt does report she has been to ER for R sided stabbing and burning chest pain 3 times within the past few months with full workup and no findings to explain her pain. Pt has also had endoscopy by Has been to ER for R sided stabbing and burning chest pain 3 times in past few months. Attempted tx with Tylenol. She denies fever, CP currently or in the past 24 hours, SOB, dizziness.              Review of patient's allergies indicates:   Allergen Reactions    Percocet [oxycodone-acetaminophen] Nausea Only     Can take Percocet if she takes Phenergan or Zofran.    Vicodin [hydrocodone-acetaminophen]      "Makes me Hyper." Doesn't help the pain.     Past Medical History:   Diagnosis Date    Anxiety     Back problem     Diabetes mellitus type I     no meds since weight loss few years back    General anesthetics causing adverse effect in therapeutic use     sometimes hard to sedate--    Pneumonia     Thyroid disease     thyroid nodules     Past Surgical History:   Procedure Laterality Date     SECTION  x2     and     HYSTERECTOMY      Partial abd    RHINOPLASTY TIP      SHOULDER SURGERY "       Family History   Problem Relation Age of Onset    Diabetes Mother     Heart disease Mother     Hyperlipidemia Mother     Hypertension Mother      Social History   Substance Use Topics    Smoking status: Current Every Day Smoker     Packs/day: 1.00     Types: Cigarettes    Smokeless tobacco: Never Used    Alcohol use No     Review of Systems   Constitutional: Positive for chills. Negative for appetite change and fever.   Eyes: Negative for redness.   Gastrointestinal: Positive for abdominal pain, diarrhea, nausea and vomiting.   Genitourinary: Negative for dysuria, frequency, hematuria and urgency.   Musculoskeletal: Positive for back pain.   Skin: Negative for rash.   Neurological: Positive for light-headedness. Negative for dizziness.       Physical Exam     Initial Vitals [07/11/18 1146]   BP Pulse Resp Temp SpO2   (!) 146/87 81 18 98 °F (36.7 °C) 98 %      MAP       --         Physical Exam    Nursing note and vitals reviewed.  Constitutional: She appears well-developed and well-nourished.   Appears uncomfortable due to pain   HENT:   Head: Normocephalic.   Right Ear: External ear normal.   Left Ear: External ear normal.   Nose: Nose normal.   Mouth/Throat: Oropharynx is clear and moist.   Eyes: Conjunctivae are normal.   Cardiovascular: Normal rate and regular rhythm. Exam reveals no gallop and no friction rub.    No murmur heard.  Pulmonary/Chest: Breath sounds normal. She has no wheezes. She has no rhonchi. She has no rales.   Abdominal: Soft. Bowel sounds are normal. She exhibits no distension. There is tenderness in the epigastric area and left lower quadrant. There is no rigidity, no rebound, no guarding, no CVA tenderness, no tenderness at McBurney's point and negative Jacob's sign.   Left flank TTP    Musculoskeletal: Normal range of motion.   TTP to L lumbar paraspinal musculature      Lymphadenopathy:     She has no cervical adenopathy.   Neurological: She is alert. She has normal  strength. No sensory deficit.   Skin: Skin is warm and dry.   Psychiatric: She has a normal mood and affect.         ED Course   Procedures  Labs Reviewed   CBC W/ AUTO DIFFERENTIAL - Abnormal; Notable for the following:        Result Value    Lymph # 0.9 (*)     All other components within normal limits   POCT GLUCOSE - Abnormal; Notable for the following:     POCT Glucose 126 (*)     All other components within normal limits   URINALYSIS, REFLEX TO URINE CULTURE    Narrative:     Preferred Collection Type->Urine, Clean Catch   COMPREHENSIVE METABOLIC PANEL   LIPASE          Imaging Results          CT Abdomen Pelvis With Contrast (Final result)  Result time 07/11/18 13:57:39    Final result by Luciano Wang MD (07/11/18 13:57:39)                 Impression:      1. Hepatomegaly with suspected steatosis, correlation with LFTs advised.  Stable indeterminate hepatic lesion, described above.  2. No discrete CT abnormality to correlate with reported history of left lower quadrant pain.  3. Probable atelectasis involving the left lower lobe, pulmonary nodule felt less likely, given its size, 1 year follow-up is optional.  4. Several additional findings above.      Electronically signed by: Luciano Wang MD  Date:    07/11/2018  Time:    13:57             Narrative:    EXAMINATION:  CT ABDOMEN PELVIS WITH CONTRAST    CLINICAL HISTORY:  LLQ pain, NVD;    TECHNIQUE:  Low dose axial images, sagittal and coronal reformations were obtained from the lung bases to the pubic symphysis following the IV administration of 100 mL of Omnipaque 350 .  Oral contrast was not given.    COMPARISON:  07/26/2017    FINDINGS:  Images of the lower thorax are remarkable for dependent atelectasis.  There is a possible pulmonary nodule versus atelectasis within the left lower lobe, atelectasis favored, noting this measures approximately 2 mm.    The liver is diffusely hypoattenuating and somewhat heterogeneous in appearance, findings  suggests steatosis although correlation with LFTs is recommended.  The liver is enlarged.  There is a vague arterial enhancing focus within the left hepatic lobe laterally measuring 0.8 cm, unchanged since the previous exam and remains indeterminate.  The spleen is remarkable for a vague focus of hypoattenuation within the midportion measuring approximately 1 cm, unchanged since the previous exam and nonspecific.  The pancreas, gallbladder and adrenal glands are grossly unremarkable.  The portal vein, splenic vein, proximal SMV, celiac axis and SMA all are patent.  Scattered shotty periaortic and paracaval lymph nodes are noted.    The kidneys enhance symmetrically and excrete contrast appropriately without hydronephrosis or nephrolithiasis.  The bilateral ureters are unremarkable without calculi seen noting the distal ureters are difficult to follow in their entirety to the urinary bladder.  No secondary signs of obstructive uropathy.  The urinary bladder is grossly unremarkable.    The large bowel is unremarkable as is the appendix.  The small bowel, including the terminal ileum are unremarkable.  No focal organized pelvic fluid collection.  No significant free fluid in the pelvis.  The uterus is absent the adnexa is unremarkable.    Degenerative changes are noted of the lumbar spine without focal osseous destructive process.  No significant inguinal lymphadenopathy.                                 Medical Decision Making:   Initial Assessment:   52-year-old female with history of chronic back pain, anxiety presents for evaluation stating her radiating to L flank and LLQ that began this morning with associated nausea, vomiting and diarrhea.  Patient denies fevers. History of 2 c-sections  She reports she has been having BM and passing flatus. Pt is afebrile in no distress. Exam as above. Labs unremarkable. UA negative for infection or hematuria. CT ordered tdue to severity of pain and  tenderness and negative for  diverticulitis, bowel obstruction or findings to explain patient's left lower quadrant pain. Discussed findings of hepatomegaly with suspected steatosis. LFTs normal. Denies alcohol abuse. Pt did have mild epigastric TTP. No pain without palpation. Lipase within normal limits. Pt states she has missed 2 outpatient ultrasound for evaluation of her gallbladder due to chest pains and epigastric pain. Doubt acute cholecystitis in absence of pain to RUQ pain or epigastric region without palpation. Will have pt follow up with GI for hepatomegaly and outpatient gallbladder ultrasound. Will also have follow up with ORthopedics or spine surgery. Pain and nausea improved with Toradol, morphine, Zofran and Phenergan.  Patient discharged home in stable condition Percocet and Phenergan and Lidoderm for symptomatic tx. PCP follow up in 2 days.  ER return precautions discussed including worsening symptoms or as needed. Discussed pt with Dr. Vital who agrees with assessment and plan.                       Clinical Impression:   The primary encounter diagnosis was Lumbar back pain. Diagnoses of LLQ pain and Nausea vomiting and diarrhea were also pertinent to this visit.                             Aileen Pantoja PA-C  07/11/18 1757       Aileen Pantoja PA-C  07/11/18 175

## 2018-07-11 NOTE — ED TRIAGE NOTES
Pt reports waking up this morning with stabbing pain in lower back migrating to hip. Pt has thrown up multiple times this morning and had diarrhea. Pt tried laying down with no relief.

## 2018-07-12 ENCOUNTER — TELEPHONE (OUTPATIENT)
Dept: FAMILY MEDICINE | Facility: CLINIC | Age: 53
End: 2018-07-12

## 2018-07-12 NOTE — TELEPHONE ENCOUNTER
----- Message from Estela Asher sent at 7/12/2018 10:09 AM CDT -----  Contact: Self/ [phone]  Pt requesting same day follow up appt today. Was in ER for vomiting and pains in her back. Says CT shows enlarged liver and also she is still vomiting with severe back pain. Please call to advise. Thank you.

## 2018-07-12 NOTE — TELEPHONE ENCOUNTER
I reviewed the record and all the test.   ED note says follow up 2 days. We can override her for tomorrow.   The enlarged liver is not the source of the abd pain. She has a fatty liver. They advised follow up with GI. GI may look in your stomach...  I can see her tomorrow.

## 2018-07-13 ENCOUNTER — OFFICE VISIT (OUTPATIENT)
Dept: FAMILY MEDICINE | Facility: CLINIC | Age: 53
End: 2018-07-13
Payer: MEDICAID

## 2018-07-13 VITALS
HEART RATE: 83 BPM | TEMPERATURE: 98 F | OXYGEN SATURATION: 96 % | WEIGHT: 216.5 LBS | BODY MASS INDEX: 33.98 KG/M2 | DIASTOLIC BLOOD PRESSURE: 78 MMHG | SYSTOLIC BLOOD PRESSURE: 130 MMHG | HEIGHT: 67 IN

## 2018-07-13 DIAGNOSIS — M54.42 LEFT-SIDED LOW BACK PAIN WITH LEFT-SIDED SCIATICA, UNSPECIFIED CHRONICITY: ICD-10-CM

## 2018-07-13 DIAGNOSIS — R10.84 ABDOMINAL PAIN, GENERALIZED: ICD-10-CM

## 2018-07-13 DIAGNOSIS — R10.9 LEFT FLANK PAIN: Primary | ICD-10-CM

## 2018-07-13 PROCEDURE — 99213 OFFICE O/P EST LOW 20 MIN: CPT | Mod: PBBFAC,PO | Performed by: NURSE PRACTITIONER

## 2018-07-13 PROCEDURE — 99214 OFFICE O/P EST MOD 30 MIN: CPT | Mod: S$PBB,,, | Performed by: NURSE PRACTITIONER

## 2018-07-13 PROCEDURE — 99999 PR PBB SHADOW E&M-EST. PATIENT-LVL III: CPT | Mod: PBBFAC,,, | Performed by: NURSE PRACTITIONER

## 2018-07-13 RX ORDER — ATORVASTATIN CALCIUM 40 MG/1
40 TABLET, FILM COATED ORAL DAILY
Qty: 90 TABLET | Refills: 3 | Status: ON HOLD | OUTPATIENT
Start: 2018-07-13 | End: 2018-10-29 | Stop reason: CLARIF

## 2018-07-13 RX ORDER — BACLOFEN 20 MG/1
20 TABLET ORAL 3 TIMES DAILY
Qty: 90 TABLET | Refills: 0 | Status: SHIPPED | OUTPATIENT
Start: 2018-07-13 | End: 2018-10-17

## 2018-07-13 NOTE — PROGRESS NOTES
This dictation has been generated using Dragon Dictation some phonetic errors may occur.     Problem List Items Addressed This Visit     None      Visit Diagnoses     Left flank pain    -  Primary    Left-sided low back pain with left-sided sciatica, unspecified chronicity        Abdominal pain, generalized        follow up with Abby for exam.         Orders Placed This Encounter    baclofen (LIORESAL) 20 MG tablet    atorvastatin (LIPITOR) 40 MG tablet     Left flank pain and low back pain with sciatica.  Due primarily to muscle spasms.  Recommended baclofen as above.  Has not been taking atorvastatin as directed.  Requests refill of medicine today and states intent to start med.  Abdominal and low back pain due for GYN exam.  Follows with Dr. Mora.  Needs to call and make appointment.     Follow-up if symptoms worsen or fail to improve.    ________________________________________________________________  ________________________________________________________________      Chief Complaint   Patient presents with    Results    Back Pain     History of present illness  This 52 y.o. presents today for complaint of left-sided flank abdominal and back pain.  Does have some left-sided radiculopathy down into the upper thigh.  Into the emergency room recently.  I reviewed the record.  Surprisingly CT did not show a renal stone.  Patient did have pain with nausea and vomiting.  She received injections.  Other testing was unremarkable.  She is overdue for follow-up with GYN.  Patient denies a rash.  Complete review of systems otherwise negative.      Past Medical History:   Diagnosis Date    Anxiety     Back problem     Diabetes mellitus type I     no meds since weight loss few years back    General anesthetics causing adverse effect in therapeutic use     sometimes hard to sedate--    Pneumonia     Thyroid disease     thyroid nodules       Past Surgical History:   Procedure Laterality Date      SECTION  x2    1991 and 2007    HYSTERECTOMY      Partial abd    RHINOPLASTY TIP      SHOULDER SURGERY         Family History   Problem Relation Age of Onset    Diabetes Mother     Heart disease Mother     Hyperlipidemia Mother     Hypertension Mother        Social History     Social History    Marital status: Single     Spouse name: N/A    Number of children: N/A    Years of education: N/A     Social History Main Topics    Smoking status: Current Every Day Smoker     Packs/day: 1.00     Types: Cigarettes    Smokeless tobacco: Never Used    Alcohol use No    Drug use: No    Sexual activity: Not Asked     Other Topics Concern    None     Social History Narrative    None       Current Outpatient Prescriptions   Medication Sig Dispense Refill    atorvastatin (LIPITOR) 40 MG tablet Take 1 tablet (40 mg total) by mouth once daily. 90 tablet 3    celecoxib (CELEBREX) 200 MG capsule Take 1 capsule (200 mg total) by mouth 2 (two) times daily. 60 capsule 1    esomeprazole (NEXIUM) 40 MG capsule Take 1 capsule (40 mg total) by mouth before breakfast. 30 capsule 3    ibuprofen (ADVIL,MOTRIN) 800 MG tablet Take 1 tablet (800 mg total) by mouth 3 (three) times daily as needed for Pain. 90 tablet 0    lidocaine (LIDODERM) 5 % Place 1 patch onto the skin once daily. Remove & Discard patch within 12 hours to left low back for 15 days 15 patch 0    LORazepam (ATIVAN) 0.5 MG tablet 1 TABLET BY MOUTH EVERY 6 HOURS AS NEEDED 90 tablet 3    oxyCODONE-acetaminophen (PERCOCET) 5-325 mg per tablet Take 1 tablet by mouth every 4 (four) hours as needed for Pain. 15 tablet 0    promethazine (PHENERGAN) 25 MG tablet Take 1 tablet (25 mg total) by mouth every 6 (six) hours as needed for Nausea. 15 tablet 0    baclofen (LIORESAL) 20 MG tablet Take 1 tablet (20 mg total) by mouth 3 (three) times daily. 90 tablet 0     No current facility-administered medications for this visit.        Review of patient's allergies  "indicates:   Allergen Reactions    Percocet [oxycodone-acetaminophen] Nausea Only     Can take Percocet if she takes Phenergan or Zofran.    Vicodin [hydrocodone-acetaminophen]      "Makes me Hyper." Doesn't help the pain.    Zofran [ondansetron hcl] Nausea And Vomiting       Physical examination  Vitals Reviewed  Gen. Well-dressed well-nourished    Skin warm dry and intact.  No rashes noted.  HEENT.  TM intact bilateral with normal light reflex.  No mastoid tenderness during percussion.  Nares patent bilateral.  Pharynx is unremarkable.  No maxillary or frontal sinus tenderness when percussed.    Neck is supple without adenopathy  Chest.  Respirations are even unlabored.  Lungs are clear to auscultation.  Cardiac regular rate and rhythm.  No chest wall adenopathy noted.  Abdomen is soft and not distended.  Bowel sounds are present.  No tenderness during palpation of the abdomen.  No Hepatosplenomegaly noted.  No hernia noted.  No CVA tenderness to percussion.  LUQ tenderness to deep palpation.   Neuro. Awake alert oriented x4.  Normal judgment and cognition noted.  Extremities no clubbing cyanosis or edema noted.  Fairly normal spinal alignment minimal heel discrepancy.  Mild tenderness at L4-L5.  She does have palpable spasms of the paraspinal muscles left lumbar spine.  No rash noted.    Call or return to clinic prn if these symptoms worsen or fail to improve as anticipated.    "

## 2018-07-19 ENCOUNTER — TELEPHONE (OUTPATIENT)
Dept: SPORTS MEDICINE | Facility: CLINIC | Age: 53
End: 2018-07-19

## 2018-07-19 ENCOUNTER — CLINICAL SUPPORT (OUTPATIENT)
Dept: REHABILITATION | Facility: HOSPITAL | Age: 53
End: 2018-07-19
Attending: ORTHOPAEDIC SURGERY
Payer: MEDICAID

## 2018-07-19 DIAGNOSIS — R29.3 POSTURE IMBALANCE: ICD-10-CM

## 2018-07-19 DIAGNOSIS — G89.29 CHRONIC LEFT SHOULDER PAIN: ICD-10-CM

## 2018-07-19 DIAGNOSIS — M54.2 NECK PAIN: ICD-10-CM

## 2018-07-19 DIAGNOSIS — M25.512 CHRONIC LEFT SHOULDER PAIN: ICD-10-CM

## 2018-07-19 DIAGNOSIS — M62.89 MUSCLE TIGHTNESS: ICD-10-CM

## 2018-07-19 DIAGNOSIS — M25.612 DECREASED ROM OF LEFT SHOULDER: Primary | ICD-10-CM

## 2018-07-19 PROCEDURE — 97110 THERAPEUTIC EXERCISES: CPT | Mod: PN

## 2018-07-19 NOTE — PROGRESS NOTES
Name: Lorraine Childers  Clinic Number: [id number]  Date of Treatment: 07/19/2018   Diagnosis:   Encounter Diagnoses   Name Primary?    Decreased ROM of left shoulder Yes    Chronic left shoulder pain     Neck pain     Posture imbalance     Muscle tightness        Progress Note:     Time in: 1210  Time Out: 1300  Total Treatment Time: 50 minutes (1:1 with PTA for 30 minutes)    Visit: 11 of 20, exp 12/31/2018    Precautions:Medium size Rotator cuff protocol & Biceps tenodesis  POW: 12 (surgery on 4/20/2018)    Subjective:    Patient reports that she feels sore more than anything. Patient reports compliance with her HEP.   Pain Level: 0 out of 10, currently.    Objective    Patient received individual therapy to increase strength, endurance, ROM, flexibility, posture and core stabilization with activities as follows:     Lorraine Fonseca received therapeutic exercises to develop strength, endurance, ROM, flexibility, posture and core stabilization for 40 minutes including:     UBE: 6 minutes  PROM shoulder x 5 min as tolerated (all directions)   Supine ER prop at 80 degres abduction x 3 minutes x 1#   Shoulder flexion AAROM with dowel x 20, 5 sec hold   SL scapular retractions 2x10, 3 sec hold   SL ER 2x10  SL abduction 2x10  Seated scapular retraction 2x10, 3 sec hold  Seated BUE ER 2x10; np  Rows to neutral (RTB) 3 x 10  Bilateral shoulder extension RTB 3 x 10    Manual therapy consisting of STM and joint mobilization x 10 minutes  STM to Left upper trap - not today  Gentle oscillations to the L GH joint with all PROM and prone pendulums     Written Home Exercises Provided: Updated HEP provided today; see patient instructions.   Pt demo good understanding of the education provided. Lorraine Fonseca demonstrated good return demonstration of activities.     Assessment:     Patient tolerated treatment session well today. Goo tolerance to exercises performed with no exacerbation of shoulder pain. Patient  demonstrating improvement in PROM. Pt demonstrates a good understanding of the education provided and a good return demonstration of activities. Pt  Requires skilled supervision to complete and progress home program.    Medical necessity is demonstrated by the following IMPAIRMENTS:  -pain   -decreased range of motion/flexibility    -decreased muscle strength   -impaired function -    -decreased ADL ability  -decreased recreational ability     Patient is making good progress towards established goals.    Short Term Goals (8 Weeks):   MET  7/10/18  1. Pt will report 50% reduction in L shoulder pain for ease with ADL's  2. Pt will tolerate progression of PROM to protocol and surgeon established goals   3. Pt will tolerate progression of scapular strengthening from manual resistance with good results  Long Term Goals (12 Weeks):  In progress  1. Pt will demonstrate full PROM of the L shoulder without compensation   2. Pt will report compliance with post op restrictions as described on post op protocol  3. Pt will tolerated progression to AAROM of the L shoulder for improved independence with daily hygeine and body clothing  LONG term goals (24 weeks)  1. Pt will demonstrate full painfree AROM of the L shoulder for ease with ADLs  2. Pt will demonstrate full functional strength of the L shoulder in all planes for ease with return to work related activities  3. Pt will demonstrate full periscapular strength for ease with shoulder mechanics and household chores  4. Pt will report being independent with HEP for maintenance of improvements gained during therapy sessions    CMS Impairment/Limitation/Restriction for FOTO Shoulder Survey  Status Limitation G-Code CMS Severity Modifier  Intake 13% 87%  Predicted 56% 44% Goal Status+ CK - At least 40 percent but less than 60 percent  7/10/2018 62% 38% Current Status CJ - At least 20 percent but less than 40 percent  Plan      Certification Period: 4/25/18 to  12/25/18    Recommended Treatment Plan: 1-2 times per week for 28 weeks: Electrical Stimulation PRN, Iontophoresis (with dexamethasone PRN), Manual Therapy, Moist Heat/ Ice, Neuromuscular Re-ed, Patient Education, Therapeutic Activites, Therapeutic Exercise and Other therapeutic taping, dry needling, aquatic therapy     Other Recommendations: PTA to be involved in pt POC.    Continue with established Plan of Care towards PT goals.     Bety Castle, PTA   7/19/2018

## 2018-07-24 ENCOUNTER — CLINICAL SUPPORT (OUTPATIENT)
Dept: REHABILITATION | Facility: HOSPITAL | Age: 53
End: 2018-07-24
Attending: ORTHOPAEDIC SURGERY
Payer: MEDICAID

## 2018-07-24 DIAGNOSIS — M25.512 CHRONIC LEFT SHOULDER PAIN: ICD-10-CM

## 2018-07-24 DIAGNOSIS — M75.22 BICEPS TENDONITIS ON LEFT: ICD-10-CM

## 2018-07-24 DIAGNOSIS — M25.612 DECREASED ROM OF LEFT SHOULDER: ICD-10-CM

## 2018-07-24 DIAGNOSIS — M62.89 MUSCLE TIGHTNESS: ICD-10-CM

## 2018-07-24 DIAGNOSIS — G89.29 CHRONIC LEFT SHOULDER PAIN: ICD-10-CM

## 2018-07-24 DIAGNOSIS — M75.122 COMPLETE TEAR OF LEFT ROTATOR CUFF: ICD-10-CM

## 2018-07-24 PROCEDURE — 97110 THERAPEUTIC EXERCISES: CPT | Mod: PN

## 2018-07-24 NOTE — PROGRESS NOTES
Name: Lorraine Childers  Clinic Number: [id number]  Date of Treatment: 07/24/2018   Diagnosis:   Encounter Diagnoses   Name Primary?    Biceps tendonitis on left     Chronic left shoulder pain     Complete tear of left rotator cuff     Decreased ROM of left shoulder     Muscle tightness        Progress Note:     Time in: 1210  Time Out: 1300  Total Treatment Time: 50 minutes (1:1 with PT for 30 minutes)    Visit: 12 of 20, exp 12/31/2018    Precautions:Medium size Rotator cuff protocol & Biceps tenodesis  POW: 12 (surgery on 4/20/2018)    Subjective:    Patient reports that she has done more.     Pain Level: 0 out of 10, currently.    Objective    Patient received individual therapy to increase strength, endurance, ROM, flexibility, posture and core stabilization with activities as follows:     Lorraine Fonseca received therapeutic exercises to develop strength, endurance, ROM, flexibility, posture and core stabilization for 40 minutes including:     UBE: 6 minutes  PROM shoulder x 5 min as tolerated (all directions)   Supine ER prop at 80 degres abduction x 3 minutes x 1#   Shoulder flexion AAROM with dowel x 20, 5 sec hold   SL scapular retractions 2x10, 3 sec hold    SL ER 2x10  SL abduction 2x10 (pain with popping)   Seated scapular retraction 2x10, 3 sec hold  Seated BUE ER 2x10; np  Rows to neutral (RTB) 3 x 10  Bilateral shoulder extension RTB 3 x 10    Manual therapy consisting of STM and joint mobilization x 10 minutes  STM to Left upper trap - not today  Gentle oscillations to the L GH joint with all PROM and prone pendulums     Written Home Exercises Provided: Updated HEP provided today; see patient instructions.   Pt demo good understanding of the education provided. Lorraine Fonseca demonstrated good return demonstration of activities.     Assessment:     Patient tolerated treatment session well today. Good tolerance to exercises but had slight discomfort with SL shoulder abduction and had presence of  crepitus with extreme motion into 70-90 degrees. Patient was able to tolerate exercises better when kept in a pain free range less than 50%.  Patient demonstrating improvement in PROM. Pt demonstrates a good understanding of the education provided and a good return demonstration of activities. Pt  Requires skilled supervision to complete and progress home program.    Medical necessity is demonstrated by the following IMPAIRMENTS:  -pain   -decreased range of motion/flexibility    -decreased muscle strength   -impaired function -    -decreased ADL ability  -decreased recreational ability     Patient is making good progress towards established goals.    Short Term Goals (8 Weeks):   MET  7/10/18  1. Pt will report 50% reduction in L shoulder pain for ease with ADL's  2. Pt will tolerate progression of PROM to protocol and surgeon established goals   3. Pt will tolerate progression of scapular strengthening from manual resistance with good results  Long Term Goals (12 Weeks):  In progress  1. Pt will demonstrate full PROM of the L shoulder without compensation   2. Pt will report compliance with post op restrictions as described on post op protocol  3. Pt will tolerated progression to AAROM of the L shoulder for improved independence with daily hygeine and body clothing  LONG term goals (24 weeks)  1. Pt will demonstrate full painfree AROM of the L shoulder for ease with ADLs  2. Pt will demonstrate full functional strength of the L shoulder in all planes for ease with return to work related activities  3. Pt will demonstrate full periscapular strength for ease with shoulder mechanics and household chores  4. Pt will report being independent with HEP for maintenance of improvements gained during therapy sessions    CMS Impairment/Limitation/Restriction for FOTO Shoulder Survey  Status Limitation G-Code CMS Severity Modifier  Intake 13% 87%  Predicted 56% 44% Goal Status+ CK - At least 40 percent but less than 60  percent  7/10/2018 62% 38% Current Status CJ - At least 20 percent but less than 40 percent  Plan      Certification Period: 4/25/18 to 12/25/18    Recommended Treatment Plan: 1-2 times per week for 28 weeks: Electrical Stimulation PRN, Iontophoresis (with dexamethasone PRN), Manual Therapy, Moist Heat/ Ice, Neuromuscular Re-ed, Patient Education, Therapeutic Activites, Therapeutic Exercise and Other therapeutic taping, dry needling, aquatic therapy     Other Recommendations: PTA to be involved in pt POC.    Continue with established Plan of Care towards PT goals.     Prieto Love, PT   7/24/2018

## 2018-07-27 ENCOUNTER — OFFICE VISIT (OUTPATIENT)
Dept: SPORTS MEDICINE | Facility: CLINIC | Age: 53
End: 2018-07-27
Payer: MEDICAID

## 2018-07-27 ENCOUNTER — HOSPITAL ENCOUNTER (OUTPATIENT)
Dept: RADIOLOGY | Facility: HOSPITAL | Age: 53
Discharge: HOME | End: 2018-07-27
Attending: PHYSICIAN ASSISTANT
Payer: MEDICAID

## 2018-07-27 VITALS
BODY MASS INDEX: 33.98 KG/M2 | HEART RATE: 87 BPM | WEIGHT: 216.5 LBS | SYSTOLIC BLOOD PRESSURE: 127 MMHG | DIASTOLIC BLOOD PRESSURE: 75 MMHG | HEIGHT: 67 IN

## 2018-07-27 DIAGNOSIS — M25.512 LEFT SHOULDER PAIN, UNSPECIFIED CHRONICITY: ICD-10-CM

## 2018-07-27 DIAGNOSIS — M25.512 LEFT SHOULDER PAIN, UNSPECIFIED CHRONICITY: Primary | ICD-10-CM

## 2018-07-27 DIAGNOSIS — Z98.890 S/P ARTHROSCOPY OF LEFT SHOULDER: ICD-10-CM

## 2018-07-27 PROCEDURE — 99214 OFFICE O/P EST MOD 30 MIN: CPT | Mod: PBBFAC,25,PO | Performed by: PHYSICIAN ASSISTANT

## 2018-07-27 PROCEDURE — 99999 PR PBB SHADOW E&M-EST. PATIENT-LVL IV: CPT | Mod: PBBFAC,,, | Performed by: PHYSICIAN ASSISTANT

## 2018-07-27 PROCEDURE — 73030 X-RAY EXAM OF SHOULDER: CPT | Mod: 26,LT,, | Performed by: RADIOLOGY

## 2018-07-27 PROCEDURE — 99024 POSTOP FOLLOW-UP VISIT: CPT | Mod: ,,, | Performed by: PHYSICIAN ASSISTANT

## 2018-07-27 PROCEDURE — 73030 X-RAY EXAM OF SHOULDER: CPT | Mod: TC,FY,PO,LT

## 2018-07-27 RX ORDER — PROMETHAZINE HYDROCHLORIDE 25 MG/1
25 TABLET ORAL EVERY 6 HOURS PRN
Qty: 30 TABLET | Refills: 0 | Status: SHIPPED | OUTPATIENT
Start: 2018-07-27 | End: 2018-08-26

## 2018-07-27 NOTE — PROGRESS NOTES
Subjective:          Chief Complaint: Lorraine Childers is a 52 y.o. female who had concerns including Post-op Evaluation of the Left Shoulder.    Patient is here for 3 month post op left shoulder. She is doing PT at [facility] and doing well. Pain today is 2/10. She has been taking 1/2 percocet 10mg before bed time as needed for pain and icing her shoulder. She has also been taking Celebrex 200mg BID.    DATE OF PROCEDURE: 4/20/2018     ATTENDING SURGEON: Surgeon(s) and Role:     * Susanna Vann MD - Primary  Assistants:  Juan Luis Smith MD-Fellow  Lisette Akbar PA-C-Assistant     PREOPERATIVE DIAGNOSIS:  Left shoulder   Tendinitis, Biceps M75.20 A-C Arthritis M12.9, Rotator Cuff Syndrome/Disorder  M75.100 and Tear, Rotator Cuff, Non-traumatic M75.100     POSTOPERATIVE DIAGNOSIS: Left shoulder   Tendinitis, Biceps M75.20 A-C Arthritis M12.9, Rotator Cuff Syndrome/Disorder  M75.100, Tear, Rotator Cuff, Non-traumatic M75.100 and Tendinitis, Biceps M75.20            PROCEDURES PERFORMED:  1. Left shoulder Arthroscopic rotator cuff repair CPT - 16295     2. Left shoulder Biceps tenodesis CPT - 76518     3. Left shoulder Arthroscopic distal clavicle excision CPT - 20379     4. Left shoulder Amniox Arthrocentesis, 41670      Handedness: right-handed  Sport played:    Level:            Pain   This is a chronic problem. The current episode started more than 1 month ago. The problem occurs intermittently. The problem has been rapidly worsening. Associated symptoms include joint swelling. Pertinent negatives include no chest pain, fever, numbness or rash. The symptoms are aggravated by exertion. The treatment provided no relief.       Review of Systems   Constitution: Negative for fever and night sweats.   HENT: Negative for hearing loss.    Eyes: Negative for blurred vision and visual disturbance.   Cardiovascular: Negative for chest pain and leg swelling.   Respiratory: Negative for shortness of breath.     Endocrine: Negative for polyuria.   Hematologic/Lymphatic: Negative for bleeding problem.   Skin: Negative for rash.   Musculoskeletal: Positive for joint pain and joint swelling. Negative for back pain, muscle cramps and muscle weakness.   Gastrointestinal: Negative for melena.   Genitourinary: Negative for hematuria.   Neurological: Negative for loss of balance, numbness and paresthesias.   Psychiatric/Behavioral: Negative for altered mental status.       Pain Related Questions  Over the past 3 days, what was your average pain during activity? (I.e. running, jogging, walking, climbing stairs, getting dressed, ect.): 2  Over the past 3 days, what was your highest pain level?: 2  Over the past 3 days, what was your lowest pain level? : 1    Other  How many nights a week are you awakened by your affected body part?: 0  Was the patient's HEIGHT measured or patient reported?: Patient Reported  Was the patient's WEIGHT measured or patient reported?: Measured      Objective:        General: Lorraine Fonseca is well-developed, well-nourished, appears stated age, in no acute distress, alert and oriented to time, place and person.     General    Vitals reviewed.  Constitutional: She is oriented to person, place, and time. She appears well-developed and well-nourished. No distress.   HENT:   Nose: Nose normal.   Mouth/Throat: No oropharyngeal exudate.   Eyes: Pupils are equal, round, and reactive to light. Right eye exhibits no discharge. Left eye exhibits no discharge.   Neck: Normal range of motion.   Cardiovascular: Normal rate and intact distal pulses.    Pulmonary/Chest: Effort normal and breath sounds normal. No respiratory distress.   Neurological: She is alert and oriented to person, place, and time. She has normal reflexes. She displays normal reflexes. No cranial nerve deficit. Coordination normal.   Psychiatric: She has a normal mood and affect. Her behavior is normal. Judgment and thought content normal.          Right Shoulder Exam     Inspection/Observation   Swelling: absent  Bruising: absent  Scars: absent  Deformity: absent  Scapular Winging: absent  Scapular Dyskinesia: negative  Atrophy: absent    Tenderness   The patient is experiencing no tenderness.        Range of Motion   Active Abduction:  90 normal   Passive Abduction:  100 normal   Extension:  0 normal   Forward Flexion:  180 normal   Forward Elevation: 180 normal  Adduction: 40 normal  External Rotation 0 degrees:  50 normal   External Rotation 90 degrees: 90 normal  Internal Rotation 0 degrees:  T8 normal   Internal Rotation 90 degrees:  30 normal     Tests & Signs   Apprehension: negative  Cross Arm: negative  Drop Arm: negative  Hawkin's test: negative  Impingement: negative  Sulcus: absent  Anterosuperior Escape: negative  Lag Sign 0 degrees: negative  Lag Sign 90 degrees: negative  Lift Off Sign: negative  Belly Press: negative  Active Compression Test ([place]'s Sign): negative  Yergason's Test: negative  Speed's Test: negative  Anterior Drawer Test: 1+   Posterior Drawer Test: 1+  Relocation 90 degrees: negative  Relocation > 90 degrees: negative  Bear Hug: negative  Moving Valgus: negative  Jerk Test: negative    Other   Sensation: normal    Left Shoulder Exam     Inspection/Observation   Swelling: absent  Bruising: absent  Scars: present  Deformity: absent  Scapular Winging: absent  Scapular Dyskinesia: negative  Atrophy: absent    Tenderness   The patient is tender to palpation of the greater tuberosity and acromioclavicular joint.    Range of Motion   Active Abduction:  90 abnormal   Passive Abduction:  90 abnormal   Extension:  0 normal   Forward Flexion:  140 abnormal   Forward Elevation: 140 abnormal  Adduction: 40 normal  External Rotation 0 degrees:  40 abnormal   External Rotation 90 degrees: 20 abnormal  Internal Rotation 0 degrees:  L1 abnormal   Internal Rotation 90 degrees:  0 abnormal     Tests & Signs   Apprehension:  negative  Cross Arm: negative  Drop Arm: negative  Hawkin's test: negative  Impingement: negative  Sulcus: absent  Rotator Cuff Painful Arc/Range: moderate  Anterosuperior Escape: negative  Lag Sign 0 degrees: negative  Lag Sign 90 degrees: negative  Lift Off Sign: negative  Belly Press: negative  Active Compression test ([place]'s Sign): negative  Yergasons's Test: negative  Speed's Test: negative  Anterior Drawer Test: 1+  Posterior Drawer Test: 1+  Relocation 90 degrees: negative  Relocation > 90 degrees: negative  Bear Hug: negative  Moving Valgus: negative  Jerk Test: negative    Other   Sensation: normal     Comments:  Incisions healed      Muscle Strength   Right Upper Extremity   Shoulder Abduction: 5/5   Shoulder Internal Rotation: 5/5   Shoulder External Rotation: 5/5   Supraspinatus: 5/5/5   Subscapularis: 5/5/5   Biceps: 5/5/5   Left Upper Extremity  Shoulder Abduction: 5/5   Shoulder Internal Rotation: 5/5   Shoulder External Rotation: 4/5   Supraspinatus: 4/5/5   Subscapularis: 4/5/5   Biceps: 4/5/5     Reflexes     Left Side  Biceps:  2+  Triceps:  2+  Brachioradialis:  2+    Right Side   Biceps:  2+  Triceps:  2+  Brachioradialis:  2+    Vascular Exam     Right Pulses      Radial:                    2+      Left Pulses      Radial:                    2+      Capillary Refill  Right Hand: normal capillary refill  Left Hand: normal capillary refill    RADIOGRAPHS:  Left shoulder:  FINDINGS:  Postoperative changes identified.  Mild DJD.  No fracture or bone destruction        Assessment:       Encounter Diagnoses   Name Primary?    Left shoulder pain, unspecified chronicity Yes    S/P arthroscopy of left shoulder           Plan:       1. ASES and SF-12 was filled out today in clinic.     RTC in 3 months with Dr. Susanna Vann. Patient will fill out ASES, SF-12 and left shoulder series on return.    2. Continue PT at [facility]    3. Take medications as prescribed. Refilled phenergan.    4. Continue  periscapular and [facility]                      [place] patient questionnaires have been collected today.

## 2018-08-01 ENCOUNTER — TELEPHONE (OUTPATIENT)
Dept: FAMILY MEDICINE | Facility: CLINIC | Age: 53
End: 2018-08-01

## 2018-08-01 DIAGNOSIS — R07.9 CHEST PAIN, UNSPECIFIED TYPE: ICD-10-CM

## 2018-08-01 DIAGNOSIS — R93.89 ABNORMAL CT OF THE CHEST: ICD-10-CM

## 2018-08-01 DIAGNOSIS — R05.9 COUGH: Primary | ICD-10-CM

## 2018-08-01 NOTE — TELEPHONE ENCOUNTER
----- Message from Cherelle Newman sent at 8/1/2018  9:16 AM CDT -----  Contact: Self/[phone]  Patient called to request a referral for a Pulmonologist. Thank you.

## 2018-08-02 ENCOUNTER — CLINICAL SUPPORT (OUTPATIENT)
Dept: REHABILITATION | Facility: HOSPITAL | Age: 53
End: 2018-08-02
Attending: ORTHOPAEDIC SURGERY
Payer: MEDICAID

## 2018-08-02 DIAGNOSIS — M25.612 DECREASED ROM OF LEFT SHOULDER: ICD-10-CM

## 2018-08-02 DIAGNOSIS — M25.512 CHRONIC LEFT SHOULDER PAIN: ICD-10-CM

## 2018-08-02 DIAGNOSIS — M75.122 COMPLETE TEAR OF LEFT ROTATOR CUFF: ICD-10-CM

## 2018-08-02 DIAGNOSIS — G89.29 CHRONIC LEFT SHOULDER PAIN: ICD-10-CM

## 2018-08-02 DIAGNOSIS — M75.22 BICEPS TENDONITIS ON LEFT: Primary | ICD-10-CM

## 2018-08-02 PROCEDURE — 97110 THERAPEUTIC EXERCISES: CPT | Mod: PN

## 2018-08-02 NOTE — PROGRESS NOTES
Name: Lorraine Childers  Clinic Number: [id number]  Date of Treatment: 08/02/2018   Diagnosis:   Encounter Diagnoses   Name Primary?    Biceps tendonitis on left Yes    Chronic left shoulder pain     Complete tear of left rotator cuff     Decreased ROM of left shoulder        Progress Note:     Time in: 1215  Time Out: 1:10  Total Treatment Time: 55 minutes (1:1 with PT for 30 minutes)    Visit: 13 of 20, exp 12/31/2018    Precautions:Medium size Rotator cuff protocol & Biceps tenodesis  POW: 12 (surgery on 4/20/2018)    Subjective:    Pt returns after follow up with MD with instructions to continue therapy. Patient states the shoulder continues to be sore. Reports fair compliance with avoiding aggravating activities.     Pain Level: 0 out of 10, currently.    Objective    Patient received individual therapy to increase strength, endurance, ROM, flexibility, posture and core stabilization with activities as follows:     Lorraine Fonseca received therapeutic exercises to develop strength, endurance, ROM, flexibility, posture and core stabilization for 45 minutes including:     UBE: 6 minutes  PROM shoulder x 5 min as tolerated (all directions)   Supine ER prop at 80 degres abduction x 3 minutes x 1#  np  Shoulder flexion AAROM with dowel x 20, 5 sec hold   SL scapular retractions 2x10, 3 sec hold    SL ER 2x10 1lb  SL abduction 2x10 (pain with popping)   Seated scapular retraction 2x10, 3 sec hold  Rows to neutral (RTB) 3 x 10  Bilateral shoulder extension RTB 3 x 10  -4 way isometrics 2 x 10    Manual therapy consisting of STM and joint mobilization x 10 minutes  STM to Left upper trap - not today  Gentle oscillations to the L GH joint with all PROM and prone pendulums     Written Home Exercises Provided: Updated HEP provided today; see patient instructions.   Pt demo good understanding of the education provided. Lorraine Fonseca demonstrated good return demonstration of activities.     Assessment:     Fair  response to progression of RTC and scapular stabilization therex. Moderate scapular compensation with forward elevation.  Pt demonstrates a good understanding of the education provided and a good return demonstration of activities. Pt  Requires skilled supervision to complete and progress home program.    Medical necessity is demonstrated by the following IMPAIRMENTS:  -pain   -decreased range of motion/flexibility    -decreased muscle strength   -impaired function -    -decreased ADL ability  -decreased recreational ability     Patient is making good progress towards established goals.    Short Term Goals (8 Weeks):   MET  7/10/18  1. Pt will report 50% reduction in L shoulder pain for ease with ADL's  2. Pt will tolerate progression of PROM to protocol and surgeon established goals   3. Pt will tolerate progression of scapular strengthening from manual resistance with good results  Long Term Goals (12 Weeks):  In progress  1. Pt will demonstrate full PROM of the L shoulder without compensation   2. Pt will report compliance with post op restrictions as described on post op protocol  3. Pt will tolerated progression to AAROM of the L shoulder for improved independence with daily hygeine and body clothing  LONG term goals (24 weeks)  1. Pt will demonstrate full painfree AROM of the L shoulder for ease with ADLs  2. Pt will demonstrate full functional strength of the L shoulder in all planes for ease with return to work related activities  3. Pt will demonstrate full periscapular strength for ease with shoulder mechanics and household chores  4. Pt will report being independent with HEP for maintenance of improvements gained during therapy sessions    CMS Impairment/Limitation/Restriction for FOTO Shoulder Survey  Status Limitation G-Code CMS Severity Modifier  Intake 13% 87%  Predicted 56% 44% Goal Status+ CK - At least 40 percent but less than 60 percent  7/10/2018 62% 38% Current Status CJ - At least 20 percent  but less than 40 percent  Plan      Certification Period: 4/25/18 to 12/25/18    Recommended Treatment Plan: 1-2 times per week for 28 weeks: Electrical Stimulation PRN, Iontophoresis (with dexamethasone PRN), Manual Therapy, Moist Heat/ Ice, Neuromuscular Re-ed, Patient Education, Therapeutic Activites, Therapeutic Exercise and Other therapeutic taping, dry needling, aquatic therapy     Other Recommendations: PTA to be involved in pt POC.    Continue with established Plan of Care towards PT goals.     Ariel Davila, PT   8/2/2018

## 2018-08-03 NOTE — TELEPHONE ENCOUNTER
Patient notified that her order has been placed and that she will be contacted to schedule an appointment.  Verbalized understanding.

## 2018-08-10 ENCOUNTER — CLINICAL SUPPORT (OUTPATIENT)
Dept: REHABILITATION | Facility: HOSPITAL | Age: 53
End: 2018-08-10
Attending: ORTHOPAEDIC SURGERY
Payer: MEDICAID

## 2018-08-10 DIAGNOSIS — M25.612 DECREASED ROM OF LEFT SHOULDER: ICD-10-CM

## 2018-08-10 DIAGNOSIS — M75.122 COMPLETE TEAR OF LEFT ROTATOR CUFF: ICD-10-CM

## 2018-08-10 DIAGNOSIS — G89.29 CHRONIC LEFT SHOULDER PAIN: ICD-10-CM

## 2018-08-10 DIAGNOSIS — M75.22 BICEPS TENDONITIS ON LEFT: Primary | ICD-10-CM

## 2018-08-10 DIAGNOSIS — M25.512 CHRONIC LEFT SHOULDER PAIN: ICD-10-CM

## 2018-08-10 PROCEDURE — 97110 THERAPEUTIC EXERCISES: CPT | Mod: PN

## 2018-08-10 NOTE — PROGRESS NOTES
Name: Lorraine Childers  Clinic Number: [id number]  Date of Treatment: 08/10/2018   Diagnosis:   Encounter Diagnoses   Name Primary?    Biceps tendonitis on left Yes    Chronic left shoulder pain     Complete tear of left rotator cuff     Decreased ROM of left shoulder        Progress Note:     Time in: 3:00  Time Out: 3:55  Total Treatment Time: 55 minutes     Visit: 14 of 20, exp 12/31/2018    Precautions:Medium size Rotator cuff protocol & Biceps tenodesis  POW: 12 (surgery on 4/20/2018)    Subjective:      Pt returns after several weeks due to busy schedule. Reports fair compliance with avoiding aggravating activities.  States she is 75% of normal.       Pain Level: 3 out of 10, currently.    Objective    Shoulder   Left       AROM PROM MMT   flexion 130 150 4-   abduction 85 90 4-   IR at 90 abd NT 38 4-   ER at 90 abd NT 73 4-          Patient received individual therapy to increase strength, endurance, ROM, flexibility, posture and core stabilization with activities as follows:     Lorraine Fonseca received therapeutic exercises to develop strength, endurance, ROM, flexibility, posture and core stabilization for 45 minutes including:     UBE: 6 minutes  PROM shoulder x 5 min as tolerated (all directions)   Supine ER prop at 80 degres abduction x 3 minutes x 1#  np  Shoulder flexion AAROM with dowel x 20, 5 sec hold   SL scapular retractions 2x10, 3 sec hold    SL ER 2x10 1lb  SL abduction 2x10 (pain with popping)   Seated scapular retraction 2x10, 3 sec hold  Rows to neutral (RTB) 3 x 10  Bilateral shoulder extension RTB 3 x 10  -4 way isometrics 2 x 10    Manual therapy consisting of STM and joint mobilization x 10 minutes  STM to Left upper trap - not today  Gentle oscillations to the L GH joint with all PROM and prone pendulums     Written Home Exercises Provided: Updated HEP provided today; see patient instructions.   Pt demo good understanding of the education provided. Lorraine Fonseca demonstrated  good return demonstration of activities.     Assessment:     Improved response to progression of RTC and scapular stabilization therex.  Improved GH AROM in all planes.  Pt demonstrates a good understanding of the education provided and a good return demonstration of activities. Pt  Requires skilled supervision to complete and progress home program.    Medical necessity is demonstrated by the following IMPAIRMENTS:  -pain   -decreased range of motion/flexibility    -decreased muscle strength   -impaired function -    -decreased ADL ability  -decreased recreational ability     Patient is making good progress towards established goals.    Short Term Goals (8 Weeks):   MET  7/10/18  1. Pt will report 50% reduction in L shoulder pain for ease with ADL's  2. Pt will tolerate progression of PROM to protocol and surgeon established goals   3. Pt will tolerate progression of scapular strengthening from manual resistance with good results  Long Term Goals (12 Weeks):  Partially MET updated 8/10/10  1. Pt will demonstrate full PROM of the L shoulder without compensation   2. Pt will report compliance with post op restrictions as described on post op protocol  3. Pt will tolerated progression to AAROM of the L shoulder for improved independence with daily hygeine and body clothing  LONG term goals (24 weeks)  1. Pt will demonstrate full painfree AROM of the L shoulder for ease with ADLs  2. Pt will demonstrate full functional strength of the L shoulder in all planes for ease with return to work related activities  3. Pt will demonstrate full periscapular strength for ease with shoulder mechanics and household chores  4. Pt will report being independent with HEP for maintenance of improvements gained during therapy sessions    CMS Impairment/Limitation/Restriction for FOTO Shoulder Survey  Status Limitation G-Code CMS Severity Modifier  Intake 13% 87%  Predicted 56% 44% Goal Status+ CK - At least 40 percent but less than 60  percent  7/10/2018 62% 38% Current Status CJ - At least 20 percent but less than 40 percent  Plan      Certification Period: 4/25/18 to 12/25/18    Recommended Treatment Plan: 1-2 times per week for 28 weeks: Electrical Stimulation PRN, Iontophoresis (with dexamethasone PRN), Manual Therapy, Moist Heat/ Ice, Neuromuscular Re-ed, Patient Education, Therapeutic Activites, Therapeutic Exercise and Other therapeutic taping, dry needling, aquatic therapy     Other Recommendations: PTA to be involved in pt POC.    Continue with established Plan of Care towards PT goals.     Ariel Davila, PT   8/10/2018

## 2018-08-15 ENCOUNTER — DOCUMENTATION ONLY (OUTPATIENT)
Dept: REHABILITATION | Facility: HOSPITAL | Age: 53
End: 2018-08-15

## 2018-08-16 ENCOUNTER — TELEPHONE (OUTPATIENT)
Dept: FAMILY MEDICINE | Facility: CLINIC | Age: 53
End: 2018-08-16

## 2018-08-16 ENCOUNTER — OFFICE VISIT (OUTPATIENT)
Dept: URGENT CARE | Facility: CLINIC | Age: 53
End: 2018-08-16
Payer: MEDICAID

## 2018-08-16 ENCOUNTER — HOSPITAL ENCOUNTER (OUTPATIENT)
Dept: RADIOLOGY | Facility: HOSPITAL | Age: 53
Discharge: HOME | End: 2018-08-16
Attending: NURSE PRACTITIONER
Payer: MEDICAID

## 2018-08-16 VITALS
RESPIRATION RATE: 20 BRPM | TEMPERATURE: 97 F | BODY MASS INDEX: 33.9 KG/M2 | OXYGEN SATURATION: 98 % | SYSTOLIC BLOOD PRESSURE: 106 MMHG | HEART RATE: 77 BPM | HEIGHT: 67 IN | WEIGHT: 216 LBS | DIASTOLIC BLOOD PRESSURE: 67 MMHG

## 2018-08-16 DIAGNOSIS — M79.89 LEG SWELLING: Primary | ICD-10-CM

## 2018-08-16 DIAGNOSIS — M79.89 LEG SWELLING: ICD-10-CM

## 2018-08-16 PROCEDURE — 93971 EXTREMITY STUDY: CPT | Mod: TC

## 2018-08-16 PROCEDURE — 93971 EXTREMITY STUDY: CPT | Mod: 26,,, | Performed by: RADIOLOGY

## 2018-08-16 PROCEDURE — 99214 OFFICE O/P EST MOD 30 MIN: CPT | Mod: S$GLB,,, | Performed by: NURSE PRACTITIONER

## 2018-08-16 NOTE — PROGRESS NOTES
"Subjective:       Patient ID: Lorraine Childers is a 52 y.o. female.    Vitals:  height is 5' 7" (1.702 m) and weight is 98 kg (216 lb). Her temperature is 97.1 °F (36.2 °C). Her blood pressure is 106/67 and her pulse is 77. Her respiration is 20 and oxygen saturation is 98%.     Chief Complaint: Knee Pain    Pt states she woke up this morning with right knee pain and swelling . She did not injure is or don't recall anything happen to it  Tender to touch to the right lower extremity         Knee Pain    Incident onset: this morning. There was no injury mechanism. The pain is present in the right knee. The quality of the pain is described as aching. The pain is at a severity of 5/10. The pain is moderate. The pain has been constant since onset. The symptoms are aggravated by weight bearing. She has tried nothing for the symptoms.     Review of Systems   Constitution: Negative for chills and fever.   HENT: Negative for sore throat.    Eyes: Negative for blurred vision.   Cardiovascular: Negative for chest pain, dyspnea on exertion and irregular heartbeat.   Respiratory: Negative for shortness of breath.    Skin: Negative for rash.   Musculoskeletal: Positive for joint pain and joint swelling. Negative for back pain.   Gastrointestinal: Negative for abdominal pain, diarrhea, nausea and vomiting.   Neurological: Negative for headaches.   Psychiatric/Behavioral: The patient is not nervous/anxious.        Objective:      Physical Exam   Constitutional: She is oriented to person, place, and time. She appears well-developed and well-nourished.   HENT:   Head: Normocephalic and atraumatic.   Eyes: EOM are normal. Pupils are equal, round, and reactive to light.   Neck: Normal range of motion. Neck supple.   Pulmonary/Chest: Effort normal. No respiratory distress.   Musculoskeletal: Normal range of motion.        Right lower leg: She exhibits tenderness and swelling.        Legs:  Neurological: She is alert and oriented to " person, place, and time.   Skin: Skin is warm and dry.   Psychiatric: She has a normal mood and affect. Her behavior is normal. Judgment and thought content normal.   Vitals reviewed.      Assessment:       1. Leg swelling        Plan:         Leg swelling  -     US Lower Extremity Veins Right; Future; Expected date: 08/16/2018    April had US lower and was negative but patient worried about DVT will repeat  Patient states she is going to call her PCP and tell him to watch for results as well   Patient Instructions   Go have outpatient US performed today, we will call with results    Call 911 or go to ER for evaluation  Call 911 right away if you have the following symptoms. :  · Chest pain  · Nausea/vomiting increased  · Trouble breathing  · Fast heartbeat  · Sweating  · Fainting  · Coughing (may cough up blood)  · Heavy or uncontrolled bleeding

## 2018-08-16 NOTE — PATIENT INSTRUCTIONS
Go have outpatient US performed today, we will call with results    Call 911 or go to ER for evaluation  Call 911 right away if you have the following symptoms. :  · Chest pain  · Nausea/vomiting increased  · Trouble breathing  · Fast heartbeat  · Sweating  · Fainting  · Coughing (may cough up blood)  · Heavy or uncontrolled bleeding

## 2018-08-16 NOTE — TELEPHONE ENCOUNTER
----- Message from Reyna Hodges sent at 8/16/2018  8:12 AM CDT -----  Contact: self  Please call pt to discuss being seen today for leg swelling. Please call [phone].

## 2018-08-17 ENCOUNTER — DOCUMENTATION ONLY (OUTPATIENT)
Dept: REHABILITATION | Facility: HOSPITAL | Age: 53
End: 2018-08-17

## 2018-08-17 NOTE — PROGRESS NOTES
Physical therapy    Patient did not show up for today's appointment.   This is second no show for this week.

## 2018-08-28 ENCOUNTER — TELEPHONE (OUTPATIENT)
Dept: FAMILY MEDICINE | Facility: CLINIC | Age: 53
End: 2018-08-28

## 2018-08-28 RX ORDER — PREDNISONE 20 MG/1
20 TABLET ORAL DAILY
Qty: 10 TABLET | Refills: 0 | Status: SHIPPED | OUTPATIENT
Start: 2018-08-28 | End: 2018-09-07

## 2018-08-31 ENCOUNTER — OFFICE VISIT (OUTPATIENT)
Dept: SPORTS MEDICINE | Facility: CLINIC | Age: 53
End: 2018-08-31
Payer: MEDICAID

## 2018-08-31 VITALS
DIASTOLIC BLOOD PRESSURE: 71 MMHG | HEIGHT: 67 IN | BODY MASS INDEX: 33.9 KG/M2 | WEIGHT: 216 LBS | HEART RATE: 71 BPM | SYSTOLIC BLOOD PRESSURE: 117 MMHG

## 2018-08-31 DIAGNOSIS — M25.512 CHRONIC LEFT SHOULDER PAIN: ICD-10-CM

## 2018-08-31 DIAGNOSIS — G89.29 CHRONIC LEFT SHOULDER PAIN: ICD-10-CM

## 2018-08-31 DIAGNOSIS — Z98.890 S/P ARTHROSCOPY OF LEFT SHOULDER: Primary | ICD-10-CM

## 2018-08-31 DIAGNOSIS — M25.612 DECREASED ROM OF LEFT SHOULDER: ICD-10-CM

## 2018-08-31 DIAGNOSIS — M75.102 ROTATOR CUFF SYNDROME OF LEFT SHOULDER: ICD-10-CM

## 2018-08-31 PROCEDURE — 99214 OFFICE O/P EST MOD 30 MIN: CPT | Mod: PBBFAC,PO | Performed by: PHYSICIAN ASSISTANT

## 2018-08-31 PROCEDURE — 99999 PR PBB SHADOW E&M-EST. PATIENT-LVL IV: CPT | Mod: PBBFAC,,, | Performed by: PHYSICIAN ASSISTANT

## 2018-08-31 PROCEDURE — 99214 OFFICE O/P EST MOD 30 MIN: CPT | Mod: S$PBB,,, | Performed by: PHYSICIAN ASSISTANT

## 2018-08-31 RX ORDER — MELOXICAM 15 MG/1
TABLET ORAL
Qty: 14 TABLET | Refills: 0 | Status: ON HOLD | OUTPATIENT
Start: 2018-08-31 | End: 2018-10-29 | Stop reason: CLARIF

## 2018-09-04 ENCOUNTER — HOSPITAL ENCOUNTER (EMERGENCY)
Facility: HOSPITAL | Age: 53
Discharge: HOME | End: 2018-09-04
Attending: EMERGENCY MEDICINE
Payer: MEDICAID

## 2018-09-04 VITALS
BODY MASS INDEX: 33.43 KG/M2 | SYSTOLIC BLOOD PRESSURE: 139 MMHG | DIASTOLIC BLOOD PRESSURE: 85 MMHG | HEART RATE: 75 BPM | TEMPERATURE: 98 F | HEIGHT: 67 IN | OXYGEN SATURATION: 97 % | RESPIRATION RATE: 18 BRPM | WEIGHT: 213 LBS

## 2018-09-04 DIAGNOSIS — M62.830 MUSCLE SPASM OF BACK: Primary | ICD-10-CM

## 2018-09-04 LAB
ALBUMIN SERPL BCP-MCNC: 3.9 G/DL
ALP SERPL-CCNC: 72 U/L
ALT SERPL W/O P-5'-P-CCNC: 14 U/L
ANION GAP SERPL CALC-SCNC: 12 MMOL/L
AST SERPL-CCNC: 12 U/L
BACTERIA #/AREA URNS HPF: ABNORMAL /HPF
BASOPHILS # BLD AUTO: 0.03 K/UL
BASOPHILS NFR BLD: 0.6 %
BILIRUB SERPL-MCNC: 0.5 MG/DL
BILIRUB UR QL STRIP: NEGATIVE
BUN SERPL-MCNC: 19 MG/DL
CALCIUM SERPL-MCNC: 9.2 MG/DL
CHLORIDE SERPL-SCNC: 103 MMOL/L
CLARITY UR: ABNORMAL
CO2 SERPL-SCNC: 23 MMOL/L
COLOR UR: YELLOW
CREAT SERPL-MCNC: 0.8 MG/DL
DIFFERENTIAL METHOD: ABNORMAL
EOSINOPHIL # BLD AUTO: 0.1 K/UL
EOSINOPHIL NFR BLD: 2.9 %
ERYTHROCYTE [DISTWIDTH] IN BLOOD BY AUTOMATED COUNT: 14.3 %
EST. GFR  (AFRICAN AMERICAN): >60 ML/MIN/1.73 M^2
EST. GFR  (NON AFRICAN AMERICAN): >60 ML/MIN/1.73 M^2
GLUCOSE SERPL-MCNC: 100 MG/DL
GLUCOSE UR QL STRIP: NEGATIVE
HCT VFR BLD AUTO: 40.1 %
HGB BLD-MCNC: 13.6 G/DL
HGB UR QL STRIP: ABNORMAL
KETONES UR QL STRIP: ABNORMAL
LEUKOCYTE ESTERASE UR QL STRIP: ABNORMAL
LIPASE SERPL-CCNC: 21 U/L
LYMPHOCYTES # BLD AUTO: 0.7 K/UL
LYMPHOCYTES NFR BLD: 13.8 %
MCH RBC QN AUTO: 29.5 PG
MCHC RBC AUTO-ENTMCNC: 33.9 G/DL
MCV RBC AUTO: 87 FL
MICROSCOPIC COMMENT: ABNORMAL
MONOCYTES # BLD AUTO: 0.3 K/UL
MONOCYTES NFR BLD: 6.9 %
NEUTROPHILS # BLD AUTO: 3.6 K/UL
NEUTROPHILS NFR BLD: 75.6 %
NITRITE UR QL STRIP: NEGATIVE
PH UR STRIP: 5 [PH] (ref 5–8)
PLATELET # BLD AUTO: 204 K/UL
PMV BLD AUTO: 9 FL
POCT GLUCOSE: 109 MG/DL (ref 70–110)
POTASSIUM SERPL-SCNC: 3.7 MMOL/L
PROT SERPL-MCNC: 6.9 G/DL
PROT UR QL STRIP: NEGATIVE
RBC # BLD AUTO: 4.61 M/UL
RBC #/AREA URNS HPF: 5 /HPF (ref 0–4)
SODIUM SERPL-SCNC: 138 MMOL/L
SP GR UR STRIP: 1.02 (ref 1–1.03)
SQUAMOUS #/AREA URNS HPF: 25 /HPF
URN SPEC COLLECT METH UR: ABNORMAL
UROBILINOGEN UR STRIP-ACNC: ABNORMAL EU/DL
WBC # BLD AUTO: 4.78 K/UL
WBC #/AREA URNS HPF: 5 /HPF (ref 0–5)

## 2018-09-04 PROCEDURE — 96365 THER/PROPH/DIAG IV INF INIT: CPT

## 2018-09-04 PROCEDURE — 99284 EMERGENCY DEPT VISIT MOD MDM: CPT | Mod: 25

## 2018-09-04 PROCEDURE — 83690 ASSAY OF LIPASE: CPT

## 2018-09-04 PROCEDURE — 96372 THER/PROPH/DIAG INJ SC/IM: CPT

## 2018-09-04 PROCEDURE — 25000003 PHARM REV CODE 250: Performed by: EMERGENCY MEDICINE

## 2018-09-04 PROCEDURE — 82962 GLUCOSE BLOOD TEST: CPT

## 2018-09-04 PROCEDURE — 81000 URINALYSIS NONAUTO W/SCOPE: CPT

## 2018-09-04 PROCEDURE — 80053 COMPREHEN METABOLIC PANEL: CPT

## 2018-09-04 PROCEDURE — 85025 COMPLETE CBC W/AUTO DIFF WBC: CPT

## 2018-09-04 PROCEDURE — 96375 TX/PRO/DX INJ NEW DRUG ADDON: CPT

## 2018-09-04 PROCEDURE — 63600175 PHARM REV CODE 636 W HCPCS: Performed by: EMERGENCY MEDICINE

## 2018-09-04 RX ORDER — ORPHENADRINE CITRATE 100 MG/1
100 TABLET, EXTENDED RELEASE ORAL 2 TIMES DAILY
Qty: 20 TABLET | Refills: 0 | Status: SHIPPED | OUTPATIENT
Start: 2018-09-04 | End: 2018-09-14

## 2018-09-04 RX ORDER — ORPHENADRINE CITRATE 30 MG/ML
60 INJECTION INTRAMUSCULAR; INTRAVENOUS
Status: COMPLETED | OUTPATIENT
Start: 2018-09-04 | End: 2018-09-04

## 2018-09-04 RX ORDER — KETOROLAC TROMETHAMINE 30 MG/ML
30 INJECTION, SOLUTION INTRAMUSCULAR; INTRAVENOUS
Status: COMPLETED | OUTPATIENT
Start: 2018-09-04 | End: 2018-09-04

## 2018-09-04 RX ADMIN — PROMETHAZINE HYDROCHLORIDE 25 MG: 25 INJECTION INTRAMUSCULAR; INTRAVENOUS at 10:09

## 2018-09-04 RX ADMIN — KETOROLAC TROMETHAMINE 30 MG: 30 INJECTION INTRAMUSCULAR; INTRAVENOUS at 10:09

## 2018-09-04 RX ADMIN — ORPHENADRINE CITRATE 60 MG: 30 INJECTION INTRAMUSCULAR; INTRAVENOUS at 11:09
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
## 2018-09-05 NOTE — ED TRIAGE NOTES
Lower back pain since 1500 today. Pt states she began vomiting a couple times about 1 hr PTA. Pt states she was just seen a month ago for the same thing. Pt AAOX3. Pt is in no distress at this time.

## 2018-09-06 NOTE — PROGRESS NOTES
Subjective:          Chief Complaint: Lorraine Childers is a 52 y.o. female who had concerns including Pain of the Left Shoulder.    Patient is here for 4 month unplanned post op left shoulder. She was doing PT at [facility] and doing well until she was unable to get into PT starting 2 weeks ago due to availability issues. She has not done PT in over 2 weeks and reports that that is when her shoulder began hurting worse. She is RHD. She was doing well prior to this. Pain is mostly located of the anterior shoulder. She is taking celebrex with no pain relief.  Pain today is 7/10 today. She has been taking 1/2 percocet 10mg before bed time as needed for pain and icing her shoulder. Shoulder hurts to lay on it.     She denies any new injuries, trauma, or falls.     DATE OF PROCEDURE: 4/20/2018     ATTENDING SURGEON: Surgeon(s) and Role:     * Susanna Vann MD - Primary  Assistants:  Juan Luis Smith MD-Fellow  Lisette Akbar PA-C-Assistant     PREOPERATIVE DIAGNOSIS:  Left shoulder   Tendinitis, Biceps M75.20 A-C Arthritis M12.9, Rotator Cuff Syndrome/Disorder  M75.100 and Tear, Rotator Cuff, Non-traumatic M75.100     POSTOPERATIVE DIAGNOSIS: Left shoulder   Tendinitis, Biceps M75.20 A-C Arthritis M12.9, Rotator Cuff Syndrome/Disorder  M75.100, Tear, Rotator Cuff, Non-traumatic M75.100 and Tendinitis, Biceps M75.20            PROCEDURES PERFORMED:  1. Left shoulder Arthroscopic rotator cuff repair CPT - 31734     2. Left shoulder Biceps tenodesis CPT - 01443     3. Left shoulder Arthroscopic distal clavicle excision CPT - 86747     4. Left shoulder Amniox Arthrocentesis, 56346      Handedness: right-handed  Sport played:    Level:            Pain   This is a chronic problem. The current episode started more than 1 month ago. The problem occurs intermittently. The problem has been rapidly worsening. Pertinent negatives include no chest pain, fever, joint swelling, numbness or rash. The symptoms are aggravated by  exertion. The treatment provided no relief.       Review of Systems   Constitution: Negative for fever and night sweats.   HENT: Negative for hearing loss.    Eyes: Negative for blurred vision and visual disturbance.   Cardiovascular: Negative for chest pain and leg swelling.   Respiratory: Negative for shortness of breath.    Endocrine: Negative for polyuria.   Hematologic/Lymphatic: Negative for bleeding problem.   Skin: Negative for rash.   Musculoskeletal: Positive for joint pain. Negative for back pain, joint swelling, muscle cramps and muscle weakness.   Gastrointestinal: Negative for melena.   Genitourinary: Negative for hematuria.   Neurological: Negative for loss of balance, numbness and paresthesias.   Psychiatric/Behavioral: Negative for altered mental status.       Pain Related Questions  Over the past 3 days, what was your average pain during activity? (I.e. running, jogging, walking, climbing stairs, getting dressed, ect.): 5  Over the past 3 days, what was your highest pain level?: 6  Over the past 3 days, what was your lowest pain level? : 5    Other  How many nights a week are you awakened by your affected body part?: 7  Was the patient's HEIGHT measured or patient reported?: Patient Reported  Was the patient's WEIGHT measured or patient reported?: Measured      Objective:        General: Lorraine Fonseca is well-developed, well-nourished, appears stated age, in no acute distress, alert and oriented to time, place and person.     General    Vitals reviewed.  Constitutional: She is oriented to person, place, and time. She appears well-developed and well-nourished. No distress.   HENT:   Nose: Nose normal.   Mouth/Throat: No oropharyngeal exudate.   Eyes: Pupils are equal, round, and reactive to light. Right eye exhibits no discharge. Left eye exhibits no discharge.   Neck: Normal range of motion.   Cardiovascular: Normal rate and intact distal pulses.    Pulmonary/Chest: Effort normal and breath sounds  normal. No respiratory distress.   Neurological: She is alert and oriented to person, place, and time. She has normal reflexes. She displays normal reflexes. No cranial nerve deficit. Coordination normal.   Psychiatric: She has a normal mood and affect. Her behavior is normal. Judgment and thought content normal.         Right Shoulder Exam     Inspection/Observation   Swelling: absent  Bruising: absent  Scars: absent  Deformity: absent  Scapular Winging: absent  Scapular Dyskinesia: negative  Atrophy: absent    Tenderness   The patient is experiencing no tenderness.    Range of Motion   Active abduction:  90 normal   Passive abduction:  100 normal   Extension:  0 normal   Forward Flexion:  180 normal   Forward Elevation: 180 normal  Adduction: 40 normal  External Rotation 0 degrees:  50 normal   External Rotation 90 degrees: 90 normal  Internal rotation 0 degrees:  T8 normal   Internal rotation 90 degrees:  30 normal     Tests & Signs   Apprehension: negative  Cross arm: negative  Drop arm: negative  Ucriel test: negative  Impingement: negative  Sulcus: absent  Anterosuperior Escape: negative  Lag Sign 0 degrees: negative  Lag Sign 90 degrees: negative  Lift Off Sign: negative  Belly Press: negative  Active Compression Test (Canton's Sign): negative  Yergason's Test: negative  Speed's Test: negative  Anterior Drawer Test: 1+   Posterior Drawer Test: 1+  Relocation 90 degrees: negative  Relocation > 90 degrees: negative  Bear Hug: negative  Moving Valgus: negative  Jerk Test: negative    Other   Sensation: normal    Left Shoulder Exam     Inspection/Observation   Swelling: absent  Bruising: absent  Scars: present  Deformity: absent  Scapular Winging: absent  Scapular Dyskinesia: negative  Atrophy: absent    Tenderness   The patient is tender to palpation of the greater tuberosity and biceps tendon.    Range of Motion   Active abduction:  90 abnormal   Passive abduction:  90 abnormal   Extension:  0 normal    Forward Flexion:  140 abnormal   Forward Elevation: 140 abnormal  Adduction: 40 normal  External Rotation 0 degrees:  40 abnormal   External Rotation 90 degrees: 20 abnormal  Internal rotation 0 degrees:  L1 abnormal   Internal rotation 90 degrees:  0 abnormal     Tests & Signs   Apprehension: negative  Cross arm: negative  Drop arm: negative  Curiel test: negative  Impingement: negative  Sulcus: absent  Rotator Cuff Painful Arc/Range: moderate  Anterosuperior Escape: negative  Lag Sign 0 degrees: negative  Lag Sign 90 degrees: negative  Lift Off Sign: negative  Belly Press: negative  Active Compression test ([place]'s Sign): negative  Yergasons's Test: negative  Speed's Test: negative  Anterior Drawer Test: 1+  Posterior Drawer Test: 1+  Relocation 90 degrees: negative  Relocation > 90 degrees: negative  Bear Hug: negative  Moving Valgus: negative  Jerk Test: negative    Other   Sensation: normal     Comments:  Incisions healed    No signs of worsened weakness of the rotator cuff muscles.       Muscle Strength   Right Upper Extremity   Shoulder Abduction: 5/5   Shoulder Internal Rotation: 5/5   Shoulder External Rotation: 5/5   Supraspinatus: 5/5/5   Subscapularis: 5/5/5   Biceps: 5/5/5   Left Upper Extremity  Shoulder Abduction: 5/5   Shoulder Internal Rotation: 5/5   Shoulder External Rotation: 4/5   Supraspinatus: 4/5/5   Subscapularis: 4/5/5   Biceps: 4/5/5     Reflexes     Left Side  Biceps:  2+  Triceps:  2+  Brachioradialis:  2+    Right Side   Biceps:  2+  Triceps:  2+  Brachioradialis:  2+    Vascular Exam     Right Pulses      Radial:                    2+      Left Pulses      Radial:                    2+      Capillary Refill  Right Hand: normal capillary refill  Left Hand: normal capillary refill    RADIOGRAPHS:  Left shoulder:  FINDINGS:  Postoperative changes identified.  Mild DJD.  No fracture or bone destruction        Assessment:       Encounter Diagnoses   Name Primary?    S/P arthroscopy of  left shoulder Yes    Chronic left shoulder pain     Rotator cuff syndrome of left shoulder     Decreased ROM of left shoulder           Plan:       1. ASES and SF-12 was not filled out today in clinic.     RTC in 2 months with Dr. Susanna Vann as previously scheduled or sooner if things worsen. Patient will fill out ASES, SF-12 and left shoulder series on return.    2. D/c PT at [facility] due to appointment scheduling difficulties. Start at [facility].   Physical therapy at [facility] for continued rehab following Shoulder rotator cuff repair 4 months ago by Dr. Vann. Patient has previously been doing PT at [facility] but now having availability issues getting in. Use modalities and dry needling if possible     3. Take medications as prescribed. D/c celebrex. Start mobic daily with food.     4. Continue periscapular and AAROM HEP    I made the decision to obtain old records of the patient including previous notes and imaging. New imaging was ordered today of the extremity or extremities evaluated. I independently reviewed and interpreted the radiographs and/or MRIs today as well as prior imaging.                [facility] patient questionnaires have been collected today.

## 2018-09-12 ENCOUNTER — TELEPHONE (OUTPATIENT)
Dept: SPORTS MEDICINE | Facility: CLINIC | Age: 53
End: 2018-09-12

## 2018-09-12 NOTE — TELEPHONE ENCOUNTER
"----- Message from Kiya Cohen sent at 9/12/2018 10:41 AM CDT -----  Contact: Self  Patient states she needs to be seen "as soon as possible" by someone and she cannot wait until next week.  Patient can be reached at [phone]  "

## 2018-09-12 NOTE — TELEPHONE ENCOUNTER
Called patient in regards to her message about wanting to set up an appointment as soon as possible. No answer, mailbox is full and unable to leave message.

## 2018-09-12 NOTE — TELEPHONE ENCOUNTER
Scheduled the patient for Monday with Dr. Vann.   ----- Message from Aliza Sharma sent at 9/12/2018  2:51 PM CDT -----  Contact: self  Pt called returning a call back from [facility] regarding an appt with Dr. Vann. Pt could be reached at [phone]

## 2018-09-17 ENCOUNTER — HOSPITAL ENCOUNTER (OUTPATIENT)
Dept: RADIOLOGY | Facility: HOSPITAL | Age: 53
Discharge: HOME | End: 2018-09-17
Attending: ORTHOPAEDIC SURGERY
Payer: MEDICAID

## 2018-09-17 ENCOUNTER — OFFICE VISIT (OUTPATIENT)
Dept: SPORTS MEDICINE | Facility: CLINIC | Age: 53
End: 2018-09-17
Payer: MEDICAID

## 2018-09-17 VITALS
SYSTOLIC BLOOD PRESSURE: 122 MMHG | DIASTOLIC BLOOD PRESSURE: 71 MMHG | WEIGHT: 213 LBS | HEIGHT: 67 IN | BODY MASS INDEX: 33.43 KG/M2 | HEART RATE: 85 BPM

## 2018-09-17 DIAGNOSIS — M25.612 DECREASED ROM OF LEFT SHOULDER: ICD-10-CM

## 2018-09-17 DIAGNOSIS — M25.512 CHRONIC LEFT SHOULDER PAIN: ICD-10-CM

## 2018-09-17 DIAGNOSIS — M25.552 LEFT HIP PAIN: Primary | ICD-10-CM

## 2018-09-17 DIAGNOSIS — M75.102 ROTATOR CUFF SYNDROME OF LEFT SHOULDER: ICD-10-CM

## 2018-09-17 DIAGNOSIS — M54.16 LUMBAR RADICULOPATHY, ACUTE: ICD-10-CM

## 2018-09-17 DIAGNOSIS — G89.29 CHRONIC LEFT SHOULDER PAIN: ICD-10-CM

## 2018-09-17 DIAGNOSIS — M25.552 LEFT HIP PAIN: ICD-10-CM

## 2018-09-17 DIAGNOSIS — M75.22 BICEPS TENDONITIS ON LEFT: ICD-10-CM

## 2018-09-17 DIAGNOSIS — M75.102 ROTATOR CUFF SYNDROME, LEFT: ICD-10-CM

## 2018-09-17 PROCEDURE — 73521 X-RAY EXAM HIPS BI 2 VIEWS: CPT | Mod: 26,,, | Performed by: RADIOLOGY

## 2018-09-17 PROCEDURE — 73521 X-RAY EXAM HIPS BI 2 VIEWS: CPT | Mod: TC,FY,PO

## 2018-09-17 PROCEDURE — 99214 OFFICE O/P EST MOD 30 MIN: CPT | Mod: PBBFAC,25,PO | Performed by: ORTHOPAEDIC SURGERY

## 2018-09-17 PROCEDURE — 99214 OFFICE O/P EST MOD 30 MIN: CPT | Mod: S$PBB,,, | Performed by: ORTHOPAEDIC SURGERY

## 2018-09-17 PROCEDURE — 99999 PR PBB SHADOW E&M-EST. PATIENT-LVL IV: CPT | Mod: PBBFAC,,, | Performed by: ORTHOPAEDIC SURGERY

## 2018-09-17 RX ORDER — DIAZEPAM 5 MG/1
5 TABLET ORAL EVERY 6 HOURS PRN
Qty: 3 TABLET | Refills: 0 | Status: SHIPPED | OUTPATIENT
Start: 2018-09-17 | End: 2020-05-11 | Stop reason: SDUPTHER

## 2018-09-17 NOTE — PROGRESS NOTES
Subjective:          Chief Complaint: Lorraine Childers is a 52 y.o. female who had concerns including Pain of the Left Hip.    Here today for eval of L hip and lumbar pain with radiculopathy to LLE. + numbness to thighs bilaterally. States she has had several episodes of acute back/buttock/lateral thigh pain on the L. Have been so severe she has N/v. Seen in ER x2 and CT abdomen performed which showed no intra-abdominal abnormality. Referred to ortho for management.    As far as her L shoulder she is doing well.  Patient is here for 4 month unplanned post op left shoulder. She was doing PT at [facility] and doing well until she was unable to get into PT starting 2 weeks ago due to availability issues. She has not done PT in over 2 weeks and reports that that is when her shoulder began hurting worse. She is RHD. She was doing well prior to this. Pain is mostly located of the anterior shoulder. She is taking celebrex with no pain relief.  Pain today is 7/10 today. She has been taking 1/2 percocet 10mg before bed time as needed for pain and icing her shoulder. Shoulder hurts to lay on it. She has missed PT for her left shoulder duet to a scheduling issue. She is going to try to set up PT at [facility].    She denies any new injuries, trauma, or falls.     DATE OF PROCEDURE: 4/20/2018     ATTENDING SURGEON: Surgeon(s) and Role:     * Susanna Vann MD - Primary  Assistants:  Juan Luis Smith MD-Fellow  Lisette Akbar PA-C-Assistant     PREOPERATIVE DIAGNOSIS:  Left shoulder   Tendinitis, Biceps M75.20 A-C Arthritis M12.9, Rotator Cuff Syndrome/Disorder  M75.100 and Tear, Rotator Cuff, Non-traumatic M75.100     POSTOPERATIVE DIAGNOSIS: Left shoulder   Tendinitis, Biceps M75.20 A-C Arthritis M12.9, Rotator Cuff Syndrome/Disorder  M75.100, Tear, Rotator Cuff, Non-traumatic M75.100 and Tendinitis, Biceps M75.20            PROCEDURES PERFORMED:  1. Left shoulder Arthroscopic rotator cuff repair CPT -  87541     2. Left shoulder Biceps tenodesis CPT - 36709     3. Left shoulder Arthroscopic distal clavicle excision CPT - 32981     4. Left shoulder Amniox Arthrocentesis, 39413      Handedness: right-handed  Sport played:    Level:            Pain   This is a chronic problem. The current episode started more than 1 month ago. The problem occurs intermittently. The problem has been rapidly worsening. Pertinent negatives include no chest pain, fever, joint swelling, numbness or rash. The symptoms are aggravated by exertion. The treatment provided no relief.       Review of Systems   Constitution: Negative for fever and night sweats.   HENT: Negative for hearing loss.    Eyes: Negative for blurred vision and visual disturbance.   Cardiovascular: Negative for chest pain and leg swelling.   Respiratory: Negative for shortness of breath.    Endocrine: Negative for polyuria.   Hematologic/Lymphatic: Negative for bleeding problem.   Skin: Negative for rash.   Musculoskeletal: Positive for joint pain. Negative for back pain, joint swelling, muscle cramps and muscle weakness.   Gastrointestinal: Negative for melena.   Genitourinary: Negative for hematuria.   Neurological: Negative for loss of balance, numbness and paresthesias.   Psychiatric/Behavioral: Negative for altered mental status.       Pain Related Questions  Over the past 3 days, what was your average pain during activity? (I.e. running, jogging, walking, climbing stairs, getting dressed, ect.): 0  Over the past 3 days, what was your highest pain level?: 0  Over the past 3 days, what was your lowest pain level? : 0    Other  How many nights a week are you awakened by your affected body part?: 0      Objective:        General: Lorraine Fonseca is well-developed, well-nourished, appears stated age, in no acute distress, alert and oriented to time, place and person.     General    Vitals reviewed.  Constitutional: She is oriented to person, place, and time. She appears  well-developed and well-nourished. No distress.   HENT:   Nose: Nose normal.   Mouth/Throat: No oropharyngeal exudate.   Eyes: Pupils are equal, round, and reactive to light. Right eye exhibits no discharge. Left eye exhibits no discharge.   Neck: Normal range of motion.   Cardiovascular: Normal rate and intact distal pulses.    Pulmonary/Chest: Effort normal and breath sounds normal. No respiratory distress.   Neurological: She is alert and oriented to person, place, and time. She has normal reflexes. She displays normal reflexes. No cranial nerve deficit. Coordination normal.   Psychiatric: She has a normal mood and affect. Her behavior is normal. Judgment and thought content normal.     General Musculoskeletal Exam   Gait: normal   Pelvic Obliquity: none      Right Knee Exam     Inspection   Alignment:  normal  Erythema: absent  Scars: absent  Swelling: absent  Effusion: effusion  Deformity: deformity  Bruising: absent    Tenderness   The patient is experiencing no tenderness.     Range of Motion   Extension: 0   Flexion: 140     Tests   Meniscus   Elina:  Medial - negative Lateral - negative  Ligament Examination Lachman: normal (-1 to 2mm) PCL-Posterior Drawer: normal (0 to 2mm)     MCL - Valgus: normal (0 to 2mm)  LCL - Varus: normalPivot Shift: normal (Equal)Reverse Pivot Shift: normal (Equal)Dial Test at 30 degrees: normal (< 5 degrees)Dial Test at 90 degrees: normal (< 5 degrees)  Posterior Sag Test: negative  Posterolateral Corner: unstable (>15 degrees difference)  Patella   Patellar apprehension: negative  Passive Patellar Tilt: neutral  Patellar Tracking: normal  Patellar Glide (quadrants): Lateral - 1   Medial - 2  Q-Angle at 90 degrees: normal  Patellar Grind: negative  J-Sign: none    Other   Meniscal Cyst: absent  Popliteal (Baker's) Cyst: absent  Sensation: normal    Left Knee Exam     Inspection   Alignment:  normal  Erythema: absent  Scars: absent  Swelling: absent  Effusion:  absent  Deformity: deformity  Bruising: absent    Tenderness   The patient is experiencing no tenderness.     Range of Motion   Extension: 0   Flexion: 140     Tests   Meniscus   Elina:  Medial - negative Lateral - negative  Stability Lachman: normal (-1 to 2mm) PCL-Posterior Drawer: normal (0 to 2mm)  MCL - Valgus: normal (0 to 2mm)  LCL - Varus: normal (0 to 2mm)Pivot Shift: normal (Equal)Reverse Pivot Shift: normal (Equal)Dial Test at 30 degrees: normal (< 5 degrees)Dial Test at 90 degrees: normal (< 5 degrees)  Posterior Sag Test: negative  Posterolateral Corner: unstable (>15 degrees difference)  Patella   Patellar apprehension: negative  Passive Patellar Tilt: neutral  Patellar Tracking: normal  Patellar Glide (Quadrants): Lateral - 1 Medial - 2  Q-Angle at 90 degrees: normal  Patellar Grind: negative  J-Sign: J sign absent    Other   Meniscal Cyst: absent  Popliteal (Baker's) Cyst: absent  Sensation: normal    Right Hip Exam     Inspection   Scars: absent  Swelling: absent  Bruising: absent  No deformity of hip.  Quadriceps Atrophy:  Negative  Erythema: absent    Range of Motion   Abduction:  40 normal   Adduction:  20 normal   Extension:  0 normal   Flexion:  110 normal   External rotation:  60 normal   Internal rotation:  15 normal     Tests   Pain w/ forced internal rotation (SAMIA): absent  Pain w/ forced external rotation (FADIR): absent  Alix: negative  Trendelenburg Test: negative  Circumduction test: negative  Stinchfield test: negative  Log Roll: negative  Snapping Hip (internal): negative  Step-down test: negative  Resisted sit-up pain: negative  Unresisted sit-up pain: negative  Resisted sit-up pain with adductor contraction pain: negative    Other   Sensation: decreased    Comments:  SLR + at 45 degrees  Left Hip Exam     Inspection   Scars: absent  Swelling: absent  No deformity of hip.  Quadriceps Atrophy:  negative  Erythema: absent  Bruising: absent    Range of Motion   Abduction:  40  normal   Adduction:  20 normal   Extension:  0 normal   Flexion:  110 normal   External rotation:  60 normal   Internal rotation: 15 normal     Tests   Pain w/ forced internal rotation (SAMIA): absent  Pain w/ forced external rotation (FADIR): absent  Alix: negative  Trendelenburg Test: negative  Circumduction test: negative  Stinchfield test: negative  Log Roll: negative  Snapping Hip (internal): negative  Step-down test: negative  Resisted sit-up pain: negative  Resisted sit-up pain with adductor contraction pain: negative  Unresisted sit-up pain: negative    Other   Sensation: decreased    Comments:  SLR + at 45 degrees      Back (L-Spine & T-Spine) / Neck (C-Spine) Exam   Back exam is normal.  Right Shoulder Exam     Inspection/Observation   Swelling: absent  Bruising: absent  Scars: absent  Deformity: absent  Scapular Winging: absent  Scapular Dyskinesia: negative  Atrophy: absent    Tenderness   The patient is experiencing no tenderness.    Range of Motion   Active abduction:  90 normal   Passive abduction:  100 normal   Extension:  0 normal   Forward Flexion:  180 normal   Forward Elevation: 180 normal  Adduction: 40 normal  External Rotation 0 degrees:  50 normal   External Rotation 90 degrees: 90 normal  Internal rotation 0 degrees:  T8 normal   Internal rotation 90 degrees:  30 normal     Tests & Signs   Apprehension: negative  Cross arm: negative  Drop arm: negative  Curiel test: negative  Impingement: negative  Sulcus: absent  Anterosuperior Escape: negative  Lag Sign 0 degrees: negative  Lag Sign 90 degrees: negative  Lift Off Sign: negative  Belly Press: negative  Active Compression Test ([place]'s Sign): negative  Yergason's Test: negative  Speed's Test: negative  Anterior Drawer Test: 1+   Posterior Drawer Test: 1+  Relocation 90 degrees: negative  Relocation > 90 degrees: negative  Bear Hug: negative  Moving Valgus: negative  Jerk Test: negative    Other   Sensation: normal    Left Shoulder Exam      Inspection/Observation   Swelling: absent  Bruising: absent  Scars: present  Deformity: absent  Scapular Winging: absent  Scapular Dyskinesia: negative  Atrophy: absent    Tenderness   The patient is tender to palpation of the greater tuberosity and biceps tendon.    Range of Motion   Active abduction:  90 abnormal   Passive abduction:  90 abnormal   Extension:  0 normal   Forward Flexion:  140 abnormal   Forward Elevation: 140 abnormal  Adduction: 40 normal  External Rotation 0 degrees:  40 abnormal   External Rotation 90 degrees: 20 abnormal  Internal rotation 0 degrees:  L1 abnormal   Internal rotation 90 degrees:  0 abnormal     Tests & Signs   Apprehension: negative  Cross arm: negative  Drop arm: negative  Curiel test: negative  Impingement: negative  Sulcus: absent  Rotator Cuff Painful Arc/Range: moderate  Anterosuperior Escape: negative  Lag Sign 0 degrees: negative  Lag Sign 90 degrees: negative  Lift Off Sign: negative  Belly Press: negative  Active Compression test (Louisa's Sign): negative  Yergasons's Test: negative  Speed's Test: negative  Anterior Drawer Test: 1+  Posterior Drawer Test: 1+  Relocation 90 degrees: negative  Relocation > 90 degrees: negative  Bear Hug: negative  Moving Valgus: negative  Jerk Test: negative    Other   Sensation: normal     Comments:  Incisions healed    No signs of worsened weakness of the rotator cuff muscles.       Muscle Strength   Right Upper Extremity   Shoulder Abduction: 5/5   Shoulder Internal Rotation: 5/5   Shoulder External Rotation: 5/5   Supraspinatus: 5/5/5   Subscapularis: 5/5/5   Biceps: 5/5/5   Left Upper Extremity  Shoulder Abduction: 5/5   Shoulder Internal Rotation: 5/5   Shoulder External Rotation: 4/5   Supraspinatus: 4/5/5   Subscapularis: 4/5/5   Biceps: 4/5/5   Right Lower Extremity   Hip Abduction: 5/5   Hip Adduction: 5/5   Hip Flexion: 5/5   Left Lower Extremity   Hip Abduction: 5/5   Hip Adduction: 5/5   Hip Flexion: 5/5     Reflexes      Left Side  Biceps:  2+  Triceps:  2+  Brachioradialis:  2+  Quadriceps:  2+  Achilles:  2+    Right Side   Biceps:  2+  Triceps:  2+  Brachioradialis:  2+  Quadriceps:  2+  Achilles:  2+    Vascular Exam     Right Pulses  Dorsalis Pedis:      2+  Posterior Tibial:      2+  Radial:                    2+      Left Pulses  Dorsalis Pedis:      2+  Posterior Tibial:      2+  Radial:                    2+      Capillary Refill  Right Hand: normal capillary refill  Left Hand: normal capillary refill    Edema  Right Upper Leg: absent  Left Upper Leg: absent    RADIOGRAPHS:  Left shoulder:  FINDINGS:  Postoperative changes identified.  Mild DJD.  No fracture or bone destruction        Assessment:       Encounter Diagnoses   Name Primary?    Left hip pain Yes    Rotator cuff syndrome of left shoulder     Chronic left shoulder pain     Biceps tendonitis on left     Decreased ROM of left shoulder     Rotator cuff syndrome, left           Plan:       Jeter Hip Score was filled out today in clinic.     RTC in 2 weeks with Izaiah Ramos PA-C for MRI results. Patient will not fill out Jeter Hip Score on return.    -MRI lumbar spine to eval for stenosis/ radiculopathy    -Referral started for lumbar HAYDEE pending MRI results    -WBAT LLE. No restrictions          [facility] patient questionnaires have been collected today.

## 2018-09-27 ENCOUNTER — TELEPHONE (OUTPATIENT)
Dept: SPORTS MEDICINE | Facility: CLINIC | Age: 53
End: 2018-09-27

## 2018-09-27 NOTE — TELEPHONE ENCOUNTER
Spoke to the patient and explained that we cannot provide pain medication. Recommended she see her PCP or we can place a referral to pain management. Patient was not interested in doing either. She has a follow up with Dr. Vann on 10/3.        ----- Message from Kiya Cohen sent at 9/27/2018 10:34 AM CDT -----  Contact: Self  Patient states her pain is very bad and she wants to know if she can be prescribed pain medication to help with the hip pain.  Requesting a call back at [phone]

## 2018-09-28 ENCOUNTER — HOSPITAL ENCOUNTER (EMERGENCY)
Facility: HOSPITAL | Age: 53
Discharge: HOME | End: 2018-09-28
Attending: EMERGENCY MEDICINE
Payer: MEDICAID

## 2018-09-28 VITALS
DIASTOLIC BLOOD PRESSURE: 72 MMHG | SYSTOLIC BLOOD PRESSURE: 126 MMHG | HEIGHT: 67 IN | BODY MASS INDEX: 32.96 KG/M2 | TEMPERATURE: 98 F | WEIGHT: 210 LBS | RESPIRATION RATE: 18 BRPM | HEART RATE: 88 BPM | OXYGEN SATURATION: 98 %

## 2018-09-28 DIAGNOSIS — M25.552 PAIN OF LEFT HIP JOINT: Primary | ICD-10-CM

## 2018-09-28 LAB
BILIRUB UR QL STRIP: NEGATIVE
CLARITY UR: CLEAR
COLOR UR: NORMAL
GLUCOSE UR QL STRIP: NEGATIVE
HGB UR QL STRIP: NEGATIVE
KETONES UR QL STRIP: NEGATIVE
LEUKOCYTE ESTERASE UR QL STRIP: NEGATIVE
NITRITE UR QL STRIP: NEGATIVE
PH UR STRIP: 6 [PH] (ref 5–8)
PROT UR QL STRIP: NEGATIVE
SP GR UR STRIP: 1 (ref 1–1.03)
URN SPEC COLLECT METH UR: NORMAL
UROBILINOGEN UR STRIP-ACNC: NEGATIVE EU/DL

## 2018-09-28 PROCEDURE — 81003 URINALYSIS AUTO W/O SCOPE: CPT

## 2018-09-28 PROCEDURE — 96372 THER/PROPH/DIAG INJ SC/IM: CPT

## 2018-09-28 PROCEDURE — 99283 EMERGENCY DEPT VISIT LOW MDM: CPT | Mod: 25

## 2018-09-28 PROCEDURE — 63600175 PHARM REV CODE 636 W HCPCS: Performed by: NURSE PRACTITIONER

## 2018-09-28 RX ORDER — DEXAMETHASONE SODIUM PHOSPHATE 4 MG/ML
12 INJECTION, SOLUTION INTRA-ARTICULAR; INTRALESIONAL; INTRAMUSCULAR; INTRAVENOUS; SOFT TISSUE
Status: COMPLETED | OUTPATIENT
Start: 2018-09-28 | End: 2018-09-28

## 2018-09-28 RX ADMIN — DEXAMETHASONE SODIUM PHOSPHATE 12 MG: 4 INJECTION, SOLUTION INTRAMUSCULAR; INTRAVENOUS at 12:09

## 2018-09-28 NOTE — DISCHARGE INSTRUCTIONS
Keep appointment for MRI on Monday    Please return to the Emergency Department for any new or worsening symptoms including: fever, chest pain, shortness of breath, loss of consciousness, dizziness, weakness, or any other concerns.     Please follow up with your Primary Care Provider within in the week. If you do not have one, you may contact the one listed on your discharge paperwork or you may also call the [facility] Appointment Desk at [phone] to schedule an appointment with one.

## 2018-09-28 NOTE — ED TRIAGE NOTES
Patient reports worsening pain to left hip that started on yesterday. Reports MRI scheduled for this issue on Monday, but pain is unbearable and causing N/V.

## 2018-09-28 NOTE — ED PROVIDER NOTES
"Encounter Date: 2018  52 y.o. female with left lower back pain radiating to leg, with nausea. Patient will be seen by another provider for further evaluation when an exam room is available. Aditya BRADEN, 11:40 AM       History   No chief complaint on file.    52-year-old female with no pertinent medical history presents to the emergency department for left flank and hip pain associated with vomiting x 1 1/2 months. Patient has been seen by PCP, Dr. Vann, for intermittent hip pain. Pain does not radiate. Pain worsen when lying flat and sitting up. Slight relief noted with cool compresses to affected area. Patient rate pain 8/10 on pain scale. The patient reports taking and Norflex, Percocet, and Tylenol with no relief.  The patient denies any fever, chills, chest pain, shortness of breath, abdominal pain, dysuria, bowel or bladder incontinence, vaginal bleeding or discharge, headache and dizziness.      The history is provided by the patient.     Review of patient's allergies indicates:   Allergen Reactions    Percocet [oxycodone-acetaminophen] Nausea Only     Can take Percocet if she takes Phenergan or Zofran.    Vicodin [hydrocodone-acetaminophen]      "Makes me Hyper." Doesn't help the pain.    Zofran [ondansetron hcl] Nausea And Vomiting     Past Medical History:   Diagnosis Date    Anxiety     Back problem     Diabetes mellitus type I     no meds since weight loss few years back    General anesthetics causing adverse effect in therapeutic use     sometimes hard to sedate--    Pneumonia     Thyroid disease     thyroid nodules     Past Surgical History:   Procedure Laterality Date    ARTHROSCOPY-SHOULDER Left 2018    [facility]formed [facility] at [facility] OR 1ST FLR    ARTHROSCOPY-SHOULDER EXCISION DISTAL CLAVICLE Left 2018    Performed by [facility] at [facility] OR 1ST FLR     SECTION  x2     and     CYSTOSCOPY WITH HYDRODISTENSION AND BLADDER INSTILLATION  " 1/3/2017    [facility] OR    DEBRIDEMENT-SHOULDER-ARTHROSCOPIC Left 4/20/2018    [facility] OR 1ST FLR    HYSTERECTOMY      Partial abd    INJECTION-STEROID; LEFT shoulder Left 4/20/2018    Performed by [facility] at [facility] OR 1ST FLR    PYELOGRAM-RETROGRADE Bilateral 1/3/2017    [facility] at [facility] OR    REPAIR-TENDON-BICEP Left 4/20/2018    Performed by [facility] at [facility] OR 1ST FLR    RHINOPLASTY TIP      SHOULDER SURGERY       Family History   Problem Relation Age of Onset    Diabetes Mother     Heart disease Mother     Hyperlipidemia Mother     Hypertension Mother      Social History     Tobacco Use    Smoking status: Current Every Day Smoker     Packs/day: 1.00     Types: Cigarettes    Smokeless tobacco: Never Used   Substance Use Topics    Alcohol use: No    Drug use: No     Review of Systems   Constitutional: Negative for chills, diaphoresis, fatigue and fever.   Eyes: Negative for pain and redness.   Respiratory: Negative for chest tightness and shortness of breath.    Cardiovascular: Negative for chest pain.   Gastrointestinal: Negative for abdominal pain, constipation, diarrhea, nausea and vomiting.   Genitourinary: Positive for flank pain. Negative for decreased urine volume, dysuria, frequency, hematuria, pelvic pain, urgency, vaginal bleeding, vaginal discharge and vaginal pain.   Musculoskeletal: Positive for arthralgias. Negative for gait problem, joint swelling, myalgias, neck pain and neck stiffness.   Neurological: Negative for dizziness, tremors, weakness, light-headedness, numbness and headaches.   Psychiatric/Behavioral: Negative for agitation, behavioral problems, confusion and decreased concentration.       Physical Exam     Initial Vitals   BP Pulse Resp Temp SpO2   -- -- -- -- --      MAP       --         Physical Exam    Nursing note and vitals reviewed.  Constitutional:  Vital signs are normal. She appears well-developed and well-nourished. She is cooperative.  Non-toxic appearance.   HENT:   Head: Normocephalic and atraumatic.   Right Ear: Hearing normal.   Left Ear: Hearing normal.   Nose: Nose normal.   Mouth/Throat: Uvula is midline, oropharynx is clear and moist and mucous membranes are normal.   Eyes: Conjunctivae and EOM are normal. Pupils are equal, round, and reactive to light.   Neck: Full passive range of motion without pain. Neck supple. No JVD present.   Cardiovascular: Normal rate, regular rhythm, normal heart sounds and intact distal pulses.   Pulses:       Carotid pulses are 2+ on the right side, and 2+ on the left side.       Radial pulses are 2+ on the right side, and 2+ on the left side.        Femoral pulses are 2+ on the right side, and 2+ on the left side.  Pulmonary/Chest: Effort normal and breath sounds normal.   Abdominal: Soft. Normal appearance and bowel sounds are normal.   Musculoskeletal:        Left hip: She exhibits tenderness. She exhibits normal range of motion, normal strength, no bony tenderness, no swelling, no crepitus, no deformity and no laceration.   Full ROM with complaint of pain.    Neurological: She is alert and oriented to person, place, and time. She has normal strength. No sensory deficit. Gait normal.   Skin: Skin is warm. Capillary refill takes less than 2 seconds. No rash noted.   Psychiatric: She has a normal mood and affect. Her speech is normal and behavior is normal. Judgment and thought content normal.         ED Course   Procedures  Labs Reviewed - No data to display       Imaging Results    None          Medical Decision Making:   History:   Old Medical Records: I decided to obtain old medical records.  Initial Assessment:   This is an emergent evaluation of a 52 y.o. female with no PMHx presenting to the ED for intermittent left hip pain and left flank pain associated with vomiting. Vitals normal. Patient ambulatory without  a limp. No surface trauma, ecchymosis. No erthyema, warmth noted. Denies any dysuria, fever, or SOB. Patient is non-toxic appearing and in no acute distress. No deformity, crepitus, or obvious asymmetry of the affected a compared to other.  No inguinal lymphadenopathy. Pain with abduction/adduction and internal/external rotation of left hip. -SLR. No midline tenderness of cervical, thoracic, or lumbar spine with palpation. Distal motor and neurovascular status are intact.    Previous chart review. X ray of bilateral hips showed no acute abnormalities and degenerative changes.  UA show no UTI  Differential Diagnosis:   Hip Fracture, Dislocation, Muscle Strain, UTI, Arthritis, Degenerative lumbar disc disease, Septic joint, amongst others.    ED Management:  Patient pain is no different than previous hip pain. Patient was given Decadron 12mg IM during ED stay. Patient advised to keep MRI appointment for Monday scheduled by PCP. Patient stable for discharge. Patient ambulatory without difficulty. Patient drove to the ED. Instructed to follow up with PCP for reevaluation and management of symptoms.   I discussed with the patient the diagnosis, treatment plan, indications for return to the emergency department, and for expected follow-up. The patient verbalized an understanding. The patient is asked if there are any questions or concerns. We discuss the case, until all issues are addressed to the patient's satisfaction. Patient understands and is agreeable to the plan.     I discussed this patient with  who is in agreement with my assessment and plan.       Lissette Price NP                               Clinical Impression:   The encounter diagnosis was Pain of left hip joint.      Disposition:   Disposition: Discharged  Condition: Stable                        Lissette Price NP  09/28/18 2677

## 2018-09-29 DIAGNOSIS — M19.012 ARTHRITIS OF LEFT ACROMIOCLAVICULAR JOINT: ICD-10-CM

## 2018-09-29 DIAGNOSIS — M75.102 ROTATOR CUFF SYNDROME, LEFT: ICD-10-CM

## 2018-09-29 DIAGNOSIS — M75.122 COMPLETE TEAR OF LEFT ROTATOR CUFF: ICD-10-CM

## 2018-09-29 DIAGNOSIS — M75.22 BICEPS TENDONITIS ON LEFT: ICD-10-CM

## 2018-10-01 ENCOUNTER — HOSPITAL ENCOUNTER (OUTPATIENT)
Dept: RADIOLOGY | Facility: HOSPITAL | Age: 53
Discharge: HOME | End: 2018-10-01
Attending: ORTHOPAEDIC SURGERY
Payer: MEDICAID

## 2018-10-01 DIAGNOSIS — M54.16 LUMBAR RADICULOPATHY, ACUTE: ICD-10-CM

## 2018-10-01 DIAGNOSIS — M25.552 LEFT HIP PAIN: ICD-10-CM

## 2018-10-01 DIAGNOSIS — Z12.39 SCREENING FOR BREAST CANCER: Primary | ICD-10-CM

## 2018-10-01 PROCEDURE — 72148 MRI LUMBAR SPINE W/O DYE: CPT | Mod: TC

## 2018-10-01 PROCEDURE — 72148 MRI LUMBAR SPINE W/O DYE: CPT | Mod: 26,,, | Performed by: RADIOLOGY

## 2018-10-02 ENCOUNTER — TELEPHONE (OUTPATIENT)
Dept: PAIN MEDICINE | Facility: CLINIC | Age: 53
End: 2018-10-02

## 2018-10-02 ENCOUNTER — TELEPHONE (OUTPATIENT)
Dept: ADMINISTRATIVE | Facility: HOSPITAL | Age: 53
End: 2018-10-02

## 2018-10-02 DIAGNOSIS — M54.16 LUMBAR RADICULOPATHY: Primary | ICD-10-CM

## 2018-10-02 RX ORDER — PROMETHAZINE HYDROCHLORIDE 25 MG/1
TABLET ORAL
Qty: 45 TABLET | Refills: 0 | Status: ON HOLD | OUTPATIENT
Start: 2018-10-02 | End: 2018-10-29 | Stop reason: CLARIF

## 2018-10-02 NOTE — TELEPHONE ENCOUNTER
Case request order entered. Can you please contact patient to schedule procedure. Thank you.      Message   Received: Today   Message Contents   MD Susanna Cornejo MD; MERLINE MURPHY Staff; Pam Addison RN   Cc: Harrison Ramos PA-C; Lisette Akbar PA-C; MERLINE Caal Staff             Will do Thank you      Previous Messages      ----- Message -----   From: Susanna Vann MD   Sent: 10/2/2018   6:59 AM   To: Ten Thompson MD, Lisette Akbar PA-C, *     Have her come back to see PA for result review and set up for neuroforaminal injection at Right L3-4 by Ten Thompson

## 2018-10-02 NOTE — TELEPHONE ENCOUNTER
----- Message from Ten Thompson MD sent at 10/2/2018  7:54 AM CDT -----  Will do Thank you  ----- Message -----  From: Susanna Vann MD  Sent: 10/2/2018   6:59 AM  To: Ten Thompson MD, Lisette Akbar, PAWalkerC, #    Have her come back to see PA for result review and set up for neuroforaminal injection at Right L3-4 by Ten Thompson

## 2018-10-03 ENCOUNTER — OFFICE VISIT (OUTPATIENT)
Dept: SPORTS MEDICINE | Facility: CLINIC | Age: 53
End: 2018-10-03
Payer: MEDICAID

## 2018-10-03 VITALS
SYSTOLIC BLOOD PRESSURE: 117 MMHG | WEIGHT: 210 LBS | DIASTOLIC BLOOD PRESSURE: 80 MMHG | BODY MASS INDEX: 32.96 KG/M2 | HEART RATE: 81 BPM | HEIGHT: 67 IN

## 2018-10-03 DIAGNOSIS — G89.29 CHRONIC LEFT SHOULDER PAIN: ICD-10-CM

## 2018-10-03 DIAGNOSIS — M19.012 ARTHRITIS OF LEFT ACROMIOCLAVICULAR JOINT: ICD-10-CM

## 2018-10-03 DIAGNOSIS — M25.612 DECREASED ROM OF LEFT SHOULDER: ICD-10-CM

## 2018-10-03 DIAGNOSIS — M54.2 NECK PAIN: ICD-10-CM

## 2018-10-03 DIAGNOSIS — M75.102 ROTATOR CUFF SYNDROME, LEFT: ICD-10-CM

## 2018-10-03 DIAGNOSIS — M75.122 COMPLETE TEAR OF LEFT ROTATOR CUFF: Primary | ICD-10-CM

## 2018-10-03 DIAGNOSIS — M25.512 CHRONIC LEFT SHOULDER PAIN: ICD-10-CM

## 2018-10-03 PROCEDURE — 99213 OFFICE O/P EST LOW 20 MIN: CPT | Mod: PBBFAC,PO | Performed by: ORTHOPAEDIC SURGERY

## 2018-10-03 PROCEDURE — 99214 OFFICE O/P EST MOD 30 MIN: CPT | Mod: S$PBB,,, | Performed by: ORTHOPAEDIC SURGERY

## 2018-10-03 PROCEDURE — 99999 PR PBB SHADOW E&M-EST. PATIENT-LVL III: CPT | Mod: PBBFAC,,, | Performed by: ORTHOPAEDIC SURGERY

## 2018-10-03 NOTE — PROGRESS NOTES
Subjective:          Chief Complaint: Lorraine Childers is a 52 y.o. female who had concerns including Pain of the Lower Back.    Here today for eval of L hip and lumbar pain with radiculopathy to LLE. + numbness to thighs bilaterally. States she has had several episodes of acute back/buttock/lateral thigh pain on the L. Have been so severe she has N/v. Seen in ER x2 and CT abdomen performed which showed no intra-abdominal abnormality. Referred to ortho for management.    As far as her L shoulder she is doing well.  Patient is here for 4 month unplanned post op left shoulder. She was doing PT at [facility] and doing well until she was unable to get into PT starting 2 weeks ago due to availability issues. She has not done PT in over 2 weeks and reports that that is when her shoulder began hurting worse. She is RHD. She was doing well prior to this. Pain is mostly located of the anterior shoulder. She is taking celebrex with no pain relief.  Pain today is 7/10 today. She has been taking 1/2 percocet 10mg before bed time as needed for pain and icing her shoulder. Shoulder hurts to lay on it. She has missed PT for her left shoulder duet to a scheduling issue. She is going to try to set up PT at [facility].    She denies any new injuries, trauma, or falls.     DATE OF PROCEDURE: 4/20/2018     ATTENDING SURGEON: Surgeon(s) and Role:     * Susanan Vann MD - Primary  Assistants:  Juan Luis Smith MD-Fellow  Lisette Akbar PA-C-Assistant     PREOPERATIVE DIAGNOSIS:  Left shoulder   Tendinitis, Biceps M75.20 A-C Arthritis M12.9, Rotator Cuff Syndrome/Disorder  M75.100 and Tear, Rotator Cuff, Non-traumatic M75.100     POSTOPERATIVE DIAGNOSIS: Left shoulder   Tendinitis, Biceps M75.20 A-C Arthritis M12.9, Rotator Cuff Syndrome/Disorder  M75.100, Tear, Rotator Cuff, Non-traumatic M75.100 and Tendinitis, Biceps M75.20            PROCEDURES PERFORMED:  1. Left shoulder Arthroscopic rotator cuff repair CPT -  69753     2. Left shoulder Biceps tenodesis CPT - 58716     3. Left shoulder Arthroscopic distal clavicle excision CPT - 75022     4. Left shoulder Amniox Arthrocentesis, 12122      Handedness: right-handed  Sport played:    Level:            Pain   This is a chronic problem. The current episode started more than 1 month ago. The problem occurs intermittently. The problem has been rapidly worsening. Pertinent negatives include no chest pain, fever, joint swelling, numbness or rash. The symptoms are aggravated by exertion. The treatment provided no relief.       Review of Systems   Constitution: Negative for fever and night sweats.   HENT: Negative for hearing loss.    Eyes: Negative for blurred vision and visual disturbance.   Cardiovascular: Negative for chest pain and leg swelling.   Respiratory: Negative for shortness of breath.    Endocrine: Negative for polyuria.   Hematologic/Lymphatic: Negative for bleeding problem.   Skin: Negative for rash.   Musculoskeletal: Positive for joint pain. Negative for back pain, joint swelling, muscle cramps and muscle weakness.   Gastrointestinal: Negative for melena.   Genitourinary: Negative for hematuria.   Neurological: Negative for loss of balance, numbness and paresthesias.   Psychiatric/Behavioral: Negative for altered mental status.       Pain Related Questions  Over the past 3 days, what was your average pain during activity? (I.e. running, jogging, walking, climbing stairs, getting dressed, ect.): 5  Over the past 3 days, what was your highest pain level?: 4  Over the past 3 days, what was your lowest pain level? : 0    Other  How many nights a week are you awakened by your affected body part?: 3  Was the patient's HEIGHT measured or patient reported?: Patient Reported  Was the patient's WEIGHT measured or patient reported?: Measured      Objective:        General: Lorraine Fonseca is well-developed, well-nourished, appears stated age, in no acute distress, alert and  oriented to time, place and person.     General    Vitals reviewed.  Constitutional: She is oriented to person, place, and time. She appears well-developed and well-nourished. No distress.   HENT:   Nose: Nose normal.   Mouth/Throat: No oropharyngeal exudate.   Eyes: Pupils are equal, round, and reactive to light. Right eye exhibits no discharge. Left eye exhibits no discharge.   Neck: Normal range of motion.   Cardiovascular: Normal rate and intact distal pulses.    Pulmonary/Chest: Effort normal and breath sounds normal. No respiratory distress.   Neurological: She is alert and oriented to person, place, and time. She has normal reflexes. She displays normal reflexes. No cranial nerve deficit. Coordination normal.   Psychiatric: She has a normal mood and affect. Her behavior is normal. Judgment and thought content normal.     General Musculoskeletal Exam   Gait: normal   Pelvic Obliquity: none      Right Knee Exam     Inspection   Alignment:  normal  Erythema: absent  Scars: absent  Swelling: absent  Effusion: absent  Deformity: absent  Bruising: absent    Tenderness   The patient is experiencing no tenderness.     Range of Motion   Extension: 0   Flexion: 140     Tests   Meniscus   Elina:  Medial - negative Lateral - negative  Ligament Examination Lachman: normal (-1 to 2mm) PCL-Posterior Drawer: normal (0 to 2mm)     MCL - Valgus: normal (0 to 2mm)  LCL - Varus: normalPivot Shift: normal (Equal)Reverse Pivot Shift: normal (Equal)Dial Test at 30 degrees: normal (< 5 degrees)Dial Test at 90 degrees: normal (< 5 degrees)  Posterior Sag Test: negative  Posterolateral Corner: unstable (>15 degrees difference)  Patella   Patellar apprehension: negative  Passive Patellar Tilt: neutral  Patellar Tracking: normal  Patellar Glide (quadrants): Lateral - 1   Medial - 2  Q-Angle at 90 degrees: normal  Patellar Grind: negative  J-Sign: none    Other   Meniscal Cyst: absent  Popliteal (Baker's) Cyst: absent  Sensation:  normal    Left Knee Exam     Inspection   Alignment:  normal  Erythema: absent  Scars: absent  Swelling: absent  Effusion: absent  Deformity: absent  Bruising: absent    Tenderness   The patient is experiencing no tenderness.     Range of Motion   Extension: 0   Flexion: 140     Tests   Meniscus   Elina:  Medial - negative Lateral - negative  Stability Lachman: normal (-1 to 2mm) PCL-Posterior Drawer: normal (0 to 2mm)  MCL - Valgus: normal (0 to 2mm)  LCL - Varus: normal (0 to 2mm)Pivot Shift: normal (Equal)Reverse Pivot Shift: normal (Equal)Dial Test at 30 degrees: normal (< 5 degrees)Dial Test at 90 degrees: normal (< 5 degrees)  Posterior Sag Test: negative  Posterolateral Corner: unstable (>15 degrees difference)  Patella   Patellar apprehension: negative  Passive Patellar Tilt: neutral  Patellar Tracking: normal  Patellar Glide (Quadrants): Lateral - 1 Medial - 2  Q-Angle at 90 degrees: normal  Patellar Grind: negative  J-Sign: J sign absent    Other   Meniscal Cyst: absent  Popliteal (Baker's) Cyst: absent  Sensation: normal    Right Hip Exam     Inspection   Scars: absent  Swelling: absent  Bruising: absent  No deformity of hip.  Quadriceps Atrophy:  Negative  Erythema: absent    Range of Motion   Abduction:  40 normal   Adduction:  20 normal   Extension:  0 normal   Flexion:  110 normal   External rotation:  60 normal   Internal rotation:  15 normal     Tests   Pain w/ forced internal rotation (SAMIA): absent  Pain w/ forced external rotation (FADIR): absent  Alix: negative  Trendelenburg Test: negative  Circumduction test: negative  Stinchfield test: negative  Log Roll: negative  Snapping Hip (internal): negative  Step-down test: negative  Resisted sit-up pain: negative  Unresisted sit-up pain: negative  Resisted sit-up pain with adductor contraction pain: negative    Other   Sensation: decreased    Comments:  SLR + at 45 degrees  Left Hip Exam     Inspection   Scars: absent  Swelling: absent  No  deformity of hip.  Quadriceps Atrophy:  negative  Erythema: absent  Bruising: absent    Range of Motion   Abduction:  40 normal   Adduction:  20 normal   Extension:  0 normal   Flexion:  110 normal   External rotation:  60 normal   Internal rotation: 15 normal     Tests   Pain w/ forced internal rotation (SAMIA): absent  Pain w/ forced external rotation (FADIR): absent  Alix: negative  Trendelenburg Test: negative  Circumduction test: negative  Stinchfield test: negative  Log Roll: negative  Snapping Hip (internal): negative  Step-down test: negative  Resisted sit-up pain: negative  Resisted sit-up pain with adductor contraction pain: negative  Unresisted sit-up pain: negative    Other   Sensation: decreased    Comments:  SLR + at 45 degrees      Back (L-Spine & T-Spine) / Neck (C-Spine) Exam   Back exam is normal.  Right Shoulder Exam     Inspection/Observation   Swelling: absent  Bruising: absent  Scars: absent  Deformity: absent  Scapular Winging: absent  Scapular Dyskinesia: negative  Atrophy: absent    Tenderness   The patient is experiencing no tenderness.    Range of Motion   Active abduction:  90 normal   Passive abduction:  100 normal   Extension:  0 normal   Forward Flexion:  180 normal   Forward Elevation: 180 normal  Adduction: 40 normal  External Rotation 0 degrees:  50 normal   External Rotation 90 degrees: 90 normal  Internal rotation 0 degrees:  T8 normal   Internal rotation 90 degrees:  30 normal     Tests & Signs   Apprehension: negative  Cross arm: negative  Drop arm: negative  Curiel test: negative  Impingement: negative  Sulcus: absent  Anterosuperior Escape: negative  Lag Sign 0 degrees: negative  Lag Sign 90 degrees: negative  Lift Off Sign: negative  Belly Press: negative  Active Compression Test (Wolfe's Sign): negative  Yergason's Test: negative  Speed's Test: negative  Anterior Drawer Test: 1+   Posterior Drawer Test: 1+  Relocation 90 degrees: negative  Relocation > 90 degrees:  negative  Bear Hug: negative  Moving Valgus: negative  Jerk Test: negative    Other   Sensation: normal    Left Shoulder Exam     Inspection/Observation   Swelling: absent  Bruising: absent  Scars: present  Deformity: absent  Scapular Winging: absent  Scapular Dyskinesia: negative  Atrophy: absent    Tenderness   The patient is tender to palpation of the greater tuberosity and biceps tendon.    Range of Motion   Active abduction:  90 abnormal   Passive abduction:  90 abnormal   Extension:  0 normal   Forward Flexion:  140 abnormal   Forward Elevation: 140 abnormal  Adduction: 40 normal  External Rotation 0 degrees:  40 abnormal   External Rotation 90 degrees: 20 abnormal  Internal rotation 0 degrees:  L1 abnormal   Internal rotation 90 degrees:  0 abnormal     Tests & Signs   Apprehension: negative  Cross arm: negative  Drop arm: negative  Curiel test: negative  Impingement: negative  Sulcus: absent  Rotator Cuff Painful Arc/Range: moderate  Anterosuperior Escape: negative  Lag Sign 0 degrees: negative  Lag Sign 90 degrees: negative  Lift Off Sign: negative  Belly Press: negative  Active Compression test (Kenvil's Sign): negative  Yergasons's Test: negative  Speed's Test: negative  Anterior Drawer Test: 1+  Posterior Drawer Test: 1+  Relocation 90 degrees: negative  Relocation > 90 degrees: negative  Bear Hug: negative  Moving Valgus: negative  Jerk Test: negative    Other   Sensation: normal     Comments:  Incisions healed    No signs of worsened weakness of the rotator cuff muscles.       Muscle Strength   Right Upper Extremity   Shoulder Abduction: 5/5   Shoulder Internal Rotation: 5/5   Shoulder External Rotation: 5/5   Supraspinatus: 5/5/5   Subscapularis: 5/5/5   Biceps: 5/5/5   Left Upper Extremity  Shoulder Abduction: 5/5   Shoulder Internal Rotation: 5/5   Shoulder External Rotation: 4/5   Supraspinatus: 4/5/5   Subscapularis: 4/5/5   Biceps: 4/5/5   Right Lower Extremity   Hip Abduction: 5/5   Hip  Adduction: 5/5   Hip Flexion: 5/5   Left Lower Extremity   Hip Abduction: 5/5   Hip Adduction: 5/5   Hip Flexion: 5/5     Reflexes     Left Side  Biceps:  2+  Triceps:  2+  Brachioradialis:  2+  Quadriceps:  2+  Achilles:  2+    Right Side   Biceps:  2+  Triceps:  2+  Brachioradialis:  2+  Quadriceps:  2+  Achilles:  2+    Vascular Exam     Right Pulses  Dorsalis Pedis:      2+  Posterior Tibial:      2+  Radial:                    2+      Left Pulses  Dorsalis Pedis:      2+  Posterior Tibial:      2+  Radial:                    2+      Capillary Refill  Right Hand: normal capillary refill  Left Hand: normal capillary refill    Edema  Right Upper Leg: absent  Left Upper Leg: absent    RADIOGRAPHS:  Left shoulder:  FINDINGS:  Postoperative changes identified.  Mild DJD.  No fracture or bone destruction        Assessment:       Encounter Diagnoses   Name Primary?    Complete tear of left rotator cuff Yes    Arthritis of left acromioclavicular joint     Rotator cuff syndrome, left     Decreased ROM of left shoulder     Chronic left shoulder pain     Neck pain           Plan:       Jeter Hip Score was filled out today in clinic.     RTC in 2 weeks with Izaiah Ramos PA-C for MRI results. Patient will not fill out Jeter Hip Score on return.    -MRI lumbar spine to eval for stenosis/ radiculopathy    -Referral started for lumbar HAYDEE pending MRI results    -WBAT LLE. No restrictions          [facility] patient questionnaires have been collected today.

## 2018-10-04 ENCOUNTER — PATIENT MESSAGE (OUTPATIENT)
Dept: SPORTS MEDICINE | Facility: CLINIC | Age: 53
End: 2018-10-04

## 2018-10-04 ENCOUNTER — TELEPHONE (OUTPATIENT)
Dept: SPORTS MEDICINE | Facility: CLINIC | Age: 53
End: 2018-10-04

## 2018-10-04 NOTE — TELEPHONE ENCOUNTER
Spoke to the patient and provided MRI results and the information for Dr. Thompson office.      ----- Message from Cara Marie MA sent at 10/3/2018  6:47 PM CDT -----  Contact: patient      ----- Message -----  From: Anthony Haji  Sent: 10/3/2018   2:37 PM  To: Edwar MURPHY Staff    Please call pt at [phone]. Patient is calling requesting her MRI scan results stat because she was not able to see Dr Vann in clinic today    Thank you

## 2018-10-10 ENCOUNTER — TELEPHONE (OUTPATIENT)
Dept: PULMONOLOGY | Facility: CLINIC | Age: 53
End: 2018-10-10

## 2018-10-10 DIAGNOSIS — R05.9 COUGH: Primary | ICD-10-CM

## 2018-10-11 ENCOUNTER — TELEPHONE (OUTPATIENT)
Dept: SPORTS MEDICINE | Facility: CLINIC | Age: 53
End: 2018-10-11

## 2018-10-11 NOTE — TELEPHONE ENCOUNTER
----- Message from Aliza Sharma sent at 10/11/2018  2:30 PM CDT -----  Contact: self  Pt called requesting a return call back regarding information about the spine doctor that she was suppose to see. Referred by Dr. Vann. Pt could be reached at [phone]

## 2018-10-11 NOTE — TELEPHONE ENCOUNTER
Spoke with pt and gave her the name of [facility] back specialist- Dr. Garland Cannon. I provided her with the name and number for his clinic so she can make an appt with him per Dr. Vann recommendation.

## 2018-10-17 ENCOUNTER — OFFICE VISIT (OUTPATIENT)
Dept: PULMONOLOGY | Facility: CLINIC | Age: 53
End: 2018-10-17
Payer: MEDICAID

## 2018-10-17 ENCOUNTER — HOSPITAL ENCOUNTER (OUTPATIENT)
Dept: PULMONOLOGY | Facility: CLINIC | Age: 53
Discharge: HOME | End: 2018-10-17
Payer: MEDICAID

## 2018-10-17 VITALS
HEIGHT: 67 IN | OXYGEN SATURATION: 99 % | WEIGHT: 213 LBS | SYSTOLIC BLOOD PRESSURE: 120 MMHG | HEART RATE: 76 BPM | DIASTOLIC BLOOD PRESSURE: 78 MMHG | BODY MASS INDEX: 33.43 KG/M2

## 2018-10-17 DIAGNOSIS — R05.9 COUGH: ICD-10-CM

## 2018-10-17 DIAGNOSIS — F17.200 SMOKER: Primary | ICD-10-CM

## 2018-10-17 DIAGNOSIS — Z72.0 TOBACCO USER: ICD-10-CM

## 2018-10-17 DIAGNOSIS — R91.1 PULMONARY NODULE: ICD-10-CM

## 2018-10-17 DIAGNOSIS — R91.1 LUNG NODULE: ICD-10-CM

## 2018-10-17 LAB
PRE FEV1 FVC: 80
PRE FEV1: 3.11
PRE FVC: 3.9
PREDICTED FEV1 FVC: 80
PREDICTED FEV1: 2.81
PREDICTED FVC: 3.48

## 2018-10-17 PROCEDURE — 99204 OFFICE O/P NEW MOD 45 MIN: CPT | Mod: 25,S$PBB,, | Performed by: INTERNAL MEDICINE

## 2018-10-17 PROCEDURE — 99406 BEHAV CHNG SMOKING 3-10 MIN: CPT | Mod: S$PBB,,, | Performed by: INTERNAL MEDICINE

## 2018-10-17 PROCEDURE — 99999 PR PBB SHADOW E&M-EST. PATIENT-LVL III: CPT | Mod: PBBFAC,,, | Performed by: INTERNAL MEDICINE

## 2018-10-17 PROCEDURE — 94010 BREATHING CAPACITY TEST: CPT | Mod: 26,S$PBB,, | Performed by: INTERNAL MEDICINE

## 2018-10-17 PROCEDURE — 94010 BREATHING CAPACITY TEST: CPT | Mod: PBBFAC | Performed by: INTERNAL MEDICINE

## 2018-10-17 PROCEDURE — 94729 DIFFUSING CAPACITY: CPT | Mod: PBBFAC | Performed by: INTERNAL MEDICINE

## 2018-10-17 PROCEDURE — 99213 OFFICE O/P EST LOW 20 MIN: CPT | Mod: PBBFAC | Performed by: INTERNAL MEDICINE

## 2018-10-17 PROCEDURE — 94729 DIFFUSING CAPACITY: CPT | Mod: 26,S$PBB,, | Performed by: INTERNAL MEDICINE

## 2018-10-17 NOTE — LETTER
October 17, 2018      Jose Felix, NP  [address]  [place] [address]           [facility] Pulmonary Services  [address]  [facility] [address]  Phone: [phone]          Patient: Lorraine Childers   MR Number: [id number]   YOB: 1965   Date of Visit: 10/17/2018       Dear Jose Felix:    Thank you for referring Lorraine Childers to me for evaluation. Attached you will find relevant portions of my assessment and plan of care.    If you have questions, please do not hesitate to call me. I look forward to following Lorraine Childers along with you.    Sincerely,    Keisha Solorzano MD    Enclosure  CC:  No Recipients    If you would like to receive this communication electronically, please contact [email or web address] or [phone] to request more information on [employer] Link access.    For providers and/or their staff who would like to refer a patient to [facility], please contact us through our one-stop-shop provider referral line, [facility], at [phone].    If you feel you have received this communication in error or would no longer like to receive these types of communications, please e-mail [email or web address]

## 2018-10-17 NOTE — PROGRESS NOTES
"Subjective:       Patient ID: Lorraine Childers is a 52 y.o. female.    Chief Complaint: Cough    52 year old with a history of rotator cuff repair in July and developed acute dyspnea on exertion.  CTA at that time with bilateral atelectasis.  In July, developed hip pain and CT of the abdomen revealed GGO.   Patient is a current smoker.        Review of Systems   Constitutional: Positive for weight gain.   HENT: Positive for congestion. Negative for trouble swallowing.    Eyes: Negative for itching.   Respiratory: Positive for shortness of breath (mild).         Started walking for exercise and no dyspnea.   Cardiovascular: Negative for chest pain and leg swelling.   Genitourinary: Negative for difficulty urinating.   Endocrine: Negative for cold intolerance and heat intolerance.    Musculoskeletal: Positive for arthralgias.   Gastrointestinal: Negative for acid reflux.   Neurological: Negative for headaches.   Hematological: Negative for adenopathy.   Psychiatric/Behavioral: Negative for confusion.       Objective:       Vitals:    10/17/18 0955   BP: 120/78   BP Location: Right arm   Patient Position: Sitting   Pulse: 76   SpO2: 99%   Weight: 96.6 kg (213 lb)   Height: 5' 7" (1.702 m)     Physical Exam   Constitutional: She is oriented to person, place, and time. She appears well-developed and well-nourished.   HENT:   Head: Normocephalic.   Nose: Nose normal.   Neck: Normal range of motion. Neck supple. No tracheal deviation present.   Cardiovascular: Normal rate, regular rhythm, normal heart sounds and intact distal pulses.   Pulmonary/Chest: Normal expansion, symmetric chest wall expansion, effort normal and breath sounds normal.   Abdominal: Soft. Bowel sounds are normal. There is no hepatosplenomegaly.   Musculoskeletal: Normal range of motion. She exhibits no edema.   Lymphadenopathy: No supraclavicular adenopathy is present.     She has no cervical adenopathy.   Neurological: She is alert and oriented " to person, place, and time. No cranial nerve deficit.   Skin: Skin is warm and dry.   Psychiatric: She has a normal mood and affect.     Personal Diagnostic Review  CT of chest performed on April 2018  with contrast revealed bilateral atelectasis  CT of abdomen in July, atelectasis resolved and had a 2 mm nodule    Pulmonary Studies Review 10/17/2018   FVC 3.9   FVC% 113   FEV1 3.11   FEV1% 111   FEV1/FVC 79.7   FEF 25-75 3.07   FEF 25-75% 106   DLCO (ml/mmHg sec) 18.2   DLCO% 88   SpO2 99   FiO2 (%) 21   Height 67.000   Weight 3408   BMI (Calculated) 33.4   Predicted Distance 366.42   Predicted Distance Meters (Calculated) 504.31   .  No flowsheet data found.      Assessment:       1. Smoker    2. Lung nodule    3. Pulmonary nodule    4. Tobacco user        Outpatient Encounter Medications as of 10/17/2018   Medication Sig Dispense Refill    LORazepam (ATIVAN) 0.5 MG tablet 1 TABLET BY MOUTH EVERY 6 HOURS AS NEEDED 90 tablet 3    atorvastatin (LIPITOR) 40 MG tablet Take 1 tablet (40 mg total) by mouth once daily. 90 tablet 3    celecoxib (CELEBREX) 200 MG capsule Take 1 capsule (200 mg total) by mouth 2 (two) times daily. 60 capsule 1    diazePAM (VALIUM) 5 MG tablet Take 1 tablet (5 mg total) by mouth every 6 (six) hours as needed for Anxiety (take one tab 20 minutes prior to MRI). 3 tablet 0    esomeprazole (NEXIUM) 40 MG capsule Take 1 capsule (40 mg total) by mouth before breakfast. 30 capsule 3    meloxicam (MOBIC) 15 MG tablet Take 1 tablet by mouth once daily with food. Take 20mg prilosec once daily on days that you are taking the mobic to protect your stomach. 14 tablet 0    promethazine (PHENERGAN) 25 MG tablet TAKE 1 TABLET(25 MG) BY MOUTH EVERY 12 HOURS AS NEEDED FOR NAUSEA 45 tablet 0    [DISCONTINUED] baclofen (LIORESAL) 20 MG tablet Take 1 tablet (20 mg total) by mouth 3 (three) times daily. 90 tablet 0     No facility-administered encounter medications on file as of 10/17/2018.      Orders  Placed This Encounter   Procedures    CT Chest Without Contrast     Standing Status:   Future     Standing Expiration Date:   10/17/2019     Order Specific Question:   May the Radiologist modify the order per protocol to meet the clinical needs of the patient?     Answer:   Yes    Ambulatory referral to Smoking Cessation Program     Referral Priority:   Routine     Referral Type:   Consultation     Referral Reason:   Specialty Services Required     Requested Specialty:   CTTS     Number of Visits Requested:   1     Plan:     Problem List Items Addressed This Visit     Tobacco user    Overview     Motivated to quit  Quit [birth year]  Consult smoking cessation to provide tools needed.             Current Assessment & Plan     Counseled 10 minutes         Pulmonary nodule    Overview     2 mm detected July 2018.  ATelectasis resolved.  Follow up in one year with CT prior.           Other Visit Diagnoses     Smoker    -  Primary    Relevant Orders    Ambulatory referral to Smoking Cessation Program    Lung nodule        Relevant Orders    CT Chest Without Contrast

## 2018-10-19 ENCOUNTER — TELEPHONE (OUTPATIENT)
Dept: SPORTS MEDICINE | Facility: CLINIC | Age: 53
End: 2018-10-19

## 2018-10-19 ENCOUNTER — TELEPHONE (OUTPATIENT)
Dept: PAIN MEDICINE | Facility: CLINIC | Age: 53
End: 2018-10-19

## 2018-10-19 NOTE — TELEPHONE ENCOUNTER
----- Message from Katelynn Marcum sent at 10/19/2018 12:39 PM CDT -----  Contact: pt  Name of Who is Calling:CHRISS LAY [[id number]]    What is the request in detail: patient would like a call back to discuss procedure     Can the clinic reply by [facility]: No    What Number to [facility] Back [facility]: [phone]

## 2018-10-19 NOTE — TELEPHONE ENCOUNTER
Staff contacted the patient to further discuss her concerns.    Patient did not answer therefore staff attempted to leave a voice message but was unable to due to a voice message box being full staff tried to call twice.

## 2018-10-19 NOTE — TELEPHONE ENCOUNTER
Unable to leave voicemail due to mail box being full. Will try to call the patient again.    ----- Message from Sarahi Kinney MA sent at 10/17/2018  5:22 PM CDT -----  Contact: Self      ----- Message -----  From: Kiya Cohen  Sent: 10/17/2018   2:52 PM  To: Edwar MURPHY Staff    Patient states she needs a referral that is not pain management. States the problem is a pinched nerve and she needs a steroid shot.  States she does not want pain pills, she needs a doctor that specializes in what her problem is.  Pain Management does not accept her insurance.  [phone]

## 2018-10-19 NOTE — TELEPHONE ENCOUNTER
Spoke to the patient. She is very nervous about her injections with Dr. Thompson, provided the contact for his office so she can ask more questions of his staff regarding the procedure. Patient is pleased and thankful for the information.

## 2018-10-29 ENCOUNTER — HOSPITAL ENCOUNTER (OUTPATIENT)
Facility: OTHER | Age: 53
Discharge: HOME | End: 2018-10-29
Attending: ANESTHESIOLOGY | Admitting: ANESTHESIOLOGY
Payer: MEDICAID

## 2018-10-29 VITALS
OXYGEN SATURATION: 97 % | HEART RATE: 70 BPM | BODY MASS INDEX: 32.96 KG/M2 | TEMPERATURE: 98 F | SYSTOLIC BLOOD PRESSURE: 114 MMHG | RESPIRATION RATE: 18 BRPM | HEIGHT: 67 IN | WEIGHT: 210 LBS | DIASTOLIC BLOOD PRESSURE: 77 MMHG

## 2018-10-29 DIAGNOSIS — M54.16 LUMBAR RADICULOPATHY: Primary | ICD-10-CM

## 2018-10-29 PROCEDURE — 63600175 PHARM REV CODE 636 W HCPCS: Performed by: ANESTHESIOLOGY

## 2018-10-29 PROCEDURE — 25000003 PHARM REV CODE 250: Performed by: ANESTHESIOLOGY

## 2018-10-29 PROCEDURE — 99152 MOD SED SAME PHYS/QHP 5/>YRS: CPT | Mod: ,,, | Performed by: ANESTHESIOLOGY

## 2018-10-29 PROCEDURE — 64484 NJX AA&/STRD TFRM EPI L/S EA: CPT | Mod: LT,,, | Performed by: ANESTHESIOLOGY

## 2018-10-29 PROCEDURE — 64483 NJX AA&/STRD TFRM EPI L/S 1: CPT | Performed by: ANESTHESIOLOGY

## 2018-10-29 PROCEDURE — 25500020 PHARM REV CODE 255: Performed by: ANESTHESIOLOGY

## 2018-10-29 PROCEDURE — 64483 NJX AA&/STRD TFRM EPI L/S 1: CPT | Mod: LT,,, | Performed by: ANESTHESIOLOGY

## 2018-10-29 PROCEDURE — 64484 NJX AA&/STRD TFRM EPI L/S EA: CPT | Performed by: ANESTHESIOLOGY

## 2018-10-29 RX ORDER — LIDOCAINE HYDROCHLORIDE 10 MG/ML
INJECTION, SOLUTION EPIDURAL; INFILTRATION; INTRACAUDAL; PERINEURAL
Status: DISCONTINUED | OUTPATIENT
Start: 2018-10-29 | End: 2018-10-29 | Stop reason: HOSPADM

## 2018-10-29 RX ORDER — SODIUM CHLORIDE 9 MG/ML
500 INJECTION, SOLUTION INTRAVENOUS CONTINUOUS
Status: DISCONTINUED | OUTPATIENT
Start: 2018-10-29 | End: 2018-10-29 | Stop reason: HOSPADM

## 2018-10-29 RX ORDER — DEXAMETHASONE SODIUM PHOSPHATE 100 MG/10ML
INJECTION INTRAMUSCULAR; INTRAVENOUS
Status: DISCONTINUED | OUTPATIENT
Start: 2018-10-29 | End: 2018-10-29 | Stop reason: HOSPADM

## 2018-10-29 RX ORDER — FENTANYL CITRATE 50 UG/ML
INJECTION, SOLUTION INTRAMUSCULAR; INTRAVENOUS
Status: DISCONTINUED | OUTPATIENT
Start: 2018-10-29 | End: 2018-10-29 | Stop reason: HOSPADM

## 2018-10-29 RX ORDER — LIDOCAINE HYDROCHLORIDE 10 MG/ML
INJECTION INFILTRATION; PERINEURAL
Status: DISCONTINUED | OUTPATIENT
Start: 2018-10-29 | End: 2018-10-29 | Stop reason: HOSPADM

## 2018-10-29 RX ORDER — MIDAZOLAM HYDROCHLORIDE 1 MG/ML
INJECTION INTRAMUSCULAR; INTRAVENOUS
Status: DISCONTINUED | OUTPATIENT
Start: 2018-10-29 | End: 2018-10-29 | Stop reason: HOSPADM

## 2018-10-29 NOTE — DISCHARGE INSTRUCTIONS
Adult Procedural Sedation Instructions    Recovery After Procedural Sedation (Adult)  You have been given medicine by vein to make you sleep during your surgery. This may have included both a pain medicine and sleeping medicine. Most of the effects have worn off. But you may still have some drowsiness for the next 6 to 8 hours.  Home care  Follow these guidelines when you get home:  · For the next 8 hours, you should be watched by a responsible adult. This person should make sure your condition is not getting worse.  · Don't drink any alcohol for the next 24 hours.  · Don't drive, operate dangerous machinery, or make important business or personal decisions during the next 24 hours.  Note: Your healthcare provider may tell you not to take any medicine by mouth for pain or sleep in the next 4 hours. These medicines may react with the medicines you were given in the hospital. This could cause a much stronger response than usual.  Follow-up care  Follow up with your healthcare provider if you are not alert and back to your usual level of activity within 12 hours.  When to seek medical advice  Call your healthcare provider right away if any of these occur:  · Drowsiness gets worse  · Weakness or dizziness gets worse  · Repeated vomiting  · You can't be awakened   Date Last Reviewed: 10/18/2016  © 7930-0268 [employer]. [address] [place] [address], [place] [address]. All rights reserved. This information is not intended as a substitute for professional medical care. Always follow your healthcare professional's instructions.       Thank you for allowing us to care for you today. You may receive a survey about the care we provided. Your feedback is valuable and helps us provide excellent care throughout the community.     Home Care Instructions for Pain Management:    1. DIET:   You may resume your normal diet today.   2. BATHING:   You may shower with luke warm water. No tub baths or anything that will soak  injection sites under water for the next 24 hours.  3. DRESSING:   You may remove your bandage today.   4. ACTIVITY LEVEL:   You may resume your normal activities 24 hrs after your procedure. Nothing strenuous today.  5. MEDICATIONS:   You may resume your normal medications today. To restart blood thinners, ask your doctor.  6. DRIVING    If you have received any sedatives by mouth today, you may not drive for 12 hours.    If you have received any sedation through your IV, you may not drive for 24 hrs.   7. SPECIAL INSTRUCTIONS:   No heat to the injection site for 24 hrs including, hot bath or shower, heating pad, moist heat, or hot tubs.    Use ice pack to injection site for any pain or discomfort.  Apply ice packs for 20 minute intervals as needed.    IF you have diabetes, be sure to monitor your blood sugar more closely. IF your injection contained steroids your blood sugar levels may become higher than normal.    If you are still having pain upon discharge:  Your pain may improve over the next 48 hours. The anesthetic (numbing medication) works immediately to 48 hours. IF your injection contained a steroid (anti-inflammatory medication), it takes approximately 3 days to start feeling relief and 7-10 days to see your greatest results from the medication. It is possible you may need subsequent injections. This would be discussed at your follow up appointment with pain management or your referring doctor.      PLEASE CALL YOUR DOCTOR IF:  1. Redness or swelling around the injection site.  2. Fever of 101 degrees or more  3. Drainage (pus) from the injection site.  4. For any continuous bleeding (some dried blood over the incision is normal.)    FOR EMERGENCIES:   If any unusual problems or difficulties occur during clinic hours, call [phone] or [phone].

## 2018-10-29 NOTE — H&P
HPI  52 F with lumbar radiculopathy, here for left L2, L3 TF HAYDEE.    PMHx, PSHx, Allergies, Medications reviewed in epic    ROS negative except pain complaints in HPI    OBJECTIVE:    LMP 08/29/2012     PHYSICAL EXAMINATION:    GENERAL: Well appearing, in no acute distress, alert and oriented x3.  PSYCH:  Mood and affect appropriate.  SKIN: Skin color, texture, turgor normal, no rashes or lesions.  CV: RRR with palpation of the radial artery.  PULM: No evidence of respiratory difficulty, symmetric chest rise. Clear to auscultation.  NEURO: Cranial nerves grossly intact.    Plan:    Proceed with procedure as planned - left L2, L3 TF HAYDEE    Luis Ayala  10/29/2018

## 2018-10-29 NOTE — OP NOTE
Date of Service: 10/29/2018    PCP: Jose Felix NP    Referring Physician:    Time-out taken to identify patient and procedure side prior to starting the procedure.   I attest that I have reviewed the patient's home medications prior to the procedure and no contraindication have been identified. I  re-evaluated the patient after the patient was positioned for the procedure in the procedure room immediately before the procedural time-out. The vital signs are current and represent the current state of the patient which has not significantly changed since the preprocedure assessment.                                                           PROCEDURE: Left L2, L3 transforaminal epidural steroid injection under fluoroscopy    REASON FOR PROCEDURE: Left Lumbar radiculopathy [M54.16]  1. Lumbar radiculopathy      POSTPROCEDURE DIAGNOSIS:   Lumbar radiculopathy [M54.16]    1. Lumbar radiculopathy           PHYSICIAN: Ten Thompson MD  ASSISTANTS:none    MEDICATIONS INJECTED:  Preservative-free dexamethasone 10mg, Xylocaine 1% MPF 3-5ml. 3ml per level. Preservative free, sterile normal saline is used to get larger volume as needed.  LOCAL ANESTHETIC INJECTED:  Xylocaine 1% 9ml with Sodium Bicarbonate 1ml. 3ml per site.    SEDATION MEDICATIONS: 4 mg midazolam; 50 mcg Fentanyl  ESTIMATED BLOOD LOSS:  None.    COMPLICATIONS:  None.    TECHNIQUE:   Laying in a prone position, the patient was prepped and draped in the usual sterile fashion using ChloraPrep and fenestrated drape.  The area to be injected was determined under fluoroscopic guidance.  Local anesthetic was given by raising a wheel and going down to the hub of a 27-gauge 1.25 inch needle.  The 3.5inch 22-gauge spinal needle was introduced towards the transverse process of each above named nerve root level.  The needle was walked medially then hinged into the neural foramen.  Omnipaque was injected to confirm appropriate placement and that there was no vascular  runoff.  The medication was then injected after applying negative pressure. The patient tolerated the procedure well.    PAIN BEFORE THE PROCEDURE: 5/10.    PAIN AFTER THE PROCEDURE: 0/10.    The patient was monitored after the procedure.  Patient was given post procedure and discharge instructions to follow at home.  We will see the patient back in two weeks or the patient may call to inform of status. The patient was discharged in a stable condition.

## 2018-10-31 ENCOUNTER — TELEPHONE (OUTPATIENT)
Dept: SPORTS MEDICINE | Facility: CLINIC | Age: 53
End: 2018-10-31

## 2018-10-31 ENCOUNTER — TELEPHONE (OUTPATIENT)
Dept: PAIN MEDICINE | Facility: CLINIC | Age: 53
End: 2018-10-31

## 2018-10-31 NOTE — TELEPHONE ENCOUNTER
----- Message from Johanna Cardoso sent at 10/31/2018 11:32 AM CDT -----  Contact: Self            Name of Who is Calling: CHRISS LAY [[id number]]      What is the request in detail: Pt states her hip pain has not gone away and she had an injection on 10/29. Pt states she is having pain. Please contact to further discuss and advise.        Can the clinic reply by [facility]: N      What Number [facility] Back [facility]: [phone]

## 2018-10-31 NOTE — TELEPHONE ENCOUNTER
Contacted the patient. Mailbox is full unable to leave message  ----- Message from Aliza Sharma sent at 10/31/2018  2:03 PM CDT -----  Contact: Self  Pt called requesting a return call back from Lloyd regarding surgery related issues. Pt could be reached at [phone]

## 2018-10-31 NOTE — TELEPHONE ENCOUNTER
Staff contacted and spoke to ms. Wilde,   She is reporting increase pain after her procedure on Monday 10/29/2018.    She was informed of office protocol : There is the possibility of pain after a procedure. It can last up to 7 days after the procedure. If no contraindication we suggest you take OTC medication (aleve, Advil, etc) unless you have some pain medication prescribed to you then you can take those as prescribed. You can also you cold compress on the injection area. The procedure you had was a epidural steroid injection is not instantaneous, it is also not guaranteed. We give the injection 14 full days to be beneficial to the pain. The results varies per patient.     Ms. wilde is not happy to hear this information, she is requesting something be called in to help with her pain.     She was informed that unfortunately Dr. Thompson hasn't evaluated her in a clinic setting as she was a direct referral for the procedure from Dr. Vann, she would have to request something from his office.     Ms. Wilde, verbalized understanding but is unhappy about the matter.

## 2018-11-05 ENCOUNTER — TELEPHONE (OUTPATIENT)
Dept: SMOKING CESSATION | Facility: CLINIC | Age: 53
End: 2018-11-05

## 2019-01-08 ENCOUNTER — OFFICE VISIT (OUTPATIENT)
Dept: SPORTS MEDICINE | Facility: CLINIC | Age: 54
End: 2019-01-08
Payer: MEDICAID

## 2019-01-08 VITALS
SYSTOLIC BLOOD PRESSURE: 115 MMHG | BODY MASS INDEX: 32.96 KG/M2 | WEIGHT: 210 LBS | HEART RATE: 73 BPM | DIASTOLIC BLOOD PRESSURE: 72 MMHG | HEIGHT: 67 IN

## 2019-01-08 DIAGNOSIS — M25.612 DECREASED ROM OF LEFT SHOULDER: ICD-10-CM

## 2019-01-08 DIAGNOSIS — M54.16 LUMBAR RADICULOPATHY, ACUTE: Primary | ICD-10-CM

## 2019-01-08 DIAGNOSIS — Z98.890 S/P ARTHROSCOPY OF LEFT SHOULDER: ICD-10-CM

## 2019-01-08 DIAGNOSIS — M75.122 COMPLETE TEAR OF LEFT ROTATOR CUFF: ICD-10-CM

## 2019-01-08 PROCEDURE — 99999 PR PBB SHADOW E&M-EST. PATIENT-LVL IV: CPT | Mod: PBBFAC,,, | Performed by: PHYSICIAN ASSISTANT

## 2019-01-08 PROCEDURE — 99214 OFFICE O/P EST MOD 30 MIN: CPT | Mod: S$PBB,,, | Performed by: PHYSICIAN ASSISTANT

## 2019-01-08 PROCEDURE — 99999 PR PBB SHADOW E&M-EST. PATIENT-LVL IV: ICD-10-PCS | Mod: PBBFAC,,, | Performed by: PHYSICIAN ASSISTANT

## 2019-01-08 PROCEDURE — 99214 PR OFFICE/OUTPT VISIT, EST, LEVL IV, 30-39 MIN: ICD-10-PCS | Mod: S$PBB,,, | Performed by: PHYSICIAN ASSISTANT

## 2019-01-08 PROCEDURE — 99214 OFFICE O/P EST MOD 30 MIN: CPT | Mod: PBBFAC,PO | Performed by: PHYSICIAN ASSISTANT

## 2019-01-08 RX ORDER — GABAPENTIN 100 MG/1
100 CAPSULE ORAL 3 TIMES DAILY
Qty: 90 CAPSULE | Refills: 1 | Status: SHIPPED | OUTPATIENT
Start: 2019-01-08

## 2019-01-08 RX ORDER — CELECOXIB 200 MG/1
200 CAPSULE ORAL 2 TIMES DAILY
Qty: 60 CAPSULE | Refills: 0 | Status: SHIPPED | OUTPATIENT
Start: 2019-01-08 | End: 2020-05-08

## 2019-01-08 NOTE — PROGRESS NOTES
Subjective:          Chief Complaint: Lorraine Childers is a 53 y.o. female who had concerns including Pain of the Left Shoulder and Pain of the Lower Back.    Here today for follow-up lumbar pain with radiculopathy to LLE and MRI results.    Interval hx: Reports acute pain episode 1 week ago, causing her N/V and difficulty using her lower extremities. She reports CSI by Dr. Thompson gave her moderate relief for 2-3 weeks but pain returned. She would like referral to Spine surgeon today.     Previous hx: + numbness to thighs bilaterally. States she has had several episodes of acute back/buttock/lateral thigh pain on the L. Have been so severe she has N/v. Seen in ER x2 and CT abdomen performed which showed no intra-abdominal abnormality. Referred to ortho for management.    As far as her L shoulder she is doing well.    She denies any new injuries, trauma, or falls.     DATE OF PROCEDURE: 4/20/2018     ATTENDING SURGEON: Surgeon(s) and Role:     * Susanna Vann MD - Primary  Assistants:  Juan Luis Smith MD-Fellow  Lisette Akbar PA-C-Assistant     PREOPERATIVE DIAGNOSIS:  Left shoulder   Tendinitis, Biceps M75.20 A-C Arthritis M12.9, Rotator Cuff Syndrome/Disorder  M75.100 and Tear, Rotator Cuff, Non-traumatic M75.100     POSTOPERATIVE DIAGNOSIS: Left shoulder   Tendinitis, Biceps M75.20 A-C Arthritis M12.9, Rotator Cuff Syndrome/Disorder  M75.100, Tear, Rotator Cuff, Non-traumatic M75.100 and Tendinitis, Biceps M75.20            PROCEDURES PERFORMED:  1. Left shoulder Arthroscopic rotator cuff repair CPT - 19454     2. Left shoulder Biceps tenodesis CPT - 16687     3. Left shoulder Arthroscopic distal clavicle excision CPT - 78763     4. Left shoulder Amniox Arthrocentesis, 58873      Handedness: right-handed  Sport played:    Level:            Pain   This is a chronic problem. The current episode started more than 1 month ago. The problem occurs intermittently. The problem has been rapidly worsening.  Associated symptoms include neck pain. Pertinent negatives include no chest pain, fever, joint swelling, numbness or rash. The symptoms are aggravated by exertion. The treatment provided no relief.       Review of Systems   Constitution: Negative for fever and night sweats.   HENT: Negative for hearing loss.    Eyes: Negative for blurred vision and visual disturbance.   Cardiovascular: Negative for chest pain and leg swelling.   Respiratory: Negative for shortness of breath.    Endocrine: Negative for polyuria.   Hematologic/Lymphatic: Negative for bleeding problem.   Skin: Negative for rash.   Musculoskeletal: Positive for back pain, joint pain, neck pain and stiffness. Negative for joint swelling, muscle cramps and muscle weakness.   Gastrointestinal: Negative for melena.   Genitourinary: Negative for hematuria.   Neurological: Positive for paresthesias. Negative for loss of balance and numbness.   Psychiatric/Behavioral: Negative for altered mental status.       Pain Related Questions  Over the past 3 days, what was your average pain during activity? (I.e. running, jogging, walking, climbing stairs, getting dressed, ect.): 8  Over the past 3 days, what was your highest pain level?: 8  Over the past 3 days, what was your lowest pain level? : 2    Other  Was the patient's HEIGHT measured or patient reported?: Patient Reported  Was the patient's WEIGHT measured or patient reported?: Measured      Objective:        General: Lorraine Fonseca is well-developed, well-nourished, appears stated age, in no acute distress, alert and oriented to time, place and person.     General    Vitals reviewed.  Constitutional: She is oriented to person, place, and time. She appears well-developed and well-nourished. No distress.   HENT:   Nose: Nose normal.   Mouth/Throat: No oropharyngeal exudate.   Eyes: Pupils are equal, round, and reactive to light. Right eye exhibits no discharge. Left eye exhibits no discharge.   Neck: Normal  range of motion.   Cardiovascular: Normal rate and intact distal pulses.    Pulmonary/Chest: Effort normal and breath sounds normal. No respiratory distress.   Neurological: She is alert and oriented to person, place, and time. She has normal reflexes. She displays normal reflexes. No cranial nerve deficit. Coordination normal.   Psychiatric: She has a normal mood and affect. Her behavior is normal. Judgment and thought content normal.     General Musculoskeletal Exam   Gait: normal   Pelvic Obliquity: none      Right Knee Exam     Inspection   Alignment:  normal  Erythema: absent  Scars: absent  Swelling: absent  Effusion: absent  Deformity: absent  Bruising: absent    Tenderness   The patient is experiencing no tenderness.     Range of Motion   Extension: 0   Flexion: 140     Tests   Meniscus   Elina:  Medial - negative Lateral - negative  Ligament Examination Lachman: normal (-1 to 2mm) PCL-Posterior Drawer: normal (0 to 2mm)     MCL - Valgus: normal (0 to 2mm)  LCL - Varus: normalPivot Shift: normal (Equal)Reverse Pivot Shift: normal (Equal)Dial Test at 30 degrees: normal (< 5 degrees)Dial Test at 90 degrees: normal (< 5 degrees)  Posterior Sag Test: negative  Posterolateral Corner: unstable (>15 degrees difference)  Patella   Patellar apprehension: negative  Passive Patellar Tilt: neutral  Patellar Tracking: normal  Patellar Glide (quadrants): Lateral - 1   Medial - 2  Q-Angle at 90 degrees: normal  Patellar Grind: negative  J-Sign: none    Other   Meniscal Cyst: absent  Popliteal (Baker's) Cyst: absent  Sensation: normal    Left Knee Exam     Inspection   Alignment:  normal  Erythema: absent  Scars: absent  Swelling: absent  Effusion: absent  Deformity: absent  Bruising: absent    Tenderness   The patient is experiencing no tenderness.     Range of Motion   Extension: 0   Flexion: 140     Tests   Meniscus   Elina:  Medial - negative Lateral - negative  Stability Lachman: normal (-1 to 2mm) PCL-Posterior  Drawer: normal (0 to 2mm)  MCL - Valgus: normal (0 to 2mm)  LCL - Varus: normal (0 to 2mm)Pivot Shift: normal (Equal)Reverse Pivot Shift: normal (Equal)Dial Test at 30 degrees: normal (< 5 degrees)Dial Test at 90 degrees: normal (< 5 degrees)  Posterior Sag Test: negative  Posterolateral Corner: unstable (>15 degrees difference)  Patella   Patellar apprehension: negative  Passive Patellar Tilt: neutral  Patellar Tracking: normal  Patellar Glide (Quadrants): Lateral - 1 Medial - 2  Q-Angle at 90 degrees: normal  Patellar Grind: negative  J-Sign: J sign absent    Other   Meniscal Cyst: absent  Popliteal (Baker's) Cyst: absent  Sensation: normal    Right Hip Exam     Inspection   Scars: absent  Swelling: absent  Bruising: absent  No deformity of hip.  Quadriceps Atrophy:  Negative  Erythema: absent    Range of Motion   Abduction:  40 normal   Adduction:  20 normal   Extension:  0 normal   Flexion:  110 normal   External rotation:  60 normal   Internal rotation:  15 normal     Tests   Pain w/ forced internal rotation (SAMIA): absent  Pain w/ forced external rotation (FADIR): absent  Alix: negative  Trendelenburg Test: negative  Circumduction test: negative  Stinchfield test: negative  Log Roll: negative  Snapping Hip (internal): negative  Step-down test: negative  Resisted sit-up pain: negative  Unresisted sit-up pain: negative  Resisted sit-up pain with adductor contraction pain: negative    Other   Sensation: decreased    Comments:  SLR + at 45 degrees  Left Hip Exam     Inspection   Scars: absent  Swelling: absent  No deformity of hip.  Quadriceps Atrophy:  negative  Erythema: absent  Bruising: absent    Range of Motion   Abduction:  40 normal   Adduction:  20 normal   Extension:  0 normal   Flexion:  110 normal   External rotation:  60 normal   Internal rotation: 15 normal     Tests   Pain w/ forced internal rotation (SAMIA): absent  Pain w/ forced external rotation (FADIR): absent  Alix: negative  Trendelenburg  Test: negative  Circumduction test: negative  Stinchfield test: negative  Log Roll: negative  Snapping Hip (internal): negative  Step-down test: negative  Resisted sit-up pain: negative  Resisted sit-up pain with adductor contraction pain: negative  Unresisted sit-up pain: negative    Other   Sensation: decreased    Comments:  SLR + at 45 degrees      Back (L-Spine & T-Spine) / Neck (C-Spine) Exam   Back exam is normal.  Right Shoulder Exam     Inspection/Observation   Swelling: absent  Bruising: absent  Scars: absent  Deformity: absent  Scapular Winging: absent  Scapular Dyskinesia: negative  Atrophy: absent    Tenderness   The patient is experiencing no tenderness.    Range of Motion   Active abduction:  90 normal   Passive abduction:  100 normal   Extension:  0 normal   Forward Flexion:  180 normal   Forward Elevation: 180 normal  Adduction: 40 normal  External Rotation 0 degrees:  50 normal   External Rotation 90 degrees: 90 normal  Internal rotation 0 degrees:  T8 normal   Internal rotation 90 degrees:  30 normal     Tests & Signs   Apprehension: negative  Cross arm: negative  Drop arm: negative  Curiel test: negative  Impingement: negative  Sulcus: absent  Anterosuperior Escape: negative  Lag Sign 0 degrees: negative  Lag Sign 90 degrees: negative  Lift Off Sign: negative  Belly Press: negative  Active Compression Test (Azle's Sign): negative  Yergason's Test: negative  Speed's Test: negative  Anterior Drawer Test: 1+   Posterior Drawer Test: 1+  Relocation 90 degrees: negative  Relocation > 90 degrees: negative  Bear Hug: negative  Moving Valgus: negative  Jerk Test: negative    Other   Sensation: normal    Left Shoulder Exam     Inspection/Observation   Swelling: absent  Bruising: absent  Scars: present  Deformity: absent  Scapular Winging: absent  Scapular Dyskinesia: negative  Atrophy: absent    Tenderness   The patient is tender to palpation of the greater tuberosity and biceps tendon.    Range of  Motion   Active abduction:  90 abnormal   Passive abduction:  90 abnormal   Extension:  0 normal   Forward Flexion:  140 abnormal   Forward Elevation: 140 abnormal  Adduction: 40 normal  External Rotation 0 degrees:  40 abnormal   External Rotation 90 degrees: 20 abnormal  Internal rotation 0 degrees:  L1 abnormal   Internal rotation 90 degrees:  0 abnormal     Tests & Signs   Apprehension: negative  Cross arm: negative  Drop arm: negative  Curiel test: negative  Impingement: negative  Sulcus: absent  Rotator Cuff Painful Arc/Range: moderate  Anterosuperior Escape: negative  Lag Sign 0 degrees: negative  Lag Sign 90 degrees: negative  Lift Off Sign: negative  Belly Press: negative  Active Compression test ([place]'s Sign): negative  Yergasons's Test: negative  Speed's Test: negative  Anterior Drawer Test: 1+  Posterior Drawer Test: 1+  Relocation 90 degrees: negative  Relocation > 90 degrees: negative  Bear Hug: negative  Moving Valgus: negative  Jerk Test: negative    Other   Sensation: normal     Comments:  Incisions healed    No signs of worsened weakness of the rotator cuff muscles.       Muscle Strength   Right Upper Extremity   Shoulder Abduction: 5/5   Shoulder Internal Rotation: 5/5   Shoulder External Rotation: 5/5   Supraspinatus: 5/5/5   Subscapularis: 5/5/5   Biceps: 5/5/5   Left Upper Extremity  Shoulder Abduction: 5/5   Shoulder Internal Rotation: 5/5   Shoulder External Rotation: 4/5   Supraspinatus: 4/5/5   Subscapularis: 4/5/5   Biceps: 4/5/5   Right Lower Extremity   Hip Abduction: 5/5   Hip Adduction: 5/5   Hip Flexion: 5/5   Left Lower Extremity   Hip Abduction: 5/5   Hip Adduction: 5/5   Hip Flexion: 5/5     Reflexes     Left Side  Biceps:  2+  Triceps:  2+  Brachioradialis:  2+  Quadriceps:  2+  Achilles:  2+    Right Side   Biceps:  2+  Triceps:  2+  Brachioradialis:  2+  Quadriceps:  2+  Achilles:  2+    Vascular Exam     Right Pulses  Dorsalis Pedis:      2+  Posterior Tibial:       2+  Radial:                    2+      Left Pulses  Dorsalis Pedis:      2+  Posterior Tibial:      2+  Radial:                    2+      Capillary Refill  Right Hand: normal capillary refill  Left Hand: normal capillary refill    Edema  Right Upper Leg: absent  Left Upper Leg: absent    RADIOGRAPHS:  Left shoulder:  FINDINGS:  Postoperative changes identified.  Mild DJD.  No fracture or bone destruction    MRI LUMBAR SPINE WITHOUT CONTRAST    CLINICAL HISTORY:  radiculopathy; Pain in left hip    TECHNIQUE:  Multiplanar, multisequence MR images were acquired from the thoracolumbar junction to the sacrum without the administration of contrast.    COMPARISON:  Lumbar spine radiographs 05/18/2017    FINDINGS:  Alignment: Normal.    Vertebrae: Normal marrow signal. No fracture.    Discs: Multilevel disc desiccation, disc height loss, and endplate degenerative changes most prominent at L2-L3.    Cord: Normal.  Conus terminates at L2.    Degenerative findings:    T12-L1: No evidence of neural foraminal narrowing or spinal canal stenosis.    L1-L2: Mild diffuse disc bulge without significant neural foraminal narrowing or spinal canal stenosis.    L2-L3: Diffuse disc bulge and mild bilateral facet arthropathy without significant neural foraminal narrowing or spinal canal stenosis.    L3-L4: Diffuse disc bulge slightly asymmetric to the right, ligamentum flavum buckling, and mild bilateral facet arthropathy resulting in mild right neural foraminal narrowing without significant spinal canal stenosis.    L4-L5: Diffuse disc bulge, ligamentum flavum buckling, and mild bilateral facet arthropathy without significant neural foraminal narrowing or spinal canal stenosis.    L5-S1: Mild bilateral facet arthropathy without significant neural foraminal narrowing or spinal canal stenosis.    Paraspinal muscles & soft tissues: Unremarkable.      Impression       Mild lumbar spondylosis with mild right neural foraminal narrowing at  L3-L4.           Assessment:       Encounter Diagnoses   Name Primary?    Lumbar radiculopathy, acute Yes    Complete tear of left rotator cuff     Decreased ROM of left shoulder     S/P arthroscopy of left shoulder           Plan:       1. ASES and SF-12 was not filled out today in clinic.     RTC in 3 months with Dr. Susanna Vann. Patient will fill out ASES, SF-12 on return radiographs.     2. MRI lumbar spine results reviewed today.    3. Ambulatory referral to physical therapy for continued periscapular/rotator cuff/lower back strengthening and conditioning.    4. Referral to Spine ortho with Garland Cannon MD team.    5. WBAT LLE. No restrictions    6. Celebrex 200 mg BID and Gabapentin 100mg TID for pain management.        All of the patient's questions were answered and the patient will contact us if they have any questions or concerns in the interim.      [facility] patient questionnaires have been collected today.

## 2019-02-04 ENCOUNTER — CLINICAL SUPPORT (OUTPATIENT)
Dept: REHABILITATION | Facility: HOSPITAL | Age: 54
End: 2019-02-04
Payer: MEDICAID

## 2019-02-04 DIAGNOSIS — M54.16 LUMBAR RADICULOPATHY, ACUTE: Primary | ICD-10-CM

## 2019-02-04 DIAGNOSIS — Z98.890 S/P ARTHROSCOPY OF LEFT SHOULDER: ICD-10-CM

## 2019-02-04 DIAGNOSIS — M25.612 DECREASED ROM OF LEFT SHOULDER: ICD-10-CM

## 2019-02-04 DIAGNOSIS — M75.122 COMPLETE TEAR OF LEFT ROTATOR CUFF: ICD-10-CM

## 2019-02-04 PROCEDURE — 97110 THERAPEUTIC EXERCISES: CPT | Mod: PN

## 2019-02-04 PROCEDURE — 97161 PT EVAL LOW COMPLEX 20 MIN: CPT | Mod: PN

## 2019-02-05 NOTE — PLAN OF CARE
[facility]  Physical Therapy Initial Evaluation    Name: Lorraine Childers  Clinic Number: [id number]    Therapy Diagnosis:  Lumbar radiculopathy  Encounter Diagnoses   Name Primary?    Complete tear of left rotator cuff Yes    Decreased ROM of left shoulder     S/P arthroscopy of left shoulder      Physician: Harrison Ramos, *    Physician Orders: PT Eval and Treat   Medical Diagnosis from Referral: Lumbar radiculopathy, acute  Evaluation Date: 2/4/2019     Plan of Care Expiration: 5/5/19    Priority Start Date Expiration Date Referral Entered By   Routine 01/31/2019 05/31/2019 Harrison Ramos PA-C   Visits Requested Visits Authorized Visits Completed Visits Scheduled   1 20 1          Time In: 10:00  Time Out: 11:00    Total Billable Time: 60 minutes    Precautions: Standard    Subjective     Date of onset: 6 months    History of current condition:   Ms. Childers is a 53 year old female presenting with c/o low back pain with left lower extremity radiculopathy.  She reports an insidious onset 6 months ago.   She reports an epidural in October with moderate relief.  She reports 2 episodes of exacerbation since the epidural with the most recent in December. She was seen in our clinic last year s/p left RTC repair. She also c/o left shoulder pain occasionally with change in the weather.  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 She states she is on the road a lot.  Her goal is to decrease low back pain. She states she would like to return to exercise and walk without difficulty.      Medical History:   Past Medical History:   Diagnosis Date    Anxiety     Back problem     Diabetes mellitus type I     no meds since weight loss few years back    General anesthetics causing adverse effect in therapeutic use     sometimes hard to sedate--    Pneumonia     Thyroid disease     thyroid nodules       Surgical History:   Lorraine Childers  has a  "past surgical history that includes Hysterectomy;  section (x2); Rhinoplasty tip; and Shoulder surgery.    Medications:   Lorraine Fonseca has a current medication list which includes the following prescription(s): celecoxib, diazepam, esomeprazole, gabapentin, and lorazepam.    Allergies:   Review of patient's allergies indicates:   Allergen Reactions    Percocet [oxycodone-acetaminophen] Nausea Only     Can take Percocet if she takes Phenergan or Zofran.    Vicodin [hydrocodone-acetaminophen]      "Makes me Hyper." Doesn't help the pain.    Zofran [ondansetron hcl] Nausea And Vomiting        Imaging, MRI studies: Mild lumbar spondylosis with mild right neural foraminal narrowing at L3-L4.    Bilateral hips: Mild degenerative change.    Prior Therapy:  Last year s/p left RTC repair  Social History:  Lives alone  Occupation: Sales, human resources  Prior Level of Function: independent  Current Level of Function: independent    Pain:  Current 1/10, worst 10/10, best 1/10   Location: left lumbar, left hip  Description: crushing    Aggravating Factors: prolonged sittin min, sitting worse then standing, lifting, household chores, prolonged driving, transitional movements  Easing Factors: epidural    Pts goals:  Her goal is to decrease low back pain. She states she would like to return to exercise and walk without difficulty.         Objective     Observation:  Pt ambulates with decreased lumbopelvic mobility, guarded gait.   Palpation: mild tenderness on palpation of left lumbar paraspinals, piriformis, posterior GT      Range of Motion/Strength:      Lumbar AROM: Degrees   Flexion 40   Extension 10   Right side bending 10   Left side bending 10   Lumbar quadrant reveals: lumbar quadrant reveals increased discomfort with flexion.     AROM: Bilateral LE: Grossly WFL,   BUE: WNL   Mild scapular compensation with left fwd elevation.   MMT:  Right LE: 4   Left LE: 4  Abdominal Strength: 3+/5    Mobility: L/S " p/a grade 2-  Flexibility: hip flexor length:fair      Hamstring Length: fair    Bed Mobility:Independent  Transfers: Independent    Special Tests:   -LLD:negative  -Slump: Negative  -SLR: negative  -Hip scour: negative  -Fatmata's: Negative  -SIJ gapping and compression: Negative    CMS Impairment/Limitation/Restriction for FOTO Shoulder Survey  Status Limitation G-Code CMS Severity Modifier  Intake 59% 41% Current Status CK - At least 40 percent but less than 60 percent  Predicted 67% 33% Goal Status+ CJ - At least 20 percent but less than 40 percent    TREATMENT     Treatment Time In: 10:30  Treatment Time Out: 11:00    Total Treatment time separate from Evaluation: 30 minutes    Lorraine received therapeutic exercises to develop strength, endurance, ROM, flexibility, posture and core stabilization for 10 minutes including:     -LTR x 20  -piriformis x 20  -pelvic tilts   -TrA recruitment 2 x 10      Education provided:   - bed mobility training    Written Home Exercises Provided: yes.    Exercises were reviewed and Lorraine was able to demonstrate them prior to the end of the session.  Lorraine demonstrated good  understanding of the education provided.     See EMR under Patient Instructions for exercises provided 2/4/2019.    Assessment     Pt presents with signs and symptoms consistent with referring diagnosis. Evaluation has determined a decrease in functional status and subjective and objective deficits that can be addressed by physical therapy intervention. Pt demonstrates pain limiting functional activities. Decreased flexibility and strength limiting normal movement patterns. Decreased segmental motion. Decreased postural strength and awareness. Positive special testing. Decreased participation in functional and recreational activities. Subjective and objective measures are addressed by goals in the plan of care.  Patient/family are involved in the development of these goals. Patient/family are educated  about current injury and treatment.       Plan of care was dicussed with patient. Pt will benefit from skilled outpatient Physical Therapy to address the deficits stated above and in the chart below, provide pt/family education, and to maximize pt's level of independence. Pt's spiritual, cultural and educational needs considered and patient is agreeable to the plan of care and goals as stated below:     Pt prognosis is Good.  Anticipated Barriers for therapy: compliance with attendance    Medical Necessity is demonstrated by the following  History  Co-morbidities and personal factors that may impact the plan of care Co-morbidities:   Anxiety    Back problem    Diabetes mellitus type I    no meds since weight loss few years back   General anesthetics causing adverse effect in therapeutic use    sometimes hard to sedate--   Pneumonia    Thyroid disease    thyroid nodules       Personal Factors:   no deficits     low   Examination  Body Structures and Functions, activity limitations and participation restrictions that may impact the plan of care Body Regions:   back    Body Systems:    ROM  strength  skin integrity    Participation Restrictions:   Exercise, household chores    Activity limitations:   Learning and applying knowledge  no deficits    General Tasks and Commands  no deficits    Communication  no deficits    Mobility  lifting and carrying objects  using transportation (bus, train, plane, car)    Self care  no deficits    Domestic Life  shopping    Interactions/Relationships  no deficits    Life Areas  no deficits    Community and Social Life  recreation and leisure         low   Clinical Presentation stable and uncomplicated low   Decision Making/ Complexity Score: low     Goals:    Short Term Goals (4 Weeks):     1.Pt to increase strength by a 1/2 grade of muscles test to allow for improvement in functional activities such as performing chores.  2.Pt to improve range of motion by 25% to allow for improved  functional mobility to allow for improvement in IADLs.   3.Pt to report compliance with HEP and demonstrate proper exercise technique to PT to show competence with self management of condition.  4.Decrease pain by 25% during functional activities.    Long Term Goals (12 Weeks):     1. Increase ROM to allow improved joint biomechanics during functional activities.   2.Increase trunk and lower extremity strength to within normal limits during functional activities.   3. Independent with home exercise program.   4. Full return to functional activities with manageable complaints.  5. Patient to demonstrate improved posture and body mechanics.  6. Decrease pain by 75% during functional activities.    Plan     Plan of care Certification: 2/4/2019 to 5/4/19.    Recommended Treatment Plan: 2 times per week for 12 weeks with treatments to consist of:  Neuromuscular and postural re-education,  training, therapeutic exercise, therapeutic activities,balance training, gait training, manual therapy, soft tissue mobilization, ROM exercises, Cardiovascular,  Postural stabilization, manual traction, spinal mobilization, moist heat, cryotherapy, electrical stimulation, ultrasound, home exercise education and planning.    Ariel Davila, PT

## 2019-03-29 ENCOUNTER — TELEPHONE (OUTPATIENT)
Dept: PULMONOLOGY | Facility: CLINIC | Age: 54
End: 2019-03-29

## 2019-03-29 NOTE — TELEPHONE ENCOUNTER
I tired to call the pt back to let her know she is not due back for follow up with Dr Solorzano until October 2019 with ct prior. No answer and pt's voicemail box is full. Pt will receive a recall letter in the mail. Also, Dr Solorzano's schedule is not available for July through October.

## 2019-04-10 RX ORDER — LORAZEPAM 0.5 MG/1
TABLET ORAL
Qty: 90 TABLET | Refills: 0 | Status: SHIPPED | OUTPATIENT
Start: 2019-04-10

## 2019-06-05 ENCOUNTER — TELEPHONE (OUTPATIENT)
Dept: UROLOGY | Facility: CLINIC | Age: 54
End: 2019-06-05

## 2019-06-05 NOTE — TELEPHONE ENCOUNTER
Spoke to pt  appt was made with np oumou for 06/11/19 also advised pt to see if pcp that she is seeing today can order pt to have an ultrasound this will help np with eval of patient.-sue

## 2019-06-05 NOTE — TELEPHONE ENCOUNTER
----- Message from Lashanda Carson sent at 6/5/2019 11:28 AM CDT -----  ..Type:  Sooner Appointment Request    Patient is requesting a sooner appointment.  Patient declined first available appointment listed as well as another facility and provider .  Patient will not accept being placed on the waitlist and is requesting a message be sent to doctor.    Name of Caller: pt     When is the first available appointment? 08/12    Symptoms: Urinary Frequency    Would the patient rather a call back or a response via [facility]? Call back     Best Call Back Number: [phone]    Additional Information: pt is slot B

## 2019-11-07 ENCOUNTER — TELEPHONE (OUTPATIENT)
Dept: PULMONOLOGY | Facility: CLINIC | Age: 54
End: 2019-11-07

## 2019-11-07 DIAGNOSIS — R91.1 LUNG NODULE: Primary | ICD-10-CM

## 2020-05-01 ENCOUNTER — TELEPHONE (OUTPATIENT)
Dept: SPORTS MEDICINE | Facility: CLINIC | Age: 55
End: 2020-05-01

## 2020-05-08 ENCOUNTER — OFFICE VISIT (OUTPATIENT)
Dept: SPORTS MEDICINE | Facility: CLINIC | Age: 55
End: 2020-05-08
Payer: MEDICAID

## 2020-05-08 VITALS
HEART RATE: 80 BPM | SYSTOLIC BLOOD PRESSURE: 133 MMHG | WEIGHT: 210 LBS | HEIGHT: 67 IN | BODY MASS INDEX: 32.96 KG/M2 | DIASTOLIC BLOOD PRESSURE: 82 MMHG

## 2020-05-08 DIAGNOSIS — M62.81 WEAKNESS OF TRUNK MUSCULATURE: ICD-10-CM

## 2020-05-08 DIAGNOSIS — E66.9 OBESITY, UNSPECIFIED CLASSIFICATION, UNSPECIFIED OBESITY TYPE, UNSPECIFIED WHETHER SERIOUS COMORBIDITY PRESENT: ICD-10-CM

## 2020-05-08 DIAGNOSIS — M54.16 LUMBAR RADICULOPATHY, ACUTE: Primary | ICD-10-CM

## 2020-05-08 DIAGNOSIS — F41.9 ANXIETY: Chronic | ICD-10-CM

## 2020-05-08 DIAGNOSIS — M75.102 ROTATOR CUFF SYNDROME OF LEFT SHOULDER: ICD-10-CM

## 2020-05-08 PROCEDURE — 99999 PR PBB SHADOW E&M-EST. PATIENT-LVL IV: ICD-10-PCS | Mod: PBBFAC,,, | Performed by: ORTHOPAEDIC SURGERY

## 2020-05-08 PROCEDURE — 99999 PR PBB SHADOW E&M-EST. PATIENT-LVL IV: CPT | Mod: PBBFAC,,, | Performed by: ORTHOPAEDIC SURGERY

## 2020-05-08 PROCEDURE — 99213 OFFICE O/P EST LOW 20 MIN: CPT | Mod: S$PBB,,, | Performed by: ORTHOPAEDIC SURGERY

## 2020-05-08 PROCEDURE — 99213 PR OFFICE/OUTPT VISIT, EST, LEVL III, 20-29 MIN: ICD-10-PCS | Mod: S$PBB,,, | Performed by: ORTHOPAEDIC SURGERY

## 2020-05-08 PROCEDURE — 99214 OFFICE O/P EST MOD 30 MIN: CPT | Mod: PBBFAC | Performed by: ORTHOPAEDIC SURGERY

## 2020-05-08 RX ORDER — METHOCARBAMOL 500 MG/1
500 TABLET, FILM COATED ORAL 3 TIMES DAILY
Qty: 30 TABLET | Refills: 0 | Status: SHIPPED | OUTPATIENT
Start: 2020-05-08 | End: 2020-07-14

## 2020-05-08 RX ORDER — GABAPENTIN 300 MG/1
300 CAPSULE ORAL 3 TIMES DAILY
Qty: 90 CAPSULE | Refills: 1 | Status: SHIPPED | OUTPATIENT
Start: 2020-05-08 | End: 2020-07-06

## 2020-05-08 RX ORDER — INDOMETHACIN 75 MG/1
75 CAPSULE, EXTENDED RELEASE ORAL 2 TIMES DAILY
Qty: 60 CAPSULE | Refills: 0 | Status: SHIPPED | OUTPATIENT
Start: 2020-05-08 | End: 2020-06-09

## 2020-05-08 NOTE — PROGRESS NOTES
Subjective:          Chief Complaint: Lorraine Childers is a 54 y.o. female who had concerns including Pain of the Right Hip.    Interval history 5/8/2020:  Patient presents today for 3 weeks of right-sided radiculopathy.  Pain began after house work 3 weeks ago.  She has seen multiple emergency rooms, pain medicine doctor, orthopedist and has been told different diagnoses.  She had taken a prednisone taper about 2 weeks ago to no relief.  NSAIDs are not helping.  No traumatic injury or event.  She has had this issue before with mostly on her left side.  History of MRI concerning for multiple degenerative discs and foraminal stenoses.  No loss of bowel or bladder function.    Previous hx: + numbness to thighs bilaterally. States she has had several episodes of acute back/buttock/lateral thigh pain on the L. Have been so severe she has N/v. Seen in ER x2 and CT abdomen performed which showed no intra-abdominal abnormality. Referred to ortho for management.    As far as her L shoulder she is doing well.    She denies any new injuries, trauma, or falls.     DATE OF PROCEDURE: 4/20/2018     ATTENDING SURGEON: Surgeon(s) and Role:     * Susanna Vann MD - Primary  Assistants:  Juan Luis Smith MD-Fellow  ASIYA Arredondo-LENA-Assistant     PREOPERATIVE DIAGNOSIS:  Left shoulder   Tendinitis, Biceps M75.20 A-C Arthritis M12.9, Rotator Cuff Syndrome/Disorder  M75.100 and Tear, Rotator Cuff, Non-traumatic M75.100     POSTOPERATIVE DIAGNOSIS: Left shoulder   Tendinitis, Biceps M75.20 A-C Arthritis M12.9, Rotator Cuff Syndrome/Disorder  M75.100, Tear, Rotator Cuff, Non-traumatic M75.100 and Tendinitis, Biceps M75.20            PROCEDURES PERFORMED:  1. Left shoulder Arthroscopic rotator cuff repair CPT - 81371     2. Left shoulder Biceps tenodesis CPT - 78592     3. Left shoulder Arthroscopic distal clavicle excision CPT - 49419     4. Left shoulder Amniox Arthrocentesis, 10335    Handedness: right-handed  Sport played:     Level:            Pain   This is a chronic problem. The current episode started more than 1 month ago. The problem occurs intermittently. The problem has been rapidly worsening. Associated symptoms include neck pain. Pertinent negatives include no chest pain, fever, joint swelling, numbness or rash. The symptoms are aggravated by exertion. The treatment provided no relief.       Review of Systems   Constitution: Negative for fever and night sweats.   HENT: Negative for hearing loss.    Eyes: Negative for blurred vision and visual disturbance.   Cardiovascular: Negative for chest pain and leg swelling.   Respiratory: Negative for shortness of breath.    Endocrine: Negative for polyuria.   Hematologic/Lymphatic: Negative for bleeding problem.   Skin: Negative for rash.   Musculoskeletal: Positive for back pain, joint pain, neck pain and stiffness. Negative for joint swelling, muscle cramps and muscle weakness.   Gastrointestinal: Negative for melena.   Genitourinary: Negative for hematuria.   Neurological: Positive for paresthesias. Negative for loss of balance and numbness.   Psychiatric/Behavioral: Negative for altered mental status.       Pain Related Questions  Over the past 3 days, what was your average pain during activity? (I.e. running, jogging, walking, climbing stairs, getting dressed, ect.): 10  Over the past 3 days, what was your highest pain level?: 10  Over the past 3 days, what was your lowest pain level? : 6    Other  How many nights a week are you awakened by your affected body part?: 7  Was the patient's HEIGHT measured or patient reported?: Patient Reported  Was the patient's WEIGHT measured or patient reported?: Measured      Objective:        General: Lorraine Fonseca is well-developed, well-nourished, appears stated age, in no acute distress, alert and oriented to time, place and person.     General    Vitals reviewed.  Constitutional: She is oriented to person, place, and time. She appears  well-developed and well-nourished. No distress.   HENT:   Nose: Nose normal.   Mouth/Throat: No oropharyngeal exudate.   Eyes: Pupils are equal, round, and reactive to light. Right eye exhibits no discharge. Left eye exhibits no discharge.   Neck: Normal range of motion.   Cardiovascular: Normal rate and intact distal pulses.    Pulmonary/Chest: Effort normal and breath sounds normal. No respiratory distress.   Neurological: She is alert and oriented to person, place, and time. She has normal reflexes. She displays normal reflexes. No cranial nerve deficit. Coordination normal.   Psychiatric: She has a normal mood and affect. Her behavior is normal. Judgment and thought content normal.     General Musculoskeletal Exam   Gait: normal   Pelvic Obliquity: none      Right Knee Exam     Inspection   Alignment:  normal  Erythema: absent  Scars: absent  Swelling: absent  Effusion: absent  Deformity: absent  Bruising: absent    Tenderness   The patient is experiencing no tenderness.     Range of Motion   Extension: 0   Flexion: 140     Tests   Meniscus   Elina:  Medial - negative Lateral - negative  Ligament Examination Lachman: normal (-1 to 2mm) PCL-Posterior Drawer: normal (0 to 2mm)     MCL - Valgus: normal (0 to 2mm)  LCL - Varus: normalPivot Shift: normal (Equal)Reverse Pivot Shift: normal (Equal)Dial Test at 30 degrees: normal (< 5 degrees)Dial Test at 90 degrees: normal (< 5 degrees)  Posterior Sag Test: negative  Posterolateral Corner: unstable (>15 degrees difference)  Patella   Patellar apprehension: negative  Passive Patellar Tilt: neutral  Patellar Tracking: normal  Patellar Glide (quadrants): Lateral - 1   Medial - 2  Q-Angle at 90 degrees: normal  Patellar Grind: negative  J-Sign: none    Other   Meniscal Cyst: absent  Popliteal (Baker's) Cyst: absent  Sensation: normal    Left Knee Exam     Inspection   Alignment:  normal  Erythema: absent  Scars: absent  Swelling: absent  Effusion: absent  Deformity:  absent  Bruising: absent    Tenderness   The patient is experiencing no tenderness.     Range of Motion   Extension: 0   Flexion: 140     Tests   Meniscus   Elina:  Medial - negative Lateral - negative  Stability Lachman: normal (-1 to 2mm) PCL-Posterior Drawer: normal (0 to 2mm)  MCL - Valgus: normal (0 to 2mm)  LCL - Varus: normal (0 to 2mm)Pivot Shift: normal (Equal)Reverse Pivot Shift: normal (Equal)Dial Test at 30 degrees: normal (< 5 degrees)Dial Test at 90 degrees: normal (< 5 degrees)  Posterior Sag Test: negative  Posterolateral Corner: unstable (>15 degrees difference)  Patella   Patellar apprehension: negative  Passive Patellar Tilt: neutral  Patellar Tracking: normal  Patellar Glide (Quadrants): Lateral - 1 Medial - 2  Q-Angle at 90 degrees: normal  Patellar Grind: negative  J-Sign: J sign absent    Other   Meniscal Cyst: absent  Popliteal (Baker's) Cyst: absent  Sensation: normal    Right Hip Exam     Inspection   Scars: absent  Swelling: absent  Bruising: absent  No deformity of hip.  Quadriceps Atrophy:  Negative  Erythema: absent    Range of Motion   Abduction:  30 normal   Adduction:  20 normal   Extension:  0 normal   Flexion:  90 normal   External rotation:  60 normal   Internal rotation:  5 normal     Tests   Pain w/ forced internal rotation (SAMIA): absent  Pain w/ forced external rotation (FADIR): absent  Alix: negative  Trendelenburg Test: negative  Circumduction test: negative  Stinchfield test: negative  Log Roll: negative  Snapping Hip (internal): negative  Step-down test: negative  Resisted sit-up pain: negative  Unresisted sit-up pain: negative  Resisted sit-up pain with adductor contraction pain: negative    Other   Sensation: decreased    Comments:  SLR + at 45 degrees  Left Hip Exam     Inspection   Scars: absent  Swelling: absent  No deformity of hip.  Quadriceps Atrophy:  negative  Erythema: absent  Bruising: absent    Range of Motion   Abduction:  40 normal   Adduction:  20  normal   Extension:  0 normal   Flexion:  90 normal   External rotation:  60 normal   Internal rotation: 15 normal     Tests   Pain w/ forced internal rotation (SAMIA): absent  Pain w/ forced external rotation (FADIR): absent  Alix: negative  Trendelenburg Test: negative  Circumduction test: negative  Stinchfield test: negative  Log Roll: negative  Snapping Hip (internal): negative  Step-down test: negative  Resisted sit-up pain: negative  Resisted sit-up pain with adductor contraction pain: negative  Unresisted sit-up pain: negative    Other   Sensation: decreased    Comments:  SLR + at 45 degrees      Back (L-Spine & T-Spine) / Neck (C-Spine) Exam   Back exam is normal.  Right Shoulder Exam     Inspection/Observation   Swelling: absent  Bruising: absent  Scars: absent  Deformity: absent  Scapular Winging: absent  Scapular Dyskinesia: negative  Atrophy: absent    Tenderness   The patient is experiencing no tenderness.    Range of Motion   Active abduction:  90 normal   Passive abduction:  100 normal   Extension:  0 normal   Forward Flexion:  180 normal   Forward Elevation: 180 normal  Adduction: 40 normal  External Rotation 0 degrees:  50 normal   External Rotation 90 degrees: 90 normal  Internal rotation 0 degrees:  T8 normal   Internal rotation 90 degrees:  30 normal     Tests & Signs   Apprehension: negative  Cross arm: negative  Drop arm: negative  Curiel test: negative  Impingement: negative  Sulcus: absent  Anterosuperior Escape: negative  Lag Sign 0 degrees: negative  Lag Sign 90 degrees: negative  Lift Off Sign: negative  Belly Press: negative  Active Compression Test ([place]house's Sign): negative  Yergason's Test: negative  Speed's Test: negative  Anterior Drawer Test: 1+   Posterior Drawer Test: 1+  Relocation 90 degrees: negative  Relocation > 90 degrees: negative  Bear Hug: negative  Moving Valgus: negative  Jerk Test: negative    Other   Sensation: normal    Left Shoulder Exam     Inspection/Observation    Swelling: absent  Bruising: absent  Scars: present  Deformity: absent  Scapular Winging: absent  Scapular Dyskinesia: negative  Atrophy: absent    Tenderness   The patient is tender to palpation of the greater tuberosity and biceps tendon.    Range of Motion   Active abduction:  90 abnormal   Passive abduction:  90 abnormal   Extension:  0 normal   Forward Flexion:  140 abnormal   Forward Elevation: 140 abnormal  Adduction: 40 normal  External Rotation 0 degrees:  40 abnormal   External Rotation 90 degrees: 20 abnormal  Internal rotation 0 degrees:  L1 abnormal   Internal rotation 90 degrees:  0 abnormal     Tests & Signs   Apprehension: negative  Cross arm: negative  Drop arm: negative  Curiel test: negative  Impingement: negative  Sulcus: absent  Rotator Cuff Painful Arc/Range: moderate  Anterosuperior Escape: negative  Lag Sign 0 degrees: negative  Lag Sign 90 degrees: negative  Lift Off Sign: negative  Belly Press: negative  Active Compression test ([place]'s Sign): negative  Yergasons's Test: negative  Speed's Test: negative  Anterior Drawer Test: 1+  Posterior Drawer Test: 1+  Relocation 90 degrees: negative  Relocation > 90 degrees: negative  Bear Hug: negative  Moving Valgus: negative  Jerk Test: negative    Other   Sensation: normal     Comments:  Incisions healed    No signs of worsened weakness of the rotator cuff muscles.       Muscle Strength   Right Upper Extremity   Shoulder Abduction: 5/5   Shoulder Internal Rotation: 5/5   Shoulder External Rotation: 5/5   Supraspinatus: 5/5/5   Subscapularis: 5/5/5   Biceps: 5/5/5   Left Upper Extremity  Shoulder Abduction: 5/5   Shoulder Internal Rotation: 5/5   Shoulder External Rotation: 4/5   Supraspinatus: 4/5/5   Subscapularis: 4/5/5   Biceps: 4/5/5   Right Lower Extremity   Hip Abduction: 5/5   Hip Adduction: 5/5   Hip Flexion: 5/5   Left Lower Extremity   Hip Abduction: 5/5   Hip Adduction: 5/5   Hip Flexion: 5/5     Reflexes     Left Side  Biceps:   2+  Triceps:  2+  Brachioradialis:  2+  Quadriceps:  2+  Achilles:  2+    Right Side   Biceps:  2+  Triceps:  2+  Brachioradialis:  2+  Quadriceps:  2+  Achilles:  2+    Vascular Exam     Right Pulses  Dorsalis Pedis:      2+  Posterior Tibial:      2+  Radial:                    2+      Left Pulses  Dorsalis Pedis:      2+  Posterior Tibial:      2+  Radial:                    2+      Capillary Refill  Right Hand: normal capillary refill  Left Hand: normal capillary refill    Edema  Right Upper Leg: absent  Left Upper Leg: absent    RADIOGRAPHS:  Left shoulder:  FINDINGS:  Postoperative changes identified.  Mild DJD.  No fracture or bone destruction    MRI LUMBAR SPINE WITHOUT CONTRAST    CLINICAL HISTORY:  radiculopathy; Pain in left hip    TECHNIQUE:  Multiplanar, multisequence MR images were acquired from the thoracolumbar junction to the sacrum without the administration of contrast.    COMPARISON:  Lumbar spine radiographs 05/18/2017    FINDINGS:  Alignment: Normal.    Vertebrae: Normal marrow signal. No fracture.    Discs: Multilevel disc desiccation, disc height loss, and endplate degenerative changes most prominent at L2-L3.    Cord: Normal.  Conus terminates at L2.    Degenerative findings:    T12-L1: No evidence of neural foraminal narrowing or spinal canal stenosis.    L1-L2: Mild diffuse disc bulge without significant neural foraminal narrowing or spinal canal stenosis.    L2-L3: Diffuse disc bulge and mild bilateral facet arthropathy without significant neural foraminal narrowing or spinal canal stenosis.    L3-L4: Diffuse disc bulge slightly asymmetric to the right, ligamentum flavum buckling, and mild bilateral facet arthropathy resulting in mild right neural foraminal narrowing without significant spinal canal stenosis.    L4-L5: Diffuse disc bulge, ligamentum flavum buckling, and mild bilateral facet arthropathy without significant neural foraminal narrowing or spinal canal stenosis.    L5-S1: Mild  bilateral facet arthropathy without significant neural foraminal narrowing or spinal canal stenosis.    Paraspinal muscles & soft tissues: Unremarkable.      Impression       Mild lumbar spondylosis with mild right neural foraminal narrowing at L3-L4.           Assessment:       Encounter Diagnoses   Name Primary?    Lumbar radiculopathy, acute Yes    Anxiety     Obesity, unspecified classification, unspecified obesity type, unspecified whether serious comorbidity present     Rotator cuff syndrome of left shoulder     Weakness of trunk musculature           Plan:       1. Jeter Hip Score was filled out today in clinic.     RTC with healthy back program and Dr. Thompson for injections after her MRI. Patient will not fill out Jeter Hip Score on return.     2. Referral placed to [facility], Dr. Thompson for injections    3. MRI lumbar spine ordered    4. Referral placed to healthy back program - [facility]    5. Indocin SR 1 month supply ordered    6. Gabapentin 300 mg TID ordered    7. Robaxin 500 mg TID ordered        All of the patient's questions were answered and the patient will contact us if they have any questions or concerns in the interim.      [facility] patient questionnaires have been collected today.

## 2020-05-11 DIAGNOSIS — M75.102 ROTATOR CUFF SYNDROME OF LEFT SHOULDER: Primary | ICD-10-CM

## 2020-05-11 NOTE — TELEPHONE ENCOUNTER
Valium 5 mg sent to pharmacy.    ----- Message from Corine Grier sent at 5/11/2020 12:29 PM CDT -----  Contact: self  Pt has an MRI scheduled for thursday and needs medication before than. Pt states she is claustrophobic.    Contact Info [phone] (home)        back/Left:/breast

## 2020-05-12 RX ORDER — DIAZEPAM 5 MG/1
5 TABLET ORAL EVERY 6 HOURS PRN
Qty: 3 TABLET | Refills: 0 | Status: SHIPPED | OUTPATIENT
Start: 2020-05-12 | End: 2020-06-11

## 2020-05-14 ENCOUNTER — HOSPITAL ENCOUNTER (OUTPATIENT)
Dept: RADIOLOGY | Facility: HOSPITAL | Age: 55
Discharge: HOME | End: 2020-05-14
Attending: ORTHOPAEDIC SURGERY
Payer: MEDICAID

## 2020-05-14 DIAGNOSIS — M54.16 LUMBAR RADICULOPATHY, ACUTE: ICD-10-CM

## 2020-05-14 PROCEDURE — 72148 MRI LUMBAR SPINE W/O DYE: CPT | Mod: TC

## 2020-05-14 PROCEDURE — 72148 MRI LUMBAR SPINE W/O DYE: CPT | Mod: 26,,, | Performed by: RADIOLOGY

## 2020-05-14 PROCEDURE — 72148 MRI LUMBAR SPINE WITHOUT CONTRAST: ICD-10-PCS | Mod: 26,,, | Performed by: RADIOLOGY

## 2020-05-15 DIAGNOSIS — M51.16 LUMBAR DISC DISEASE WITH RADICULOPATHY: Primary | ICD-10-CM

## 2020-05-18 ENCOUNTER — TELEPHONE (OUTPATIENT)
Dept: PAIN MEDICINE | Facility: CLINIC | Age: 55
End: 2020-05-18

## 2020-05-18 DIAGNOSIS — M54.16 LUMBAR RADICULOPATHY: ICD-10-CM

## 2020-05-18 DIAGNOSIS — Z01.812 PRE-PROCEDURE LAB EXAM: Primary | ICD-10-CM

## 2020-05-18 NOTE — TELEPHONE ENCOUNTER
----- Message from Marily Bryant sent at 5/18/2020  9:50 AM CDT -----  Patient scheduled with Dr Thompson on 5/29/20 for procedure.    Please order testing.    Respectfully,  Marily

## 2020-05-18 NOTE — TELEPHONE ENCOUNTER
----- Message from Ten Thompson MD sent at 5/17/2020  3:14 PM CDT -----  Thank you Dr. LONG    My staff  Please schedule R L3 & 4 TESI. Thank you  HE  ----- Message -----  From: Susanna Vann MD  Sent: 5/15/2020   9:19 AM CDT  To: Ten Thompson MD, Harrison Ramos PA-C, #    Proceed with injection by Dr. Thompson with levels per MRI  Healthy back program would be good as well orders sent for both

## 2020-05-18 NOTE — TELEPHONE ENCOUNTER
Called to schedule Cov19 test prior to scheduled procedure, no answer, message stated mailbox was full, unable;le to leave message

## 2020-05-18 NOTE — TELEPHONE ENCOUNTER
----- Message from Jenise Deleon sent at 5/18/2020  9:28 AM CDT -----  Contact: CHRISS LAY [[id number]]  Type:  Patient Returning Call    Who Called: CHRISS LAY [[id number]]    Who Left Message for Patient: Marily Bryant    Does the patient know what this is regarding?:N    Best Call Back Number:[phone]    Additional Information:

## 2020-05-20 ENCOUNTER — TELEPHONE (OUTPATIENT)
Dept: SPORTS MEDICINE | Facility: CLINIC | Age: 55
End: 2020-05-20

## 2020-05-20 ENCOUNTER — OFFICE VISIT (OUTPATIENT)
Dept: SPORTS MEDICINE | Facility: CLINIC | Age: 55
End: 2020-05-20
Payer: MEDICAID

## 2020-05-20 DIAGNOSIS — M47.26 OSTEOARTHRITIS OF SPINE WITH RADICULOPATHY, LUMBAR REGION: Primary | ICD-10-CM

## 2020-05-20 DIAGNOSIS — M48.061 SPINAL STENOSIS OF LUMBAR REGION, UNSPECIFIED WHETHER NEUROGENIC CLAUDICATION PRESENT: ICD-10-CM

## 2020-05-20 PROCEDURE — 99442 PR PHYSICIAN TELEPHONE EVALUATION 11-20 MIN: CPT | Mod: 95,,, | Performed by: ORTHOPAEDIC SURGERY

## 2020-05-20 PROCEDURE — 99442 PR PHYSICIAN TELEPHONE EVALUATION 11-20 MIN: ICD-10-PCS | Mod: 95,,, | Performed by: ORTHOPAEDIC SURGERY

## 2020-05-20 RX ORDER — METHOCARBAMOL 500 MG/1
500 TABLET, FILM COATED ORAL 3 TIMES DAILY
Qty: 90 TABLET | Refills: 2 | Status: SHIPPED | OUTPATIENT
Start: 2020-05-20 | End: 2020-05-30

## 2020-05-20 NOTE — PROGRESS NOTES
Subjective:          Chief Complaint: Lorraine Childers is a 54 y.o. female who had no chief complaint listed for this encounter.    Interval history 5/8/2020:  Patient presents today for 3 weeks of right-sided radiculopathy.  Pain began after house work 3 weeks ago.  She has seen multiple emergency rooms, pain medicine doctor, orthopedist and has been told different diagnoses.  She had taken a prednisone taper about 2 weeks ago to no relief.  NSAIDs are not helping.  No traumatic injury or event.  She has had this issue before with mostly on her left side.  History of MRI concerning for multiple degenerative discs and foraminal stenoses.  No loss of bowel or bladder function.    Previous hx: + numbness to thighs bilaterally. States she has had several episodes of acute back/buttock/lateral thigh pain on the L. Have been so severe she has N/v. Seen in ER x2 and CT abdomen performed which showed no intra-abdominal abnormality. Referred to ortho for management.    As far as her L shoulder she is doing well.    She denies any new injuries, trauma, or falls.     DATE OF PROCEDURE: 4/20/2018     ATTENDING SURGEON: Surgeon(s) and Role:     * Susanna Vann MD - Primary  Assistants:  Juan Luis Smith MD-Fellow  ASIYA Arredondo-LENA-Assistant     PREOPERATIVE DIAGNOSIS:  Left shoulder   Tendinitis, Biceps M75.20 A-C Arthritis M12.9, Rotator Cuff Syndrome/Disorder  M75.100 and Tear, Rotator Cuff, Non-traumatic M75.100     POSTOPERATIVE DIAGNOSIS: Left shoulder   Tendinitis, Biceps M75.20 A-C Arthritis M12.9, Rotator Cuff Syndrome/Disorder  M75.100, Tear, Rotator Cuff, Non-traumatic M75.100 and Tendinitis, Biceps M75.20            PROCEDURES PERFORMED:  1. Left shoulder Arthroscopic rotator cuff repair CPT - 18814     2. Left shoulder Biceps tenodesis CPT - 75209     3. Left shoulder Arthroscopic distal clavicle excision CPT - 08060     4. Left shoulder Amniox Arthrocentesis, 81449    Telemedicine/Virtual Visit  "Documentation:    The patient location is: home    The chief complaint leading to consultation is: see HPI    VISIT TYPE X  Virtual visit with synchronous audio and video   Telephone E/M service     Total time spent with patient: see X devorah on chart below.   More than half of the time was spent counseling or coordinating care including prognosis, differential diagnosis, risks and benefits of treatment, instructions, compliance risk reductions     EST MINUTES X  73091 5   22268 10   73105 15   32871 25   26987 40   NEW    95995 10   74502 20   56070 30   49593 45   31022 60   PHONE     [phone]    [phone]      H&P  Orthopaedics      SUBJECTIVE:    History of Present Illness:  Patient is a 54 y.o. female with hip and lumbar pain.    Review of patient's allergies indicates:   -- Percocet (oxycodone-acetaminophen) -- Nausea Only    --  Can take Percocet if she takes Phenergan or             Zofran.   -- Vicodin (hydrocodone-acetaminophen)     --  "Makes me Hyper." Doesn't help the pain.   -- Zofran (ondansetron hcl) -- Nausea And Vomiting    Past Medical History:  No date: Anxiety  No date: Back problem  No date: Diabetes mellitus type I      Comment:  no meds since weight loss few years back  No date: General anesthetics causing adverse effect in therapeutic use      Comment:  sometimes hard to sedate--  No date: Pneumonia  No date: Thyroid disease      Comment:  thyroid nodules  Past Surgical History:  x2:  SECTION      Comment:   and   No date: HYSTERECTOMY      Comment:  Partial abd  No date: RHINOPLASTY TIP  No date: SHOULDER SURGERY  No date: THYROIDECTOMY, PARTIAL; Right  Review of patient's family history indicates:  Problem: Diabetes      Relation: Mother          Age of Onset: (Not Specified)  Problem: Heart disease      Relation: Mother          Age of Onset: (Not Specified)  Problem: Hyperlipidemia      Relation: Mother          Age of Onset: (Not Specified)  Problem: " Hypertension      Relation: Mother          Age of Onset: (Not Specified)    Social History    Tobacco Use      Smoking status: Current Every Day Smoker        Packs/day: 0.50        Types: Cigarettes      Smokeless tobacco: Never Used    Alcohol use: No    Drug use: No       Review of Systems:  Patient denies constitutional symptoms, cardiac symptoms, respiratory symptoms, GI symptoms.  The remainder of the musculoskeletal ROS is included in the HPI.      OBJECTIVE:    Physical Exam:  Gen:  No acute distress  CV:  Peripherally well-perfused.  Pulses 2+ bilaterally.  Lungs:  Normal respiratory effort.  Abdomen:  Soft, non-tender, non-distended  Head/Neck:  Normocephalic.  Atraumatic. No TTP, AROM and PROM intact without pain  Neuro:  CN intact without deficit, SILT throughout B/L Upper & Lower Extremities    MSK:  See attached    No imaging was obtained for this visit as patient was unable to complete an in clinic visit.    ASSESSMENT/PLAN:    A/P: Lorraine Childers is a 54 y.o. Lumbar DJD, stenosis and neuroforaminal impingement noted    Plan:  -1. Jeter Hip Score was filled out today in clinic.     RTC with healthy back program and Dr. Thompson for injections. Patient will not fill out Jeter Hip Score on return.     2. Referral placed to [facility], Dr. Thompson for injections    3. MRI lumbar spine ordered    4. Referral placed to healthy back program - [facility] Dur[facility]    5. Indocin SR 1 month supply ordered    6. Gabapentin 300 mg TID ordered    7. Robaxin 500 mg TID ordered    8. 12 minute conversation        Handedness: right-handed  Sport played:    Level:            Pain   This is a chronic problem. The current episode started more than 1 month ago. The problem occurs intermittently. The problem has been rapidly worsening. Associated symptoms include neck pain. Pertinent negatives include no chest pain, fever, joint swelling, numbness or rash. The symptoms are aggravated by exertion. The  treatment provided no relief.       Review of Systems   Constitution: Negative for fever and night sweats.   HENT: Negative for hearing loss.    Eyes: Negative for blurred vision and visual disturbance.   Cardiovascular: Negative for chest pain and leg swelling.   Respiratory: Negative for shortness of breath.    Endocrine: Negative for polyuria.   Hematologic/Lymphatic: Negative for bleeding problem.   Skin: Negative for rash.   Musculoskeletal: Positive for back pain, joint pain, neck pain and stiffness. Negative for joint swelling, muscle cramps and muscle weakness.   Gastrointestinal: Negative for melena.   Genitourinary: Negative for hematuria.   Neurological: Positive for paresthesias. Negative for loss of balance and numbness.   Psychiatric/Behavioral: Negative for altered mental status.                   Objective:        General: Lorraine Fonseca is well-developed, well-nourished, appears stated age, in no acute distress, alert and oriented to time, place and person.     General    Vitals reviewed.  Constitutional: She is oriented to person, place, and time. She appears well-developed and well-nourished. No distress.   HENT:   Nose: Nose normal.   Mouth/Throat: No oropharyngeal exudate.   Eyes: Pupils are equal, round, and reactive to light. Right eye exhibits no discharge. Left eye exhibits no discharge.   Neck: Normal range of motion.   Cardiovascular: Normal rate and intact distal pulses.    Pulmonary/Chest: Effort normal and breath sounds normal. No respiratory distress.   Neurological: She is alert and oriented to person, place, and time. She has normal reflexes. She displays normal reflexes. No cranial nerve deficit. Coordination normal.   Psychiatric: She has a normal mood and affect. Her behavior is normal. Judgment and thought content normal.     General Musculoskeletal Exam   Gait: normal   Pelvic Obliquity: none    Right Ankle/Foot Exam     Tests   Heel Walk: unable to perform  Tiptoe Walk: unable  to perform  Single Heel Rise: unable to perform  Double Heel Rise: unable to perform double heel rise    Left Ankle/Foot Exam     Tests   Heel Walk: unable to perform  Tiptoe Walk: unable to perform  Single Heel Rise: unable to perform  Double Heel Rise: unable to perform    Right Knee Exam     Inspection   Alignment:  normal  Erythema: absent  Scars: absent  Swelling: absent  Effusion: absent  Deformity: absent  Bruising: absent    Tenderness   The patient is experiencing no tenderness.     Range of Motion   Extension: 0   Flexion: 140     Tests   Meniscus   Elina:  Medial - negative Lateral - negative  Ligament Examination Lachman: normal (-1 to 2mm) PCL-Posterior Drawer: normal (0 to 2mm)     MCL - Valgus: normal (0 to 2mm)  LCL - Varus: normalPivot Shift: normal (Equal)Reverse Pivot Shift: normal (Equal)Dial Test at 30 degrees: normal (< 5 degrees)Dial Test at 90 degrees: normal (< 5 degrees)  Posterior Sag Test: negative  Posterolateral Corner: unstable (>15 degrees difference)  Patella   Patellar apprehension: negative  Passive Patellar Tilt: neutral  Patellar Tracking: normal  Patellar Glide (quadrants): Lateral - 1   Medial - 2  Q-Angle at 90 degrees: normal  Patellar Grind: negative  J-Sign: none    Other   Meniscal Cyst: absent  Popliteal (Baker's) Cyst: absent  Sensation: normal    Left Knee Exam     Inspection   Alignment:  normal  Erythema: absent  Scars: absent  Swelling: absent  Effusion: absent  Deformity: absent  Bruising: absent    Tenderness   The patient is experiencing no tenderness.     Range of Motion   Extension: 0   Flexion: 140     Tests   Meniscus   Elina:  Medial - negative Lateral - negative  Stability Lachman: normal (-1 to 2mm) PCL-Posterior Drawer: normal (0 to 2mm)  MCL - Valgus: normal (0 to 2mm)  LCL - Varus: normal (0 to 2mm)Pivot Shift: normal (Equal)Reverse Pivot Shift: normal (Equal)Dial Test at 30 degrees: normal (< 5 degrees)Dial Test at 90 degrees: normal (< 5  degrees)  Posterior Sag Test: negative  Posterolateral Corner: unstable (>15 degrees difference)  Patella   Patellar apprehension: negative  Passive Patellar Tilt: neutral  Patellar Tracking: normal  Patellar Glide (Quadrants): Lateral - 1 Medial - 2  Q-Angle at 90 degrees: normal  Patellar Grind: negative  J-Sign: J sign absent    Other   Meniscal Cyst: absent  Popliteal (Baker's) Cyst: absent  Sensation: normal    Right Hip Exam     Inspection   Scars: absent  Swelling: absent  Bruising: absent  No deformity of hip.  Quadriceps Atrophy:  Negative  Erythema: absent    Range of Motion   Abduction:  30 normal   Adduction:  20 normal   Extension:  0 normal   Flexion:  90 normal   External rotation:  60 normal   Internal rotation:  5 normal     Tests   Pain w/ forced internal rotation (SAMIA): absent  Pain w/ forced external rotation (FADIR): absent  Alix: negative  Trendelenburg Test: negative  Circumduction test: negative  Stinchfield test: negative  Log Roll: negative  Snapping Hip (internal): negative  Step-down test: negative  Resisted sit-up pain: negative  Unresisted sit-up pain: negative  Resisted sit-up pain with adductor contraction pain: negative    Other   Sensation: decreased    Comments:  SLR + at 45 degrees  Left Hip Exam     Inspection   Scars: absent  Swelling: absent  No deformity of hip.  Quadriceps Atrophy:  negative  Erythema: absent  Bruising: absent    Range of Motion   Abduction:  40 normal   Adduction:  20 normal   Extension:  0 normal   Flexion:  90 normal   External rotation:  60 normal   Internal rotation: 15 normal     Tests   Pain w/ forced internal rotation (SAMIA): absent  Pain w/ forced external rotation (FADIR): absent  Alix: negative  Trendelenburg Test: negative  Circumduction test: negative  Stinchfield test: negative  Log Roll: negative  Snapping Hip (internal): negative  Step-down test: negative  Resisted sit-up pain: negative  Resisted sit-up pain with adductor contraction pain:  negative  Unresisted sit-up pain: negative    Other   Sensation: decreased    Comments:  SLR + at 45 degrees      Back (L-Spine & T-Spine) / Neck (C-Spine) Exam   Back exam is normal.    Tenderness Right paramedian tenderness of the Lower L-Spine. Left paramedian tenderness of the Lower L-Spine.     Back (L-Spine & T-Spine) Range of Motion   Extension: abnormal   Flexion: abnormal     Spinal Sensation   Right Side Sensation  L-Spine Level: L5 decreased  Left Side Sensation  L-Spine Level: L5 decreased    Back (L-Spine & T-Spine) Tests   Right Side Tests  Squat Test: unable to perform  Left Side Tests  Squat: unable to perform  Right Shoulder Exam     Inspection/Observation   Swelling: absent  Bruising: absent  Scars: absent  Deformity: absent  Scapular Winging: absent  Scapular Dyskinesia: negative  Atrophy: absent    Tenderness   The patient is experiencing no tenderness.    Range of Motion   Active abduction:  90 normal   Passive abduction:  100 normal   Extension:  0 normal   Forward Flexion:  180 normal   Forward Elevation: 180 normal  Adduction: 40 normal  External Rotation 0 degrees:  50 normal   External Rotation 90 degrees: 90 normal  Internal rotation 0 degrees:  T8 normal   Internal rotation 90 degrees:  30 normal     Tests & Signs   Apprehension: negative  Cross arm: negative  Drop arm: negative  Curiel test: negative  Impingement: negative  Sulcus: absent  Anterosuperior Escape: negative  Lag Sign 0 degrees: negative  Lag Sign 90 degrees: negative  Lift Off Sign: negative  Belly Press: negative  Active Compression Test (Brookings's Sign): negative  Yergason's Test: negative  Speed's Test: negative  Anterior Drawer Test: 1+   Posterior Drawer Test: 1+  Relocation 90 degrees: negative  Relocation > 90 degrees: negative  Bear Hug: negative  Moving Valgus: negative  Jerk Test: negative    Other   Sensation: normal    Left Shoulder Exam     Inspection/Observation   Swelling: absent  Bruising: absent  Scars:  present  Deformity: absent  Scapular Winging: absent  Scapular Dyskinesia: negative  Atrophy: absent    Tenderness   The patient is tender to palpation of the greater tuberosity and biceps tendon.    Range of Motion   Active abduction:  90 abnormal   Passive abduction:  90 abnormal   Extension:  0 normal   Forward Flexion:  140 abnormal   Forward Elevation: 140 abnormal  Adduction: 40 normal  External Rotation 0 degrees:  40 abnormal   External Rotation 90 degrees: 20 abnormal  Internal rotation 0 degrees:  L1 abnormal   Internal rotation 90 degrees:  0 abnormal     Tests & Signs   Apprehension: negative  Cross arm: negative  Drop arm: negative  Curiel test: negative  Impingement: negative  Sulcus: absent  Rotator Cuff Painful Arc/Range: moderate  Anterosuperior Escape: negative  Lag Sign 0 degrees: negative  Lag Sign 90 degrees: negative  Lift Off Sign: negative  Belly Press: negative  Active Compression test (Stafford's Sign): negative  Yergasons's Test: negative  Speed's Test: negative  Anterior Drawer Test: 1+  Posterior Drawer Test: 1+  Relocation 90 degrees: negative  Relocation > 90 degrees: negative  Bear Hug: negative  Moving Valgus: negative  Jerk Test: negative    Other   Sensation: normal     Comments:  Incisions healed    No signs of worsened weakness of the rotator cuff muscles.       Muscle Strength   Right Upper Extremity   Shoulder Abduction: 5/5   Shoulder Internal Rotation: 5/5   Shoulder External Rotation: 5/5   Supraspinatus: 5/5/5   Subscapularis: 5/5/5   Biceps: 5/5/5   Left Upper Extremity  Shoulder Abduction: 5/5   Shoulder Internal Rotation: 5/5   Shoulder External Rotation: 4/5   Supraspinatus: 4/5/5   Subscapularis: 4/5/5   Biceps: 4/5/5   Right Lower Extremity   Hip Abduction: 5/5   Hip Adduction: 5/5   Hip Flexion: 5/5   Quadriceps:  5/5   Anterior tibial:  5/5/5  Gastrocsoleus:  5/5/5  EHL:  5/5  Left Lower Extremity   Hip Abduction: 5/5   Hip Adduction: 5/5   Hip Flexion: 5/5    Quadriceps:  5/5   Hamstrin/5   Anterior tibial:  5/5/5   Gastrocsoleus:  5/5/5  EHL:  5/5    Reflexes     Left Side  Biceps:  2+  Triceps:  2+  Brachioradialis:  2+  Quadriceps:  2+  Achilles:  2+    Right Side   Biceps:  2+  Triceps:  2+  Brachioradialis:  2+  Quadriceps:  2+  Achilles:  2+    Vascular Exam     Right Pulses  Dorsalis Pedis:      2+  Posterior Tibial:      2+  Radial:                    2+      Left Pulses  Dorsalis Pedis:      2+  Posterior Tibial:      2+  Radial:                    2+      Capillary Refill  Right Hand: normal capillary refill  Left Hand: normal capillary refill    Edema  Right Upper Leg: absent  Left Upper Leg: absent    RADIOGRAPHS:  Left shoulder:  FINDINGS:  Postoperative changes identified.  Mild DJD.  No fracture or bone destruction    MRI LUMBAR SPINE WITHOUT CONTRAST    CLINICAL HISTORY:  radiculopathy; Pain in left hip    TECHNIQUE:  Multiplanar, multisequence MR images were acquired from the thoracolumbar junction to the sacrum without the administration of contrast.    COMPARISON:  Lumbar spine radiographs 2017    FINDINGS:  Alignment: Normal.    Vertebrae: Normal marrow signal. No fracture.    Discs: Multilevel disc desiccation, disc height loss, and endplate degenerative changes most prominent at L2-L3.    Cord: Normal.  Conus terminates at L2.    Degenerative findings:    T12-L1: No evidence of neural foraminal narrowing or spinal canal stenosis.    L1-L2: Mild diffuse disc bulge without significant neural foraminal narrowing or spinal canal stenosis.    L2-L3: Diffuse disc bulge and mild bilateral facet arthropathy without significant neural foraminal narrowing or spinal canal stenosis.    L3-L4: Diffuse disc bulge slightly asymmetric to the right, ligamentum flavum buckling, and mild bilateral facet arthropathy resulting in mild right neural foraminal narrowing without significant spinal canal stenosis.    L4-L5: Diffuse disc bulge, ligamentum flavum  buckling, and mild bilateral facet arthropathy without significant neural foraminal narrowing or spinal canal stenosis.    L5-S1: Mild bilateral facet arthropathy without significant neural foraminal narrowing or spinal canal stenosis.    Paraspinal muscles & soft tissues: Unremarkable.      Impression       Mild lumbar spondylosis with mild right neural foraminal narrowing at L3-L4.           Assessment:       Encounter Diagnoses   Name Primary?    Osteoarthritis of spine with radiculopathy, lumbar region Yes    Spinal stenosis of lumbar region, unspecified whether neurogenic claudication present           Plan:       1. Jeter Hip Score was filled out today in clinic.     RTC with healthy back program and Dr. Thompson for injections. Patient will not fill out Jeter Hip Score on return.     2. Referral placed to [facility] pain management, Dr. Thompson for injections    3. MRI lumbar spine ordered    4. Referral placed to healthy back program - One[facility] Duran    5. Indocin SR 1 month supply ordered    6. Gabapentin 300 mg TID ordered    7. Robaxin 500 mg TID ordered    8. 12 minute conversation        All of the patient's questions were answered and the patient will contact us if they have any questions or concerns in the interim.      [facility] patient questionnaires have been collected today.

## 2020-05-27 ENCOUNTER — LAB VISIT (OUTPATIENT)
Dept: INTERNAL MEDICINE | Facility: CLINIC | Age: 55
End: 2020-05-27
Payer: MEDICAID

## 2020-05-27 DIAGNOSIS — Z01.818 PREOP TESTING: Primary | ICD-10-CM

## 2020-05-27 DIAGNOSIS — Z01.818 PREOP TESTING: ICD-10-CM

## 2020-05-27 PROCEDURE — U0003 INFECTIOUS AGENT DETECTION BY NUCLEIC ACID (DNA OR RNA); SEVERE ACUTE RESPIRATORY SYNDROME CORONAVIRUS 2 (SARS-COV-2) (CORONAVIRUS DISEASE [COVID-19]), AMPLIFIED PROBE TECHNIQUE, MAKING USE OF HIGH THROUGHPUT TECHNOLOGIES AS DESCRIBED BY CMS-2020-01-R: HCPCS

## 2020-05-28 ENCOUNTER — TELEPHONE (OUTPATIENT)
Dept: PAIN MEDICINE | Facility: CLINIC | Age: 55
End: 2020-05-28

## 2020-05-28 LAB — SARS-COV-2 RNA RESP QL NAA+PROBE: NOT DETECTED

## 2020-05-28 NOTE — TELEPHONE ENCOUNTER
----- Message from Jenise Deleon sent at 5/28/2020 11:06 AM CDT -----  Contact: CHRISS LAY [[id number]]  Name of Who is Calling: CHRISS LAY [[id number]]      What is the request in detail: Pt is calling to speak to staff in regards to questions about her upcoming procedure... Please call to further assist .       Can the clinic reply by [facility]: N      What Number to Call Back if not in [facility]: [phone]

## 2020-05-28 NOTE — TELEPHONE ENCOUNTER
Staff left message for patient to contact clinic to be schedule for covid testing prior to her 5/29/20 procedure.

## 2020-05-28 NOTE — TELEPHONE ENCOUNTER
Patient contacted the office stating that she is requiring IV sedation for her procedure tomorrow. Staff informed her that a message would be forwarded to  and procedure area staff

## 2020-05-29 ENCOUNTER — HOSPITAL ENCOUNTER (OUTPATIENT)
Facility: OTHER | Age: 55
Discharge: HOME | End: 2020-05-29
Attending: ANESTHESIOLOGY | Admitting: ANESTHESIOLOGY
Payer: MEDICAID

## 2020-05-29 VITALS
OXYGEN SATURATION: 94 % | HEART RATE: 70 BPM | RESPIRATION RATE: 16 BRPM | SYSTOLIC BLOOD PRESSURE: 119 MMHG | DIASTOLIC BLOOD PRESSURE: 69 MMHG

## 2020-05-29 DIAGNOSIS — G89.29 CHRONIC PAIN: ICD-10-CM

## 2020-05-29 PROCEDURE — 64484 NJX AA&/STRD TFRM EPI L/S EA: CPT

## 2020-05-29 PROCEDURE — 25000003 PHARM REV CODE 250: Performed by: ANESTHESIOLOGY

## 2020-05-29 PROCEDURE — 64483 NJX AA&/STRD TFRM EPI L/S 1: CPT | Mod: RT,,, | Performed by: ANESTHESIOLOGY

## 2020-05-29 PROCEDURE — 25000003 PHARM REV CODE 250: Performed by: UROLOGY

## 2020-05-29 PROCEDURE — 64483 PR EPIDURAL INJ, ANES/STEROID, TRANSFORAMINAL, LUMB/SACR, SNGL LEVL: ICD-10-PCS | Mod: RT,,, | Performed by: ANESTHESIOLOGY

## 2020-05-29 PROCEDURE — 64484 NJX AA&/STRD TFRM EPI L/S EA: CPT | Mod: RT,,, | Performed by: ANESTHESIOLOGY

## 2020-05-29 PROCEDURE — 63600175 PHARM REV CODE 636 W HCPCS: Performed by: ANESTHESIOLOGY

## 2020-05-29 PROCEDURE — 25500020 PHARM REV CODE 255: Performed by: ANESTHESIOLOGY

## 2020-05-29 PROCEDURE — 64484 PRA INJECT ANES/STEROID FORAMEN LUMBAR/SACRAL W IMG GUIDE ,EA ADD LEVEL: ICD-10-PCS | Mod: RT,,, | Performed by: ANESTHESIOLOGY

## 2020-05-29 PROCEDURE — 64483 NJX AA&/STRD TFRM EPI L/S 1: CPT

## 2020-05-29 RX ORDER — FENTANYL CITRATE 50 UG/ML
INJECTION, SOLUTION INTRAMUSCULAR; INTRAVENOUS
Status: DISCONTINUED | OUTPATIENT
Start: 2020-05-29 | End: 2020-05-29 | Stop reason: HOSPADM

## 2020-05-29 RX ORDER — LIDOCAINE HYDROCHLORIDE 10 MG/ML
INJECTION INFILTRATION; PERINEURAL
Status: DISCONTINUED | OUTPATIENT
Start: 2020-05-29 | End: 2020-05-29 | Stop reason: HOSPADM

## 2020-05-29 RX ORDER — MIDAZOLAM HYDROCHLORIDE 1 MG/ML
INJECTION INTRAMUSCULAR; INTRAVENOUS
Status: DISCONTINUED | OUTPATIENT
Start: 2020-05-29 | End: 2020-05-29 | Stop reason: HOSPADM

## 2020-05-29 RX ORDER — DEXAMETHASONE SODIUM PHOSPHATE 100 MG/10ML
INJECTION INTRAMUSCULAR; INTRAVENOUS
Status: DISCONTINUED | OUTPATIENT
Start: 2020-05-29 | End: 2020-05-29 | Stop reason: HOSPADM

## 2020-05-29 RX ORDER — LIDOCAINE HYDROCHLORIDE 10 MG/ML
INJECTION, SOLUTION EPIDURAL; INFILTRATION; INTRACAUDAL; PERINEURAL
Status: DISCONTINUED | OUTPATIENT
Start: 2020-05-29 | End: 2020-05-29 | Stop reason: HOSPADM

## 2020-05-29 RX ORDER — PROMETHAZINE HYDROCHLORIDE 25 MG/ML
12.5 INJECTION, SOLUTION INTRAMUSCULAR; INTRAVENOUS
Status: COMPLETED | OUTPATIENT
Start: 2020-05-29 | End: 2020-05-29

## 2020-05-29 RX ORDER — SODIUM CHLORIDE 9 MG/ML
500 INJECTION, SOLUTION INTRAVENOUS CONTINUOUS
Status: DISCONTINUED | OUTPATIENT
Start: 2020-05-29 | End: 2020-05-29 | Stop reason: HOSPADM

## 2020-05-29 RX ORDER — PROMETHAZINE HYDROCHLORIDE 25 MG/ML
25 INJECTION, SOLUTION INTRAMUSCULAR; INTRAVENOUS
Status: DISCONTINUED | OUTPATIENT
Start: 2020-05-29 | End: 2020-05-29 | Stop reason: HOSPADM

## 2020-05-29 RX ADMIN — SODIUM CHLORIDE 500 ML: 0.9 INJECTION, SOLUTION INTRAVENOUS at 10:05

## 2020-05-29 RX ADMIN — PROMETHAZINE HYDROCHLORIDE 12.5 MG: 25 INJECTION INTRAMUSCULAR; INTRAVENOUS at 10:05

## 2020-05-29 NOTE — H&P
"HPI  Patient presenting for Procedure(s) (LRB):  INJECTION, STEROID, EPIDURAL, TRANSFORAMINAL APPROACH (Right)     Patient on Anti-coagulation No    No health changes since previous encounter    Past Medical History:   Diagnosis Date    Anxiety     Back problem     Diabetes mellitus type I     no meds since weight loss few years back    General anesthetics causing adverse effect in therapeutic use     sometimes hard to sedate--    Pneumonia     Thyroid disease     thyroid nodules     Past Surgical History:   Procedure Laterality Date     SECTION  x2     and     HYSTERECTOMY      Partial abd    RHINOPLASTY TIP      SHOULDER SURGERY      THYROIDECTOMY, PARTIAL Right      Review of patient's allergies indicates:   Allergen Reactions    Percocet [oxycodone-acetaminophen] Nausea Only     Can take Percocet if she takes Phenergan or Zofran.    Vicodin [hydrocodone-acetaminophen]      "Makes me Hyper." Doesn't help the pain.    Zofran [ondansetron hcl] Nausea And Vomiting      Current Facility-Administered Medications   Medication    0.9%  NaCl infusion    promethazine injection 25 mg       PMHx, PSHx, Allergies, Medications reviewed in epic    ROS negative except pain complaints in HPI    OBJECTIVE:    BP (!) 140/80   LMP 2012   Breastfeeding? No     PHYSICAL EXAMINATION:    GENERAL: Well appearing, in no acute distress, alert and oriented x3.  PSYCH:  Mood and affect appropriate.  SKIN: Skin color, texture, turgor normal, no rashes or lesions which will impact the procedure.  CV: RRR with palpation of the radial artery.  PULM: No evidence of respiratory difficulty, symmetric chest rise. Clear to auscultation.  NEURO: Cranial nerves grossly intact.    Plan:    Proceed with procedure as planned Procedure(s) (LRB):  INJECTION, STEROID, EPIDURAL, TRANSFORAMINAL APPROACH (Right)    Ten Thompson  2020            " This is a recent snapshot of the patient's [facility] medical record.  For current drug dose and complete information and questions, call [phone] or [facility] ([id number])  CSN Number:  [id number]

## 2020-05-29 NOTE — DISCHARGE INSTRUCTIONS

## 2020-05-29 NOTE — OP NOTE
Date of Service: 05/29/2020    PCP: Primary Doctor No    Referring Physician:    Time-out taken to identify patient and procedure side prior to starting the procedure.   I attest that I have reviewed the patient's home medications prior to the procedure and no contraindication have been identified. I  re-evaluated the patient after the patient was positioned for the procedure in the procedure room immediately before the procedural time-out. The vital signs are current and represent the current state of the patient which has not significantly changed since the preprocedure assessment.                                                           PROCEDURE: Right L3/4 & L4/5 transforaminal epidural steroid injection under fluoroscopy    REASON FOR PROCEDURE: Right Lumbar radiculopathy [M54.16]  1. Chronic pain      POSTPROCEDURE DIAGNOSIS:   Lumbar radiculopathy [M54.16]    1. Chronic pain           PHYSICIAN: Ten Thompson MD  ASSISTANTS:David Griffin MD [employer] pain fellow       MEDICATIONS INJECTED:  Preservative-free dexamethasone 10mg, Xylocaine 1% MPF 3-5ml. 3ml per level. Preservative free, sterile normal saline is used to get larger volume as needed.  LOCAL ANESTHETIC INJECTED:  Xylocaine 1% 9ml with Sodium Bicarbonate 1ml. 3ml per site.    SEDATION MEDICATIONS: local/IV sedation: Versed 4 mg and fentanyl 50 mcg IV.  Conscious sedation ordered by MD.  Patient reevaluated and sedation administered by MD and monitored by RN.  Total sedation time was less than 10 min. (See nurse documentation and case log for sedation time)    ESTIMATED BLOOD LOSS:  None.    COMPLICATIONS:  None.    TECHNIQUE:   Laying in a prone position, the patient was prepped and draped in the usual sterile fashion using ChloraPrep and fenestrated drape.  The area to be injected was determined under fluoroscopic guidance.  Local anesthetic was given by raising a wheel and going down to the hub of a 27-gauge 1.25 inch needle.  The 3.5inch 22-gauge  spinal needle was introduced towards the transverse process of all levels as stated above.   The needle was walked medially then hinged into the neural foramen.  Omnipaque was injected to confirm appropriate placement and that there was no vascular runoff.  The medication was then injected after applying negative pressure. The patient tolerated the procedure well.    PAIN BEFORE THE PROCEDURE: 10/10.    PAIN AFTER THE PROCEDURE: 0/10.    The patient was monitored after the procedure.  Patient was given post procedure and discharge instructions to follow at home.  We will see the patient back in two weeks or the patient may call to inform of status. The patient was discharged in a stable condition.

## 2020-05-29 NOTE — DISCHARGE SUMMARY
Discharge Note  Short Stay      SUMMARY     Admit Date: 5/29/2020    Attending Physician: Ten Thompson      Discharge Physician: Ten Thompson      Discharge Date: 5/29/2020 11:16 AM    Procedure(s) (LRB):  INJECTION, STEROID, EPIDURAL, TRANSFORAMINAL APPROACH (Right)    Final Diagnosis: Lumbar radiculopathy [M54.16]    Disposition: Home or self care    Patient Instructions:   Current Discharge Medication List      CONTINUE these medications which have NOT CHANGED    Details   diazePAM (VALIUM) 5 MG tablet Take 1 tablet (5 mg total) by mouth every 6 (six) hours as needed for Anxiety (take one tab 20 minutes prior to MRI).  Qty: 3 tablet, Refills: 0    Associated Diagnoses: Rotator cuff syndrome of left shoulder      esomeprazole (NEXIUM) 40 MG capsule Take 1 capsule (40 mg total) by mouth before breakfast.  Qty: 30 capsule, Refills: 3      !! gabapentin (NEURONTIN) 100 MG capsule Take 1 capsule (100 mg total) by mouth 3 (three) times daily.  Qty: 90 capsule, Refills: 1    Associated Diagnoses: Lumbar radiculopathy, acute; Complete tear of left rotator cuff; Decreased ROM of left shoulder; S/P arthroscopy of left shoulder      !! gabapentin (NEURONTIN) 300 MG capsule Take 1 capsule (300 mg total) by mouth 3 (three) times daily.  Qty: 90 capsule, Refills: 1      indomethacin (INDOCIN SR) 75 mg CpSR CR capsule Take 1 capsule (75 mg total) by mouth 2 (two) times daily.  Qty: 60 capsule, Refills: 0    Associated Diagnoses: Lumbar radiculopathy, acute      LORazepam (ATIVAN) 0.5 MG tablet TAKE 1 TABLET BY MOUTH EVERY 6 HOURS AS NEEDED  Qty: 90 tablet, Refills: 0      methocarbamoL (ROBAXIN) 500 MG Tab Take 1 tablet (500 mg total) by mouth 3 (three) times daily. for 10 days  Qty: 90 tablet, Refills: 2    Associated Diagnoses: Osteoarthritis of spine with radiculopathy, lumbar region; Spinal stenosis of lumbar region, unspecified whether neurogenic claudication present       !! - Potential duplicate medications found. Please  discuss with provider.              Discharge Diagnosis: Lumbar radiculopathy [M54.16]  Condition on Discharge: Stable with no complications to procedure   Diet on Discharge: Same as before.  Activity: as per instruction sheet.  Discharge to: Home with a responsible adult.  Follow up: 2-4 weeks       Please call my office or pager at [phone] if experienced any weakness or loss of sensation, fever > 101.5, pain uncontrolled with oral medications, persistent nausea/vomiting/or diarrhea, redness or drainage from the incisions, or any other worrisome concerns. If physician on call was not reached or could not communicate with our office for any reason please go to the nearest emergency department

## 2020-06-08 ENCOUNTER — TELEPHONE (OUTPATIENT)
Dept: PAIN MEDICINE | Facility: CLINIC | Age: 55
End: 2020-06-08

## 2020-06-08 DIAGNOSIS — M54.16 LUMBAR RADICULOPATHY, ACUTE: ICD-10-CM

## 2020-06-08 NOTE — TELEPHONE ENCOUNTER
----- Message from Mercedes Wang sent at 6/8/2020 11:26 AM CDT -----  Contact: CHRISS LAY [[id number]]  Name of Who is Calling: CHRISS LAY [[id number]]      What is the request in detail:LAYCHRISS LUCIANA [[id number]] is still in  Pain after procedure ...  Please contact to further discuss and advise      Can the clinic reply by [facility]: no     [facility]  [phone] (home)

## 2020-06-09 ENCOUNTER — TELEPHONE (OUTPATIENT)
Dept: SPORTS MEDICINE | Facility: CLINIC | Age: 55
End: 2020-06-09

## 2020-06-09 ENCOUNTER — TELEPHONE (OUTPATIENT)
Dept: PAIN MEDICINE | Facility: CLINIC | Age: 55
End: 2020-06-09

## 2020-06-09 DIAGNOSIS — M54.9 BACK PAIN, UNSPECIFIED BACK LOCATION, UNSPECIFIED BACK PAIN LATERALITY, UNSPECIFIED CHRONICITY: Primary | ICD-10-CM

## 2020-06-09 RX ORDER — INDOMETHACIN 75 MG/1
CAPSULE, EXTENDED RELEASE ORAL
Qty: 60 CAPSULE | Refills: 0 | Status: SHIPPED | OUTPATIENT
Start: 2020-06-09 | End: 2020-07-06

## 2020-06-09 NOTE — TELEPHONE ENCOUNTER
The patient was contacted regarding their call to the office earlier and a voice mail was left to call the office back at their convenience.    ----- Message from Aliyah Hodges sent at 6/9/2020 10:33 AM CDT -----  Contact: self @ [phone]  Pt says Dr Vann referred her to Dr Thompson for a procedure.  Pt says it did not work.  Pt was advised to f/u with Dr Vann.  Pls call.

## 2020-06-09 NOTE — TELEPHONE ENCOUNTER
LVM for patient requested a call back.       Referral for Back and Spine sent to providers for signature.

## 2020-06-09 NOTE — TELEPHONE ENCOUNTER
Staff left a detail message for patient informing her they have reviewed her chart and see that she has never seen Pain Provider Dr. Ten Thompson MD in clinic and was a Direct Referral from Dr. Susanna Vann MD clinic.    Patient should follow up with referring provider to discuss other treatment options/follow up with Dr. Vann.

## 2020-06-09 NOTE — TELEPHONE ENCOUNTER
----- Message from Mercedes Wang sent at 6/9/2020  9:44 AM CDT -----  Contact: CHRISS LAY [[id number]]  Name of Who is Calling: CHRISS LAY [[id number]]    What is the request in detail: LAYCHRISS LUCIANA [[id number]] is in extreme pain after procedure ... Please contact to further discuss and advise      Can the clinic reply by [facility]: no     What [facility] Back [facility]  [phone] (home)

## 2020-06-09 NOTE — TELEPHONE ENCOUNTER
Spoke to patient message below ans informed the patient that a referral has been placed for the back and spine clinic. I provided the patient with clinic's contact information.      ----- Message from Aliyah Hodges sent at 6/9/2020  2:52 PM CDT -----  Contact: self @ [phone]  Pt says she is returning Cara's call.

## 2020-06-11 ENCOUNTER — NURSE TRIAGE (OUTPATIENT)
Dept: ADMINISTRATIVE | Facility: CLINIC | Age: 55
End: 2020-06-11

## 2020-06-11 NOTE — TELEPHONE ENCOUNTER
Attempted to contact pt on behalf of Post Procedural Symptom Tracker. No answer. Someone will call pt back tomorrow.     Reason for Disposition   Message left on identified voicemail    Additional Information   Negative: Caller has already spoken with the PCP (or office), and has no further questions   Negative: Caller has already spoken with another triager and has no further questions   Negative: Caller has already spoken with another triager or PCP (or office), and has further questions and triager able to answer questions.   Negative: Busy signal.  First attempt to contact caller.  Follow-up call scheduled within 15 minutes.   Negative: No answer.  First attempt to contact caller.  Follow-up call scheduled within 15 minutes.    Protocols used: NO CONTACT OR DUPLICATE CONTACT CALL-A-[place]

## 2020-06-12 NOTE — TELEPHONE ENCOUNTER
Attempted to contact patient on behalf of [facility] post procedure system tracker  No contact  Left message

## 2020-06-12 NOTE — TELEPHONE ENCOUNTER
Reason for Disposition   Message left on identified voice mail    Additional Information   Negative: Caller is angry or rude (e.g., hangs up, verbally abusive, yelling)   Negative: Caller hangs up   Negative: Caller has already spoken with the PCP and has no further questions.   Negative: Caller has already spoken with another triager and has no further questions.   Negative: Caller has already spoken with another triager or PCP AND has further questions AND triager able to answer questions.    Protocols used: NO CONTACT OR DUPLICATE CONTACT CALL-A-

## 2020-07-14 ENCOUNTER — HOSPITAL ENCOUNTER (EMERGENCY)
Facility: HOSPITAL | Age: 55
Discharge: HOME | End: 2020-07-15
Attending: EMERGENCY MEDICINE
Payer: MEDICAID

## 2020-07-14 DIAGNOSIS — S91.319A FOOT LACERATION: ICD-10-CM

## 2020-07-14 PROCEDURE — 99283 EMERGENCY DEPT VISIT LOW MDM: CPT | Mod: 25

## 2020-07-14 RX ORDER — CLONAZEPAM 0.5 MG/1
0.5 TABLET ORAL 2 TIMES DAILY PRN
COMMUNITY

## 2020-07-14 RX ORDER — ESCITALOPRAM OXALATE 20 MG/1
30 TABLET ORAL DAILY
COMMUNITY

## 2020-07-15 VITALS
DIASTOLIC BLOOD PRESSURE: 67 MMHG | HEIGHT: 67 IN | RESPIRATION RATE: 16 BRPM | WEIGHT: 205 LBS | OXYGEN SATURATION: 97 % | HEART RATE: 67 BPM | SYSTOLIC BLOOD PRESSURE: 110 MMHG | BODY MASS INDEX: 32.18 KG/M2 | TEMPERATURE: 98 F

## 2020-07-15 PROCEDURE — 90471 IMMUNIZATION ADMIN: CPT | Performed by: EMERGENCY MEDICINE

## 2020-07-15 PROCEDURE — 63600175 PHARM REV CODE 636 W HCPCS: Performed by: EMERGENCY MEDICINE

## 2020-07-15 PROCEDURE — 90715 TDAP VACCINE 7 YRS/> IM: CPT | Performed by: EMERGENCY MEDICINE

## 2020-07-15 RX ADMIN — CLOSTRIDIUM TETANI TOXOID ANTIGEN (FORMALDEHYDE INACTIVATED), CORYNEBACTERIUM DIPHTHERIAE TOXOID ANTIGEN (FORMALDEHYDE INACTIVATED), BORDETELLA PERTUSSIS TOXOID ANTIGEN (GLUTARALDEHYDE INACTIVATED), BORDETELLA PERTUSSIS FILAMENTOUS HEMAGGLUTININ ANTIGEN (FORMALDEHYDE INACTIVATED), BORDETELLA PERTUSSIS PERTACTIN ANTIGEN, AND BORDETELLA PERTUSSIS FIMBRIAE 2/3 ANTIGEN 0.5 ML: 5; 2; 2.5; 5; 3; 5 INJECTION, SUSPENSION INTRAMUSCULAR at 01:07

## 2020-07-15 NOTE — ED TRIAGE NOTES
"Pt. Presents to the ED with c/o laceration to left foot. States she dropped a piece of glass in the kitchen and thought she had picked it all up until she realized she was bleeding " a lot" from the site. Reports feeling like something is still in it. States site only feels achey and sore with 1/10. " I just want to make sure there is no glass in to and if I need stitches or not.    Reports slight loss of sensation to toes that started a few years ago.     Denies taking meds PTA.  "

## 2020-07-15 NOTE — DISCHARGE INSTRUCTIONS
You were seen in the emergency department for a laceration to your heel.  We washed the wound and were able to close it using steristrips.  Do not get it wet for 24 hr.  After this, it is okay to get wet lightly, but do not submerge it or place in a bath.  Please follow up with a provider in 1 week for suture removal and wound check.  Please return for any new or worsening redness, pain, swelling, purulent discharge, fevers, chills, numbness, weakness or other concerns.

## 2020-07-15 NOTE — ED PROVIDER NOTES
"Encounter Date: 2020    SCRIBE #1 NOTE: I, Seamus Reynoso, am scribing for, and in the presence of, Darek Garcia MD.       History     Chief Complaint   Patient presents with    Foot Injury     Patient c/o laceration to bottom of left heal secondary to stepping on a piece of glass x 45 mins.  Reports that she still feels the glass in it.  Bandage placed on at triage and bleeding controlled.  Reports las TD > 10 years.     Lorraine Childers is a 54 year old female who presents to the Emergency Department with a laceration to the bottom of her left foot. She reports breaking a glass and stepping on a piece of glass 45 minutes prior to arrival. She reports that she wants to get it checked out because she states that she was bleeding a lot. The patient also reports concern for having retained glass in the wound. She denies any other complaints at this time.     The history is provided by the patient. No  was used.     Review of patient's allergies indicates:   Allergen Reactions    Percocet [oxycodone-acetaminophen] Nausea Only     Can take Percocet if she takes Phenergan or Zofran.    Vicodin [hydrocodone-acetaminophen]      "Makes me Hyper." Doesn't help the pain.    Zofran [ondansetron hcl] Nausea And Vomiting     Past Medical History:   Diagnosis Date    Anxiety     Back problem     Diabetes mellitus type I     no meds since weight loss few years back    General anesthetics causing adverse effect in therapeutic use     sometimes hard to sedate--    Pneumonia     Thyroid disease     thyroid nodules     Past Surgical History:   Procedure Laterality Date     SECTION  x2     and     HYSTERECTOMY      Partial abd    RHINOPLASTY TIP      SHOULDER SURGERY      THYROIDECTOMY, PARTIAL Right     TRANSFORAMINAL EPIDURAL INJECTION OF STEROID Right 2020    Procedure: INJECTION, STEROID, EPIDURAL, TRANSFORAMINAL APPROACH;  Surgeon: Ten Thompson MD;  [facility] PAIN [facility];  Service: Pain Management;  Laterality: Right;  R TF HAYDEE L3 and L4     Family History   Problem Relation Age of Onset    Diabetes Mother     Heart disease Mother     Hyperlipidemia Mother     Hypertension Mother      Social History     Tobacco Use    Smoking status: Current Every Day Smoker     Packs/day: 0.50     Types: Cigarettes    Smokeless tobacco: Never Used   Substance Use Topics    Alcohol use: No    Drug use: No     Review of Systems   Constitutional: Negative for chills and fever.   Musculoskeletal: Positive for arthralgias and gait problem.   Skin: Positive for wound.   Neurological: Negative for dizziness and syncope.       Physical Exam     Initial Vitals [07/14/20 2246]   BP Pulse Resp Temp SpO2   139/85 73 20 97.9 °F (36.6 °C) 97 %      MAP       --         Physical Exam    Nursing note and vitals reviewed.  Constitutional: She appears well-developed and well-nourished. She is not diaphoretic. No distress.   HENT:   Head: Normocephalic and atraumatic.   Right Ear: External ear normal.   Left Ear: External ear normal.   Mouth/Throat: No oropharyngeal exudate.   Eyes: EOM are normal. Pupils are equal, round, and reactive to light. Right eye exhibits no discharge. Left eye exhibits no discharge.   Neck: No JVD present.   Cardiovascular: Normal rate and intact distal pulses.   Pulmonary/Chest: No respiratory distress.   Abdominal: She exhibits no distension. There is no guarding.   Musculoskeletal: No edema.   Neurological: She is alert and oriented to person, place, and time. GCS score is 15. GCS eye subscore is 4. GCS verbal subscore is 5. GCS motor subscore is 6.   Skin: Skin is warm and dry.   2x0.1x0.2cm deep linear laceration just deep to bleeding layer   Psychiatric: She has a normal mood and affect. Her behavior is normal.         ED Course   Lac Repair    Date/Time: 7/16/2020 10:04 AM  Performed by: Darek Garcia MD  Authorized by: Darek Garcia MD   Consent Done:  Yes  Consent: Verbal consent obtained.  Consent given by: patient  Patient identity confirmed: MRN and verbally with patient  Body area: lower extremity  Location details: left foot  Foreign bodies: no foreign bodies  Tendon involvement: none  Nerve involvement: none  Vascular damage: no  Patient sedated: no  Irrigation solution: saline  Irrigation method: syringe  Amount of cleaning: standard  Debridement: none  Degree of undermining: none  Skin closure: Steri-Strips  Comments: The wound was explored in a clean and bloodless field to the base without evidence of vascular injury, neurologic injury, foreign body, or contamination prior to being closed.          Labs Reviewed - No data to display       Imaging Results          X-Ray Foot Complete Left (Final result)  Result time 07/14/20 23:46:44    Final result by Jean Proctor MD (07/14/20 23:46:44)                 Impression:      No acute osseous abnormality identified.      Electronically signed by: Jean Proctor MD  Date:    07/14/2020  Time:    23:46             Narrative:    EXAMINATION:  XR FOOT COMPLETE 3 VIEW LEFT    CLINICAL HISTORY:  .  Laceration without foreign body, unspecified foot, initial encounter    TECHNIQUE:  AP, lateral and oblique views of the left foot were performed.    COMPARISON:  None    FINDINGS:  No evidence of acute displaced fracture, dislocation, or osseous destructive process.  Lisfranc articulation is congruent.  No radiopaque retained foreign body seen.                                 Medical Decision Making:   Initial Assessment:   55 yo female p/w laceration to bottom of heel due to glass. Wound examined and irrigated thoroughly. No retained foreign body. Minimal depth, into bleeding layer though relatively minor. Well approximated. Good closure with steristrips, given minimal depth, sutures not felt necessary. Okay for DC home. Discussed return precautions and followup.            Scribe Attestation:   Scribe #1: I  performed the above scribed service and the documentation accurately describes the services I performed. I attest to the accuracy of the note.                          Clinical Impression:       ICD-10-CM ICD-9-CM   1. Foot laceration  S91.319A 892.0         Disposition:   Disposition: Discharged  Condition: Stable     ED Disposition Condition    Discharge Stable        ED Prescriptions     None        Follow-up Information    None                       I, Darek Garcia, personally performed the services described in this documentation. All medical record entries made by the scribe were at my direction and in my presence. I have reviewed the chart and agree that the record reflects my personal performance and is accurate and complete.       Darek Garcia MD  07/16/20 1007

## 2021-02-02 ENCOUNTER — PATIENT MESSAGE (OUTPATIENT)
Dept: PSYCHIATRY | Facility: CLINIC | Age: 56
End: 2021-02-02

## 2022-09-13 NOTE — ED PROVIDER NOTES
"Encounter Date: 2017       History     Chief Complaint   Patient presents with    Shoulder Injury     tripped over bush this morning while getting out of car; fell on left side, complains of left shoulder pain, reports scheduled for rotator cuff on  and states reinjured arm this morning     Chief complaint: Shoulder injury    History of present illness: Patient's 52-year-old female who presents for emergent consideration of left shoulder pain following a fall.  States she was getting out of a car when she fell onto her left shoulder.  Denies injury of any other body parts.  Did not hit head, no loss consciousness.  She reports that she has a history of left rotator cuff tear for which she is undergoing treatment by orthopedics at [facility].  She was the pain today is constant and aching.  Used ibuprofen which did not help.  She has reports the pain radiates to the humerus which she finds is swollen.  Current severity pain is 8/10.      The history is provided by the patient. No  was used.     Review of patient's allergies indicates:   Allergen Reactions    Vicodin [hydrocodone-acetaminophen] Other (See Comments)     "Makes me Hyper." Doesn't help the pain.    Percocet [oxycodone-acetaminophen] Nausea Only     Can take Percocet if she takes Phenergan or Zofran.     Past Medical History:   Diagnosis Date    Anxiety     Back problem     Diabetes mellitus type I     no meds since weight loss few years back    General anesthetics causing adverse effect in therapeutic use     sometimes hard to sedate--    Pneumonia     Thyroid disease     thyroid nodules     Past Surgical History:   Procedure Laterality Date     SECTION  x2     and     HYSTERECTOMY      Partial abd    RHINOPLASTY TIP       Family History   Problem Relation Age of Onset    Diabetes Mother     Heart disease Mother     Hyperlipidemia Mother     Hypertension Mother      Social " History   Substance Use Topics    Smoking status: Current Every Day Smoker     Packs/day: 1.00     Types: Cigarettes    Smokeless tobacco: Never Used    Alcohol use No     Review of Systems   Constitutional: Negative for chills, fatigue and fever.   HENT: Negative for congestion, ear discharge, ear pain, postnasal drip, rhinorrhea, sinus pressure, sneezing, sore throat and voice change.    Eyes: Negative for discharge and itching.   Respiratory: Negative for cough, shortness of breath and wheezing.    Cardiovascular: Negative for chest pain, palpitations and leg swelling.   Gastrointestinal: Negative for abdominal pain, constipation, diarrhea, nausea and vomiting.   Endocrine: Negative for polydipsia, polyphagia and polyuria.   Genitourinary: Negative for dysuria, frequency, hematuria, urgency, vaginal bleeding, vaginal discharge and vaginal pain.   Musculoskeletal: Positive for arthralgias and myalgias.   Skin: Negative for rash and wound.   Neurological: Negative for dizziness, seizures, syncope, weakness and numbness.   Hematological: Negative for adenopathy. Does not bruise/bleed easily.   Psychiatric/Behavioral: Negative for self-injury and suicidal ideas. The patient is not nervous/anxious.        Physical Exam     Initial Vitals [12/25/17 0945]   BP Pulse Resp Temp SpO2   139/79 73 20 96.8 °F (36 °C) 99 %      MAP       99         Physical Exam    Nursing note and vitals reviewed.  Constitutional: She appears well-developed and well-nourished.   HENT:   Head: Normocephalic and atraumatic.   Right Ear: External ear normal.   Left Ear: External ear normal.   Nose: Nose normal.   Eyes: Conjunctivae and EOM are normal. Pupils are equal, round, and reactive to light. Right eye exhibits no discharge. Left eye exhibits no discharge.   Neck: Normal range of motion.   Abdominal: She exhibits no distension.   Musculoskeletal:        Left shoulder: She exhibits decreased range of motion and pain. She exhibits no  tenderness, no bony tenderness, no swelling, no effusion, no crepitus, no deformity, no laceration, no spasm, normal pulse and normal strength.        Left elbow: She exhibits decreased range of motion. She exhibits no swelling, no effusion, no deformity and no laceration. Tenderness found. Lateral epicondyle tenderness noted. No radial head, no medial epicondyle and no olecranon process tenderness noted.   Neurological: She is alert and oriented to person, place, and time.   Skin: Skin is dry. Capillary refill takes less than 2 seconds.         ED Course   Procedures  Labs Reviewed - No data to display          Medical Decision Making:   Differential Diagnosis:   Differential diagnosis includes but is not limited to fracture, dislocation, septic joint, arthritis, rotator cuff damage.                     ED Course as of Dec 25 1743   Mon Dec 25, 2017   1004 BP: 139/79 [VC]   1004 Temp: 96.8 °F (36 °C) [VC]   1004 Pulse: 73 [VC]   1004 Resp: 20 [VC]   1004 Quick look note reviewed.  VS normal. Awaiting triage note. SpO2: 99 % [VC]   1030 Unremarkable examination. X-Ray Shoulder 2 or More Views Left [VC]   1039 Initial assessment: 52-year-old female with a history of a left rotator cuff tear who reports that she fell today while crossing through bushes and reinjured her left shoulders.  States that is constant and aching.  The pain radiates downwards through the humerus and the lateral left elbow is also painful.  Reports no numbness to the left index finger.  Reports left neck soreness.Visible assessment: 52-year-old female who is afebrile and nontoxic.  Observed full range of motion of the neck.  No bony tenderness to the clavicle.  Limited range of motion of the left shoulder in abduction, flexion and extension.  Elbow with full range of motion.  Distal radial pulse is intact.  Skin is warm dry and intact without discoloration.Plan: X-ray of the shoulder humerus and elbow on the left, morphine 4mg im for pain,  norflex 30mg im.  [VC]   1048 No acute displaced fracture identified. X-Ray Elbow Complete Left [VC]   1048 Plan: ace wrap elbow, sling for comfort, anti-inflammatories, muscle relaxants, f/u with Dr. Gray at [facility].  [VC]      ED Course User Index  [VC] Benjamín Aparicio DNP     Clinical Impression:   The primary encounter diagnosis was Left arm pain. A diagnosis of Fall was also pertinent to this visit.    Disposition:   Disposition: Discharged  Condition: Stable  Plan:   flexeril 10mg po tid prn pain, clinoril 150mg po bid, Use sling for comfort, maintain Ace wrap around elbow. Do not take medications prescribed for 8 hours following discharge from emergency Department. Medications given may cause drowsiness do not drive or operate heavy machinery while taking these medications. , f/u with [facility] orthopedic Dr. Royal Benjamín Aparicio DNP  12/25/17 1332     awake/alert/oriented to person, place, time/situation

## 2022-10-28 ENCOUNTER — HOSPITAL ENCOUNTER (EMERGENCY)
Facility: HOSPITAL | Age: 57
Discharge: HOME | End: 2022-10-29
Attending: EMERGENCY MEDICINE
Payer: MEDICAID

## 2022-10-28 VITALS
DIASTOLIC BLOOD PRESSURE: 71 MMHG | HEART RATE: 62 BPM | HEIGHT: 67 IN | SYSTOLIC BLOOD PRESSURE: 128 MMHG | WEIGHT: 196 LBS | BODY MASS INDEX: 30.76 KG/M2 | RESPIRATION RATE: 16 BRPM | OXYGEN SATURATION: 97 % | TEMPERATURE: 98 F

## 2022-10-28 DIAGNOSIS — M79.621 PAIN IN RIGHT UPPER ARM: ICD-10-CM

## 2022-10-28 DIAGNOSIS — R07.9 RIGHT-SIDED CHEST PAIN: Primary | ICD-10-CM

## 2022-10-28 DIAGNOSIS — G44.209 TENSION HEADACHE: ICD-10-CM

## 2022-10-28 LAB
ALBUMIN SERPL BCP-MCNC: 4 G/DL (ref 3.5–5.2)
ALP SERPL-CCNC: 84 U/L (ref 55–135)
ALT SERPL W/O P-5'-P-CCNC: 17 U/L (ref 10–44)
ANION GAP SERPL CALC-SCNC: 7 MMOL/L (ref 8–16)
AST SERPL-CCNC: 13 U/L (ref 10–40)
BILIRUB SERPL-MCNC: 0.3 MG/DL (ref 0.1–1)
BUN SERPL-MCNC: 16 MG/DL (ref 6–20)
CALCIUM SERPL-MCNC: 9 MG/DL (ref 8.7–10.5)
CHLORIDE SERPL-SCNC: 107 MMOL/L (ref 95–110)
CO2 SERPL-SCNC: 25 MMOL/L (ref 23–29)
CREAT SERPL-MCNC: 0.7 MG/DL (ref 0.5–1.4)
EST. GFR  (NO RACE VARIABLE): >60 ML/MIN/1.73 M^2
GLUCOSE SERPL-MCNC: 103 MG/DL (ref 70–110)
POTASSIUM SERPL-SCNC: 4 MMOL/L (ref 3.5–5.1)
PROT SERPL-MCNC: 7.1 G/DL (ref 6–8.4)
SODIUM SERPL-SCNC: 139 MMOL/L (ref 136–145)
TROPONIN I SERPL DL<=0.01 NG/ML-MCNC: <0.006 NG/ML (ref 0–0.03)

## 2022-10-28 PROCEDURE — 93005 ELECTROCARDIOGRAM TRACING: CPT

## 2022-10-28 PROCEDURE — 99285 EMERGENCY DEPT VISIT HI MDM: CPT | Mod: 25

## 2022-10-28 PROCEDURE — 83880 ASSAY OF NATRIURETIC PEPTIDE: CPT | Performed by: PHYSICIAN ASSISTANT

## 2022-10-28 PROCEDURE — 96374 THER/PROPH/DIAG INJ IV PUSH: CPT

## 2022-10-28 PROCEDURE — 84484 ASSAY OF TROPONIN QUANT: CPT | Performed by: PHYSICIAN ASSISTANT

## 2022-10-28 PROCEDURE — 93010 ELECTROCARDIOGRAM REPORT: CPT | Mod: ,,, | Performed by: INTERNAL MEDICINE

## 2022-10-28 PROCEDURE — 80053 COMPREHEN METABOLIC PANEL: CPT | Performed by: PHYSICIAN ASSISTANT

## 2022-10-28 PROCEDURE — 93010 EKG 12-LEAD: ICD-10-PCS | Mod: ,,, | Performed by: INTERNAL MEDICINE

## 2022-10-28 PROCEDURE — 85025 COMPLETE CBC W/AUTO DIFF WBC: CPT | Performed by: PHYSICIAN ASSISTANT

## 2022-10-29 LAB
BASOPHILS # BLD AUTO: 0.06 K/UL (ref 0–0.2)
BASOPHILS NFR BLD: 0.9 % (ref 0–1.9)
BNP SERPL-MCNC: <10 PG/ML (ref 0–99)
DIFFERENTIAL METHOD: ABNORMAL
EOSINOPHIL # BLD AUTO: 0.2 K/UL (ref 0–0.5)
EOSINOPHIL NFR BLD: 2.8 % (ref 0–8)
ERYTHROCYTE [DISTWIDTH] IN BLOOD BY AUTOMATED COUNT: 13.7 % (ref 11.5–14.5)
HCT VFR BLD AUTO: 39.5 % (ref 37–48.5)
HGB BLD-MCNC: 13.6 G/DL (ref 12–16)
IMM GRANULOCYTES # BLD AUTO: 0.02 K/UL (ref 0–0.04)
IMM GRANULOCYTES NFR BLD AUTO: 0.3 % (ref 0–0.5)
LYMPHOCYTES # BLD AUTO: 1.8 K/UL (ref 1–4.8)
LYMPHOCYTES NFR BLD: 27 % (ref 18–48)
MCH RBC QN AUTO: 30.4 PG (ref 27–31)
MCHC RBC AUTO-ENTMCNC: 34.4 G/DL (ref 32–36)
MCV RBC AUTO: 88 FL (ref 82–98)
MONOCYTES # BLD AUTO: 0.4 K/UL (ref 0.3–1)
MONOCYTES NFR BLD: 5.7 % (ref 4–15)
NEUTROPHILS # BLD AUTO: 4.3 K/UL (ref 1.8–7.7)
NEUTROPHILS NFR BLD: 63.3 % (ref 38–73)
NRBC BLD-RTO: 0 /100 WBC
PLATELET # BLD AUTO: 233 K/UL (ref 150–450)
PMV BLD AUTO: 8.9 FL (ref 9.2–12.9)
RBC # BLD AUTO: 4.48 M/UL (ref 4–5.4)
WBC # BLD AUTO: 6.81 K/UL (ref 3.9–12.7)

## 2022-10-29 PROCEDURE — 63600175 PHARM REV CODE 636 W HCPCS: Performed by: EMERGENCY MEDICINE

## 2022-10-29 RX ORDER — KETOROLAC TROMETHAMINE 30 MG/ML
15 INJECTION, SOLUTION INTRAMUSCULAR; INTRAVENOUS
Status: COMPLETED | OUTPATIENT
Start: 2022-10-29 | End: 2022-10-29

## 2022-10-29 RX ADMIN — KETOROLAC TROMETHAMINE 15 MG: 30 INJECTION, SOLUTION INTRAMUSCULAR at 12:10

## 2022-10-29 NOTE — ED TRIAGE NOTES
Pt states she had pain in right side/ chest that lasted for about 20 min while riding in car load of girls

## 2022-10-29 NOTE — ED PROVIDER NOTES
"Encounter Date: 10/28/2022       History     Chief Complaint   Patient presents with    Chest Pain    Shortness of Breath    Headache     Pt presents to ED c/o right sided cp and radiates to right arm x 20 mins pta.  Pt also report sob, nausea and ha.  Denies any other symptoms.  Pain 7/10.      The patient is a 56-year-old with the below past medical history who suddenly developed pain to the right side of her chest and to her right upper arm approximately 30 minutes prior to arrival.  The symptoms began while she was driving.  There was severe at onset and gradually improved.  They have not resolved.  She complains of shortness of breath for the past two days.  She denies cough and palpitations.  She denies headache, lightheadedness, dizziness, nausea, vomiting, and diaphoresis.  She is a smoker.  She has a history of right-sided pneumothorax as result of excision of a thymoma.  She denies history of cardiovascular disease but reports a strong family history of it (both parents).  There are no exacerbating or alleviating factors for her pain.  She did not take analgesics before ED arrival.    The history is provided by the patient. No  was used.   Review of patient's allergies indicates:   Allergen Reactions    Percocet [oxycodone-acetaminophen] Nausea Only     Can take Percocet if she takes Phenergan or Zofran.    Vicodin [hydrocodone-acetaminophen]      "Makes me Hyper." Doesn't help the pain.    Zofran [ondansetron hcl] Nausea And Vomiting     Past Medical History:   Diagnosis Date    Anxiety     Back problem     Diabetes mellitus type I     no meds since weight loss few years back    General anesthetics causing adverse effect in therapeutic use     sometimes hard to sedate--    Pneumonia     Thyroid disease     thyroid nodules     Past Surgical History:   Procedure Laterality Date     SECTION  x2     and     HYSTERECTOMY      Partial abd    RHINOPLASTY TIP      SHOULDER " SURGERY      THYROIDECTOMY, PARTIAL Right     TRANSFORAMINAL EPIDURAL INJECTION OF STEROID Right 5/29/2020    Procedure: INJECTION, STEROID, EPIDURAL, TRANSFORAMINAL APPROACH;  Surgeon: Ten Thompson MD;  Location: [facility];  Service: Pain Management;  Laterality: Right;  R TF HAYDEE L3 and L4     Family History   Problem Relation Age of Onset    Diabetes Mother     Heart disease Mother     Hyperlipidemia Mother     Hypertension Mother      Social History     Tobacco Use    Smoking status: Every Day     Packs/day: 0.50     Types: Cigarettes    Smokeless tobacco: Never   Substance Use Topics    Alcohol use: No    Drug use: No     Review of Systems   Constitutional:  Negative for chills, fatigue and fever.   HENT:  Negative for congestion and rhinorrhea.    Eyes:  Negative for visual disturbance.   Respiratory:  Positive for shortness of breath. Negative for cough.    Cardiovascular:  Positive for chest pain. Negative for palpitations and leg swelling.   Gastrointestinal:  Negative for abdominal pain, nausea and vomiting.   Endocrine: Negative for polydipsia and polyuria.   Genitourinary:  Negative for dysuria.   Musculoskeletal:         + right upper arm pain   Skin:  Negative for rash.   Neurological:  Negative for dizziness, weakness, light-headedness, numbness and headaches.     Physical Exam     Initial Vitals [10/28/22 2234]   BP Pulse Resp Temp SpO2   126/77 72 18 97.8 °F (36.6 °C) 99 %      MAP       --         Physical Exam    Nursing note and vitals reviewed.  Constitutional: She is not diaphoretic. No distress.   HENT:   Head: Normocephalic and atraumatic.   Mouth/Throat: Oropharynx is clear and moist.   Eyes: Conjunctivae are normal. No scleral icterus.   Neck: No JVD present.   Cardiovascular:  Normal rate, regular rhythm and normal heart sounds.     Exam reveals no gallop and no friction rub.       No murmur heard.  Pulses:       Radial pulses are 2+ on the right side and 2+ on the left side.         Dorsalis pedis pulses are 2+ on the right side and 2+ on the left side.   Pulmonary/Chest: Effort normal and breath sounds normal. No stridor. No respiratory distress. She has no decreased breath sounds. She has no wheezes. She has no rhonchi. She has no rales. She exhibits no tenderness.   Abdominal: Abdomen is soft. She exhibits no distension. There is no abdominal tenderness.   Musculoskeletal:         General: No edema.     Neurological: She is alert and oriented to person, place, and time. GCS score is 15. GCS eye subscore is 4. GCS verbal subscore is 5. GCS motor subscore is 6.   Skin: Skin is warm and dry. No pallor.       ED Course   Procedures  Labs Reviewed   COMPREHENSIVE METABOLIC PANEL - Abnormal; Notable for the following components:       Result Value    Anion Gap 7 (*)     All other components within normal limits   TROPONIN I   B-TYPE NATRIURETIC PEPTIDE   CBC W/ AUTO DIFFERENTIAL     EKG Readings: (Independently Interpreted)   10/28/2022 22:28   Normal sinus rhythm.  Ventricular rate 68 beats per minute.  Normal axis.  Normal QRS and QT intervals.  No ST segment elevation or depression.     Imaging Results              X-Ray Chest AP Portable (In process)                 V-rad Final Report. XR Chest.  No acute processes.     Medications   ketorolac injection 15 mg (15 mg Intravenous Given 10/29/22 0034)     Medical Decision Making:   History:   Old Medical Records: I decided to obtain old medical records.  Old Records Summarized: other records.       <> Summary of Records: PMH reviewed as above.  Differential Diagnosis:   Musculoskeletal pain, spontaneous pneumothorax, cardiac arrhythmia, pneumonia, pleural effusions, pericardial effusion/tamponade, pulmonary embolism  Independently Interpreted Test(s):   I have ordered and independently interpreted EKG Reading(s) - see prior notes  Clinical Tests:   Lab Tests: Ordered and Reviewed  Radiological Study: Ordered and Reviewed  Medical Tests: Ordered  and Reviewed    MDM:  This was an urgent evaluation for the above.  Arrival vitals were normal.  Vitals remained stable throughout time of evaluation.  She had no chest wall tenderness.  EKG and chest x-ray were negative for concerning findings.  Screening troponin and BMP were without elevation.  There was no white count elevation, electrolyte anomalies, or renal insufficiency on lab review as well.  She complained of headache after her initial evaluation.  She received IV Toradol which provided tremendous relief.  The exact etiology of her complaints was unclear.  Differential diagnoses included the above as well as other complaints.  Based on her presentation, exam, workup, and risk profile, her symptoms were likely due to muscle spasm or some other benign process.  She was instructed to arrange close follow-up with her PCP.  Strict ED return instructions were given.  Additional MDM:   Heart Score:    History:          Slightly suspicious.  ECG:             Normal  Age:               45-65 years  Risk factors: >= 3 risk factors or history of atherosclerotic disease  Troponin:       Less than or equal to normal limit  Final Score: 3          ED Course as of 10/29/22 0225   Fri Oct 28, 2022   2228 EKG received/reviewed.  No STEMI. [LP]   2323 Review of down time lab results: CBC - normal.    CMP, troponin, BNP pending. [LP]   Sat Oct 29, 2022   0048 Review of down time lab results: BNP - no elevation. [LP]      ED Course User Index  [LP] Hi Byrd III, MD                 Clinical Impression:   Final diagnoses:  [R07.9] Right-sided chest pain (Primary)  [M79.621] Pain in right upper arm  [G44.209] Tension headache        ED Disposition Condition    Discharge Stable          ED Prescriptions    None       Follow-up Information       Follow up With Specialties Details Why Contact Info    David Thorpe MD General Practice  Schedule close follow-up with your primary provider for a recheck. [address]  [place] [address]  [phone]      ER   Return to this ER or visit any other ER should you have any concerns that you feel need immediate attention.              Hi Byrd III, MD  10/29/22 [id number]

## 2023-03-06 ENCOUNTER — TELEPHONE (OUTPATIENT)
Dept: ORTHOPEDICS | Facility: CLINIC | Age: 58
End: 2023-03-06
Payer: MEDICAID

## 2023-03-06 NOTE — TELEPHONE ENCOUNTER
Attempted to reach pt re: 03/14 appt with Dr Tyler, no answer. MD will be out. Unable to lvm- mailbox full.  Appointment has been rescheduled with PA on 05/16 at 9:00.    Appointment mailed to pt.

## 2023-03-21 DIAGNOSIS — M25.512 CHRONIC LEFT SHOULDER PAIN: Primary | ICD-10-CM

## 2023-03-21 DIAGNOSIS — G89.29 CHRONIC LEFT SHOULDER PAIN: Primary | ICD-10-CM

## 2023-04-28 NOTE — DISCHARGE INSTRUCTIONS
PA Initiation    Medication: Simponi- PA Pending  Insurance Company: [employer] - Phone [phone] Fax [phone]  Pharmacy Filling the Rx: [facility] - [place], [place] - [facility] [address]  Filling Pharmacy Phone:    Filling Pharmacy Fax:    Start Date: 4/28/2023             Take Percocet as prescribed for your abdominal and back pain. Take Phenergan as prescribed for nausea. Apply Lidoderm patches as needed for back pain.     Follow up with primary care, GI, and Orthopedics in 2 days. Return to ER if you develop worsening symptoms or as needed.

## 2024-07-10 ENCOUNTER — TELEPHONE (OUTPATIENT)
Dept: RESEARCH | Facility: HOSPITAL | Age: 59
End: 2024-07-10
Payer: MEDICAID

## 2024-07-10 NOTE — TELEPHONE ENCOUNTER
I attempted to phone Lorraine Benedict today regarding her left shoulder but was unsuccessful. I did leave a voicemail asking that she return my call.

## (undated) DEVICE — PAD ABD 8X10 STERILE

## (undated) DEVICE — SOL IRR NACL .9% 3000ML

## (undated) DEVICE — Device

## (undated) DEVICE — CANNULA ARTHSCP BNNT 10MMX50CM

## (undated) DEVICE — CANNULA HEX FLEX 5 X 85MM

## (undated) DEVICE — NDL 18GA X1 1/2 REG BEVEL

## (undated) DEVICE — KIT TRIMANO CHIN

## (undated) DEVICE — SUT ETHILON 4-0 BLK MONO

## (undated) DEVICE — APPLICATOR CHLORAPREP ORN 26ML

## (undated) DEVICE — DRAPE STERI U-SHAPED 47X51IN

## (undated) DEVICE — NDL SAFETY 22G X 1.5 ECLIPSE

## (undated) DEVICE — SEE MEDLINE ITEM 152529

## (undated) DEVICE — POSITIONER IV ARMBOARD FOAM

## (undated) DEVICE — DRESSING XEROFORM 1X8IN

## (undated) DEVICE — SPONGE DERMACEA 4X4IN 12PLY

## (undated) DEVICE — NDL SCORPIAN SUTURE PASSER

## (undated) DEVICE — PACK UPPER EXTREMITY BAPTIST

## (undated) DEVICE — KIT TRIMANO

## (undated) DEVICE — SEE MEDLINE ITEM 157150

## (undated) DEVICE — NDL SPINAL 18GX3.5 SPINOCAN

## (undated) DEVICE — ELECTRODE 90 DEGREE ANGLE

## (undated) DEVICE — BLADE SHAVER 4.5 6/BX

## (undated) DEVICE — TUBE SET INFLOW/OUTFLOW

## (undated) DEVICE — PAD SHOULDER CARE POLAR

## (undated) DEVICE — SOL 9P NACL IRR PIC IL

## (undated) DEVICE — GOWN B1 X-LG X-LONG

## (undated) DEVICE — CONTAINER SPECIMEN STRL 4OZ

## (undated) DEVICE — UNDERGLOVES BIOGEL PI SIZE 7.5

## (undated) DEVICE — DRAPE INCISE IOBAN 2 23X17IN

## (undated) DEVICE — SET DECANTER MEDICHOICE

## (undated) DEVICE — SEE MEDLINE ITEM 152622

## (undated) DEVICE — GLOVE BIOGEL SKINSENSE PI 7.0

## (undated) DEVICE — PAD ELECTRODE STER 1.5X3

## (undated) DEVICE — SLING MEDIUM

## (undated) DEVICE — SEE MEDLINE ITEM 157166

## (undated) DEVICE — GLOVE BIOGEL SKINSENSE PI 8.5

## (undated) DEVICE — DRAPE PLASTIC U 60X72

## (undated) DEVICE — REAMER PILOTED HEADED 8.5MM

## (undated) DEVICE — CANNULA HEX FLEX 6X85MM

## (undated) DEVICE — DRESSING LEUKOPLAST FLEX 1X3IN

## (undated) DEVICE — DRAPE SURG W/TWL 17 5/8X23

## (undated) DEVICE — NDL SUREFIRE SCORPION RC

## (undated) DEVICE — TAPE SURG MEDIPORE 6X72IN

## (undated) DEVICE — GAUZE SPONGE 4X4 12PLY

## (undated) DEVICE — CANNULA HEX FLEX 7 X 85

## (undated) DEVICE — DRESSING XEROFORM FOIL PK 1X8

## (undated) DEVICE — UNDERGLOVES BIOGEL PI SZ 7 LF

## (undated) DEVICE — SEE MEDLINE ITEM 152530

## (undated) DEVICE — UNDERGLOVES BIOGEL PI SIZE 8.5

## (undated) DEVICE — SLING SHOT 3 LARGE

## (undated) DEVICE — SEE MEDLINE ITEM 157160

## (undated) DEVICE — SYR 30CC LUER LOCK

## (undated) DEVICE — STAPLER SKIN PROXIMATE WIDE

## (undated) DEVICE — SUT PDS II 0 CT VIL MONO 36

## (undated) DEVICE — ELECTRODE REM PLYHSV RETURN 9